# Patient Record
Sex: FEMALE | Race: WHITE | NOT HISPANIC OR LATINO | URBAN - METROPOLITAN AREA
[De-identification: names, ages, dates, MRNs, and addresses within clinical notes are randomized per-mention and may not be internally consistent; named-entity substitution may affect disease eponyms.]

---

## 2017-04-12 PROBLEM — Z00.00 ENCOUNTER FOR PREVENTIVE HEALTH EXAMINATION: Status: ACTIVE | Noted: 2017-04-12

## 2017-04-16 ENCOUNTER — EMERGENCY (EMERGENCY)
Facility: HOSPITAL | Age: 72
LOS: 0 days | Discharge: HOME | End: 2017-04-16
Attending: EMERGENCY MEDICINE | Admitting: EMERGENCY MEDICINE
Payer: MEDICARE

## 2017-04-16 VITALS
HEART RATE: 74 BPM | TEMPERATURE: 98 F | DIASTOLIC BLOOD PRESSURE: 70 MMHG | RESPIRATION RATE: 18 BRPM | OXYGEN SATURATION: 100 % | SYSTOLIC BLOOD PRESSURE: 128 MMHG

## 2017-04-16 VITALS — HEIGHT: 67 IN | WEIGHT: 143.08 LBS
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
## 2017-04-16 DIAGNOSIS — S69.91XA UNSPECIFIED INJURY OF RIGHT WRIST, HAND AND FINGER(S), INITIAL ENCOUNTER: ICD-10-CM
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
## 2017-04-16 DIAGNOSIS — M25.531 PAIN IN RIGHT WRIST: ICD-10-CM

## 2017-04-16 PROCEDURE — 29125 APPL SHORT ARM SPLINT STATIC: CPT | Mod: RT

## 2017-04-16 PROCEDURE — 99283 EMERGENCY DEPT VISIT LOW MDM: CPT | Mod: 25

## 2017-04-16 PROCEDURE — 73110 X-RAY EXAM OF WRIST: CPT | Mod: 26,RT

## 2017-04-16 RX ORDER — HYDROMORPHONE HYDROCHLORIDE 2 MG/ML
1 INJECTION INTRAMUSCULAR; INTRAVENOUS; SUBCUTANEOUS
Qty: 16 | Refills: 0 | OUTPATIENT
Start: 2017-04-16 | End: 2017-04-20

## 2017-04-16 RX ORDER — HYDROMORPHONE HYDROCHLORIDE 2 MG/ML
2 INJECTION INTRAMUSCULAR; INTRAVENOUS; SUBCUTANEOUS ONCE
Qty: 0 | Refills: 0 | Status: DISCONTINUED | OUTPATIENT
Start: 2017-04-16 | End: 2017-04-16

## 2017-04-16 RX ORDER — DIPHENHYDRAMINE HCL 50 MG
50 CAPSULE ORAL ONCE
Qty: 0 | Refills: 0 | Status: COMPLETED | OUTPATIENT
Start: 2017-04-16 | End: 2017-04-16

## 2017-04-16 RX ORDER — KETOROLAC TROMETHAMINE 30 MG/ML
15 SYRINGE (ML) INJECTION ONCE
Qty: 0 | Refills: 0 | Status: DISCONTINUED | OUTPATIENT
Start: 2017-04-16 | End: 2017-04-16

## 2017-04-16 RX ADMIN — HYDROMORPHONE HYDROCHLORIDE 2 MILLIGRAM(S): 2 INJECTION INTRAMUSCULAR; INTRAVENOUS; SUBCUTANEOUS at 11:34

## 2017-04-16 RX ADMIN — Medication 50 MILLIGRAM(S): at 11:34

## 2017-04-16 RX ADMIN — Medication 60 MILLIGRAM(S): at 09:32

## 2017-04-16 RX ADMIN — Medication 15 MILLIGRAM(S): at 10:20

## 2017-04-16 RX ADMIN — Medication 15 MILLIGRAM(S): at 09:32

## 2017-04-16 RX ADMIN — HYDROMORPHONE HYDROCHLORIDE 2 MILLIGRAM(S): 2 INJECTION INTRAMUSCULAR; INTRAVENOUS; SUBCUTANEOUS at 11:42

## 2017-04-16 NOTE — ED ADULT NURSE NOTE - OBJECTIVE STATEMENT
Pt reports hx of RA with pain to right wrist worsening over past few days. Pt initially denied acute injury, but upon further evaluation reports fall onto right hand.

## 2017-04-16 NOTE — ED PROVIDER NOTE - CONSTITUTIONAL, MLM
normal... Well appearing, thin, alert, oriented to person, place, time/situation and in mild  distress.

## 2017-04-16 NOTE — ED ADULT NURSE REASSESSMENT NOTE - NS ED NURSE REASSESS COMMENT FT1
Pt medicated as ordered for continued pain. Pt placed in right wrist/forearm splint and demonstrates understanding of use. Pt reports some relief of pain with splint. Pt ok to d/c as per MD and as per pt request, pt left prior to reassessment for pain relief.  Pt ambulatory with spouse for d/c.

## 2017-04-16 NOTE — ED PROVIDER NOTE - MEDICAL DECISION MAKING DETAILS
Salome Saeed   4/10/2017   Anticoagulation Therapy Visit    Description:  74 year old female   Provider:  Weiler, Karen, MD   Department:   Family Practice           INR as of 4/10/2017     Today's INR 2.7      Anticoagulation Summary as of 4/10/2017     INR goal 2.0-3.0   Today's INR 2.7   Full instructions 4/21: Hold; 4/22: Hold; 4/23: Hold; 4/24: Hold; 4/25: Hold; Otherwise 2.5 mg on Mon, Wed, Fri; 5 mg all other days   Next INR check 4/26/2017    Indications   Long-term (current) use of anticoagulants [Z79.01] [Z79.01]  CVA (cerebral vascular accident) (Resolved) [I63.9]         April 2017 Details    Sun Mon Tue Wed Thu Fri Sat           1                 2               3               4               5               6               7               8                 9               10      2.5 mg   See details      11      5 mg         12      2.5 mg         13      5 mg         14      2.5 mg         15      5 mg           16      5 mg         17      2.5 mg         18      5 mg         19      2.5 mg         20      5 mg         21      Hold         22      Hold           23      Hold   See details      24      Hold   See details      25      Hold   See details      26            27               28               29                 30                      Date Details   04/10 This INR check      04/23 Hold dose   Start Lovenox - take twice - every 12 hours      04/24 Hold dose   Continue Lovenox - take twice - every 12 hours      04/25 Hold dose   Last dose of Lovenox prior to surgery - only take morning dose       Date of next INR:  4/26/2017         How to take your warfarin dose     To take:  2.5 mg Take 0.5 of a 5 mg tablet.    To take:  5 mg Take 1 of the 5 mg tablets.    Hold Do not take your warfarin dose. See the Details table to the right for additional instructions.                 xr, pain meds, prednisone dose pack

## 2017-04-16 NOTE — ED ADULT NURSE REASSESSMENT NOTE - NS ED NURSE REASSESS COMMENT FT1
Pt reports minimal relief of pain with medication. Pt fingers warm and mobile with +radial pulses x2. Pt denies numbness or weakness, but limitation of movement noted due to pain.  MD to bedside.

## 2017-04-16 NOTE — ED PROVIDER NOTE - OBJECTIVE STATEMENT
71 year old female with rheumatoid arthritis on methotrexate, gabapentin, and prednisone 20mg a day, complains of right wrist pain with swelling for 1 day , pt is right handed and a , also with neck pain. "My rheumatoid is flaring." Dr. robledo usually gives her a cortisone shot but last 2 times it didn't work. Pt has had Bilateral knee replacements from the RA with . Pt. denies other medical hx including HTN, CAD, CVA, DM. 71 year old female with rheumatoid arthritis on methotrexate, gabapentin, and prednisone 20mg a day, complains of right wrist pain with swelling for 1 day , pt is right handed and a , also with neck pain. "My rheumatoid is flaring." Dr. robledo usually gives her a cortisone shot but last 2 times it didn't work. Pt notes a mild fall approx 1 week ago but swelling started yesterday. Pt has had Bilateral knee replacements from the RA with . Pt. denies other medical hx including HTN, CAD, CVA, DM.

## 2017-04-16 NOTE — ED PROVIDER NOTE - PHYSICAL EXAMINATION
right wrist with edema and dec ROM secondary to pain, no erythema, no warmth, Cervical spine with no midline TTP, no erythema, no warmth, no deformity, nl ROM with mild pain right wrist with edema and dec ROM secondary to pain, no erythema, no warmth, no snuff box TTP, Cervical spine with no midline TTP, no erythema, no warmth, no deformity, nl ROM with mild pain

## 2017-04-26 ENCOUNTER — EMERGENCY (EMERGENCY)
Facility: HOSPITAL | Age: 72
LOS: 0 days | Discharge: HOME | End: 2017-04-26
Attending: EMERGENCY MEDICINE | Admitting: EMERGENCY MEDICINE
Payer: MEDICARE

## 2017-04-26 VITALS
SYSTOLIC BLOOD PRESSURE: 134 MMHG | HEART RATE: 81 BPM | RESPIRATION RATE: 17 BRPM | OXYGEN SATURATION: 100 % | DIASTOLIC BLOOD PRESSURE: 74 MMHG

## 2017-04-26 VITALS — HEIGHT: 68 IN | WEIGHT: 143.08 LBS

## 2017-04-26 DIAGNOSIS — M25.551 PAIN IN RIGHT HIP: ICD-10-CM

## 2017-04-26 DIAGNOSIS — M85.88 OTHER SPECIFIED DISORDERS OF BONE DENSITY AND STRUCTURE, OTHER SITE: ICD-10-CM

## 2017-04-26 LAB
ALBUMIN SERPL ELPH-MCNC: 3 G/DL — LOW (ref 3.3–5)
ALP SERPL-CCNC: 100 U/L — SIGNIFICANT CHANGE UP (ref 40–120)
ALT FLD-CCNC: 15 U/L — SIGNIFICANT CHANGE UP (ref 12–78)
ANION GAP SERPL CALC-SCNC: 11 MMOL/L — SIGNIFICANT CHANGE UP (ref 5–17)
AST SERPL-CCNC: 17 U/L — SIGNIFICANT CHANGE UP (ref 15–37)
BASOPHILS # BLD AUTO: 0.1 K/UL — SIGNIFICANT CHANGE UP (ref 0–0.2)
BASOPHILS NFR BLD AUTO: 0.6 % — SIGNIFICANT CHANGE UP (ref 0–2)
BILIRUB SERPL-MCNC: 0.7 MG/DL — SIGNIFICANT CHANGE UP (ref 0.2–1.2)
BUN SERPL-MCNC: 20 MG/DL — SIGNIFICANT CHANGE UP (ref 7–23)
CALCIUM SERPL-MCNC: 9.1 MG/DL — SIGNIFICANT CHANGE UP (ref 8.5–10.1)
CHLORIDE SERPL-SCNC: 105 MMOL/L — SIGNIFICANT CHANGE UP (ref 96–108)
CO2 SERPL-SCNC: 24 MMOL/L — SIGNIFICANT CHANGE UP (ref 22–31)
CREAT SERPL-MCNC: 0.92 MG/DL — SIGNIFICANT CHANGE UP (ref 0.5–1.3)
EOSINOPHIL # BLD AUTO: 0 K/UL — SIGNIFICANT CHANGE UP (ref 0–0.5)
EOSINOPHIL NFR BLD AUTO: 0 % — SIGNIFICANT CHANGE UP (ref 0–6)
ERYTHROCYTE [SEDIMENTATION RATE] IN BLOOD: 37 MM/HR — HIGH (ref 0–20)
GLUCOSE SERPL-MCNC: 145 MG/DL — HIGH (ref 70–99)
HCT VFR BLD CALC: 34.7 % — SIGNIFICANT CHANGE UP (ref 34.5–45)
HGB BLD-MCNC: 11.8 G/DL — SIGNIFICANT CHANGE UP (ref 11.5–15.5)
LYMPHOCYTES # BLD AUTO: 0.7 K/UL — LOW (ref 1–3.3)
LYMPHOCYTES # BLD AUTO: 5.5 % — LOW (ref 13–44)
MCHC RBC-ENTMCNC: 28.1 PG — SIGNIFICANT CHANGE UP (ref 27–34)
MCHC RBC-ENTMCNC: 33.9 GM/DL — SIGNIFICANT CHANGE UP (ref 32–36)
MCV RBC AUTO: 83 FL — SIGNIFICANT CHANGE UP (ref 80–100)
MONOCYTES # BLD AUTO: 0.5 K/UL — SIGNIFICANT CHANGE UP (ref 0–0.9)
MONOCYTES NFR BLD AUTO: 4.3 % — SIGNIFICANT CHANGE UP (ref 2–14)
NEUTROPHILS # BLD AUTO: 11.2 K/UL — HIGH (ref 1.8–7.4)
NEUTROPHILS NFR BLD AUTO: 89.7 % — HIGH (ref 43–77)
PLATELET # BLD AUTO: 276 K/UL — SIGNIFICANT CHANGE UP (ref 150–400)
POTASSIUM SERPL-MCNC: 3.9 MMOL/L — SIGNIFICANT CHANGE UP (ref 3.5–5.3)
POTASSIUM SERPL-SCNC: 3.9 MMOL/L — SIGNIFICANT CHANGE UP (ref 3.5–5.3)
PROT SERPL-MCNC: 7.1 GM/DL — SIGNIFICANT CHANGE UP (ref 6–8.3)
RBC # BLD: 4.18 M/UL — SIGNIFICANT CHANGE UP (ref 3.8–5.2)
RBC # FLD: 13.8 % — SIGNIFICANT CHANGE UP (ref 10.3–14.5)
SODIUM SERPL-SCNC: 140 MMOL/L — SIGNIFICANT CHANGE UP (ref 135–145)
WBC # BLD: 12.5 K/UL — HIGH (ref 3.8–10.5)
WBC # FLD AUTO: 12.5 K/UL — HIGH (ref 3.8–10.5)

## 2017-04-26 PROCEDURE — 99284 EMERGENCY DEPT VISIT MOD MDM: CPT

## 2017-04-26 PROCEDURE — 76377 3D RENDER W/INTRP POSTPROCES: CPT | Mod: 26

## 2017-04-26 PROCEDURE — 72192 CT PELVIS W/O DYE: CPT | Mod: 26

## 2017-04-26 PROCEDURE — 73502 X-RAY EXAM HIP UNI 2-3 VIEWS: CPT | Mod: 26

## 2017-04-26 RX ORDER — KETOROLAC TROMETHAMINE 30 MG/ML
15 SYRINGE (ML) INJECTION ONCE
Qty: 0 | Refills: 0 | Status: DISCONTINUED | OUTPATIENT
Start: 2017-04-26 | End: 2017-04-26

## 2017-04-26 RX ORDER — TRAMADOL HYDROCHLORIDE 50 MG/1
1 TABLET ORAL
Qty: 10 | Refills: 0 | OUTPATIENT
Start: 2017-04-26

## 2017-04-26 RX ORDER — MORPHINE SULFATE 50 MG/1
4 CAPSULE, EXTENDED RELEASE ORAL ONCE
Qty: 0 | Refills: 0 | Status: DISCONTINUED | OUTPATIENT
Start: 2017-04-26 | End: 2017-04-26

## 2017-04-26 RX ORDER — HYDROMORPHONE HYDROCHLORIDE 2 MG/ML
1 INJECTION INTRAMUSCULAR; INTRAVENOUS; SUBCUTANEOUS ONCE
Qty: 0 | Refills: 0 | Status: DISCONTINUED | OUTPATIENT
Start: 2017-04-26 | End: 2017-04-26

## 2017-04-26 RX ORDER — KETOROLAC TROMETHAMINE 30 MG/ML
30 SYRINGE (ML) INJECTION ONCE
Qty: 0 | Refills: 0 | Status: DISCONTINUED | OUTPATIENT
Start: 2017-04-26 | End: 2017-04-26

## 2017-04-26 RX ORDER — TRAMADOL HYDROCHLORIDE 50 MG/1
50 TABLET ORAL ONCE
Qty: 0 | Refills: 0 | Status: COMPLETED | OUTPATIENT
Start: 2017-04-26 | End: 2017-04-26

## 2017-04-26 RX ADMIN — Medication 30 MILLIGRAM(S): at 13:40

## 2017-04-26 RX ADMIN — Medication 15 MILLIGRAM(S): at 13:02

## 2017-04-26 RX ADMIN — HYDROMORPHONE HYDROCHLORIDE 1 MILLIGRAM(S): 2 INJECTION INTRAMUSCULAR; INTRAVENOUS; SUBCUTANEOUS at 14:22

## 2017-04-26 RX ADMIN — Medication 125 MILLIGRAM(S): at 13:01

## 2017-04-26 RX ADMIN — Medication 30 MILLIGRAM(S): at 14:07

## 2017-04-26 RX ADMIN — MORPHINE SULFATE 4 MILLIGRAM(S): 50 CAPSULE, EXTENDED RELEASE ORAL at 13:02

## 2017-04-26 RX ADMIN — HYDROMORPHONE HYDROCHLORIDE 1 MILLIGRAM(S): 2 INJECTION INTRAMUSCULAR; INTRAVENOUS; SUBCUTANEOUS at 14:49

## 2017-04-26 NOTE — ED STATDOCS - NS ED MD SCRIBE ATTENDING SCRIBE SECTIONS
HISTORY OF PRESENT ILLNESS/PAST MEDICAL/SURGICAL/SOCIAL HISTORY/PROGRESS NOTE/DISPOSITION/PHYSICAL EXAM/REVIEW OF SYSTEMS/RESULTS

## 2017-04-26 NOTE — ED STATDOCS - MEDICAL DECISION MAKING DETAILS
70 y/o female presents to the ED with right hip apin, hx of RA. Plan blood work, xray and pain control 70 y/o female presents to the ED with right hip apin, hx of RA. Plan blood work, xray, steroids, and pain control ; poss admission if pt unable to ambulate

## 2017-04-26 NOTE — ED STATDOCS - MUSCULOSKELETAL, MLM
range of motion is not limited and there is no muscle tenderness. No midline spinal TTP. Pain in right hip with passive ROM, internal and external rotation.

## 2017-04-26 NOTE — ED STATDOCS - OBJECTIVE STATEMENT
70 y/o female with PMhx of RA presents to the ED c/o right hip pain that started 1 day ago. She states that it feels like her usual RA flare up and states that she has not taken the medications for her RA today. [facility] flare up in her right wrist and has been on steroids since she was discharged. Currently pt has no other complaints and denies trauma, chest pain, SOB, fever, abd pain and n/v/d. PMD Dr. Sexton. 72 y/o female with PMhx of RA presents to the ED c/o right hip pain that started 1 day ago. She states that it feels like her usual RA flare up and states that she has not taken the medications for her RA today. [facility] flare up in her right wrist and has been on steroids since she was discharged. Dropped her methotrexate and has not been able to take it. Currently pt has no other complaints and denies trauma, chest pain, SOB, fever, abd pain and n/v/d. PMD Dr. Sexton.

## 2017-04-26 NOTE — ED STATDOCS - DETAILS:
I, Autumn Hodges, performed the initial face to face bedside interview with this patient regarding history of present illness, review of symptoms and relevant past medical, social and family history.  I completed an independent physical examination.  I was the initial provider who evaluated this patient. I have signed out the follow up of any pending tests (i.e. labs, radiological studies) to the ACP.  I have communicated the patient’s plan of care and disposition with the ACP.  The history, relevant review of systems, past medical and surgical history, medical decision making, and physical examination was documented by the scribe in my presence and I attest to the accuracy of the documentation.

## 2017-04-27 LAB — CRP SERPL-MCNC: 23.48 MG/DL — HIGH (ref 0–0.4)

## 2017-06-13 NOTE — ED ADULT NURSE NOTE - NS PRO PASSIVE SMOKE EXP
HD#3    Patient seen and examined at bedside. Pt still c/o pain overnight. Seen and counselled by Dr. Barahona of GYN Oncology yesterday. NPO for OR today. Pt was hypertensive overnight and says this was secondary to agitation from her hospital-room neighbor keeping her awake.     Patient is ambulating, passing flatus, voiding spontaneously. Denies CP, SOB, N/V, fevers, and chills    Vital Signs Last 24 Hours    T(C): 36.7, Max: 36.9 (06-12 @ 21:06)  HR: 80 (74 - 94)  BP: 160/88 (121/74 - 172/96)  RR: 18 (17 - 18)  SpO2: 95% (94% - 95%)  Wt(kg): --    I&O's Summary  I & Os for 24h ending 12 Jun 2017 07:00  =============================================  IN: 2605 ml / OUT: 0 ml / NET: 2605 ml    I & Os for current day (as of 13 Jun 2017 06:54)  =============================================  IN: 1040 ml / OUT: 0 ml / NET: 1040 ml      Physical Exam:  General: NAD  CV: NR, RR, S1, S2, no M/R/G  Lungs: CTA-B  Abdomen: Soft, RLQ tenderness, non-distended, +BS  Ext: No pain or swelling    Labs:                        15.7   9.3   )-----------( 176      ( 11 Jun 2017 18:41 )             45.9   baso 0.2    eos 0.5    imm gran x      lymph 24.8   mono 6.2    poly 68.3     06-11    140  |  101  |  11  ----------------------------<  122<H>  4.2   |  26  |  0.73    Ca    9.6      11 Jun 2017 18:41    TPro  7.5  /  Alb  3.8  /  TBili  0.3  /  DBili  x   /  AST  21  /  ALT  19  /  AlkPhos  64  06-11    PT/INR - ( 11 Jun 2017 18:41 )   PT: 11.9 sec;   INR: 1.10 ratio         PTT - ( 11 Jun 2017 18:41 )  PTT:29.0 sec      Blood Type: O Positive      MEDICATIONS  (STANDING):  nicotine - 21 mG/24Hr(s) Patch 1patch Transdermal daily  levothyroxine 100MICROGram(s) Oral daily  heparin  Injectable 5000Unit(s) SubCutaneous every 12 hours  lactated ringers. 1000milliLiter(s) IV Continuous <Continuous>    MEDICATIONS  (PRN):  ketorolac   Injectable 30milliGRAM(s) IV Push every 6 hours PRN Mild Pain (1 - 3)  morphine  - Injectable 4milliGRAM(s) IV Push every 4 hours PRN Severe Pain (7 - 10) No

## 2017-06-23 ENCOUNTER — APPOINTMENT (OUTPATIENT)
Dept: RHEUMATOLOGY | Facility: CLINIC | Age: 72
End: 2017-06-23

## 2017-07-27 ENCOUNTER — OUTPATIENT (OUTPATIENT)
Dept: OUTPATIENT SERVICES | Facility: HOSPITAL | Age: 72
LOS: 1 days | Discharge: HOME | End: 2017-07-27
Payer: MEDICARE

## 2017-07-27 VITALS
TEMPERATURE: 98 F | WEIGHT: 143.3 LBS | OXYGEN SATURATION: 96 % | HEART RATE: 96 BPM | DIASTOLIC BLOOD PRESSURE: 89 MMHG | HEIGHT: 68 IN | SYSTOLIC BLOOD PRESSURE: 140 MMHG | RESPIRATION RATE: 16 BRPM
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
## 2017-07-27 DIAGNOSIS — Z98.890 OTHER SPECIFIED POSTPROCEDURAL STATES: Chronic | ICD-10-CM

## 2017-07-27 DIAGNOSIS — Z96.651 PRESENCE OF RIGHT ARTIFICIAL KNEE JOINT: Chronic | ICD-10-CM

## 2017-07-27 DIAGNOSIS — Z79.899 OTHER LONG TERM (CURRENT) DRUG THERAPY: ICD-10-CM

## 2017-07-27 DIAGNOSIS — I10 ESSENTIAL (PRIMARY) HYPERTENSION: ICD-10-CM

## 2017-07-27 DIAGNOSIS — D72.829 ELEVATED WHITE BLOOD CELL COUNT, UNSPECIFIED: ICD-10-CM

## 2017-07-27 DIAGNOSIS — M06.9 RHEUMATOID ARTHRITIS, UNSPECIFIED: ICD-10-CM

## 2017-07-27 DIAGNOSIS — T84.033S MECHANICAL LOOSENING OF INTERNAL LEFT KNEE PROSTHETIC JOINT, SEQUELA: ICD-10-CM

## 2017-07-27 DIAGNOSIS — T84.033A MECHANICAL LOOSENING OF INTERNAL LEFT KNEE PROSTHETIC JOINT, INITIAL ENCOUNTER: ICD-10-CM

## 2017-07-27 DIAGNOSIS — Z01.818 ENCOUNTER FOR OTHER PREPROCEDURAL EXAMINATION: ICD-10-CM

## 2017-07-27 DIAGNOSIS — D62 ACUTE POSTHEMORRHAGIC ANEMIA: ICD-10-CM

## 2017-07-27 DIAGNOSIS — T84.093A OTHER MECHANICAL COMPLICATION OF INTERNAL LEFT KNEE PROSTHESIS, INITIAL ENCOUNTER: Chronic | ICD-10-CM

## 2017-07-27 LAB
ANION GAP SERPL CALC-SCNC: 5 MMOL/L — SIGNIFICANT CHANGE UP (ref 5–17)
APPEARANCE UR: CLEAR — SIGNIFICANT CHANGE UP
BACTERIA # UR AUTO: (no result)
BASOPHILS # BLD AUTO: 0.2 K/UL — SIGNIFICANT CHANGE UP (ref 0–0.2)
BASOPHILS NFR BLD AUTO: 1.1 % — SIGNIFICANT CHANGE UP (ref 0–2)
BILIRUB UR-MCNC: NEGATIVE — SIGNIFICANT CHANGE UP
BLD GP AB SCN SERPL QL: SIGNIFICANT CHANGE UP
BUN SERPL-MCNC: 22 MG/DL — SIGNIFICANT CHANGE UP (ref 7–23)
CALCIUM SERPL-MCNC: 9.3 MG/DL — SIGNIFICANT CHANGE UP (ref 8.5–10.1)
CHLORIDE SERPL-SCNC: 103 MMOL/L — SIGNIFICANT CHANGE UP (ref 96–108)
CO2 SERPL-SCNC: 30 MMOL/L — SIGNIFICANT CHANGE UP (ref 22–31)
COLOR SPEC: YELLOW — SIGNIFICANT CHANGE UP
CREAT SERPL-MCNC: 0.93 MG/DL — SIGNIFICANT CHANGE UP (ref 0.5–1.3)
DIFF PNL FLD: NEGATIVE — SIGNIFICANT CHANGE UP
EOSINOPHIL # BLD AUTO: 0.2 K/UL — SIGNIFICANT CHANGE UP (ref 0–0.5)
EOSINOPHIL NFR BLD AUTO: 1.4 % — SIGNIFICANT CHANGE UP (ref 0–6)
EPI CELLS # UR: SIGNIFICANT CHANGE UP
GLUCOSE SERPL-MCNC: 138 MG/DL — HIGH (ref 70–99)
GLUCOSE UR QL: NEGATIVE MG/DL — SIGNIFICANT CHANGE UP
HCT VFR BLD CALC: 39.7 % — SIGNIFICANT CHANGE UP (ref 34.5–45)
HGB BLD-MCNC: 13.2 G/DL — SIGNIFICANT CHANGE UP (ref 11.5–15.5)
INR BLD: 0.98 RATIO — SIGNIFICANT CHANGE UP (ref 0.88–1.16)
KETONES UR-MCNC: NEGATIVE — SIGNIFICANT CHANGE UP
LEUKOCYTE ESTERASE UR-ACNC: (no result)
LYMPHOCYTES # BLD AUTO: 1.1 K/UL — SIGNIFICANT CHANGE UP (ref 1–3.3)
LYMPHOCYTES # BLD AUTO: 6.8 % — LOW (ref 13–44)
MCHC RBC-ENTMCNC: 27.8 PG — SIGNIFICANT CHANGE UP (ref 27–34)
MCHC RBC-ENTMCNC: 33.3 GM/DL — SIGNIFICANT CHANGE UP (ref 32–36)
MCV RBC AUTO: 83.6 FL — SIGNIFICANT CHANGE UP (ref 80–100)
MONOCYTES # BLD AUTO: 1.1 K/UL — HIGH (ref 0–0.9)
MONOCYTES NFR BLD AUTO: 6.9 % — SIGNIFICANT CHANGE UP (ref 2–14)
MRSA PCR RESULT.: DETECTED
NEUTROPHILS # BLD AUTO: 13.8 K/UL — HIGH (ref 1.8–7.4)
NEUTROPHILS NFR BLD AUTO: 83.8 % — HIGH (ref 43–77)
NITRITE UR-MCNC: NEGATIVE — SIGNIFICANT CHANGE UP
PH UR: 5 — SIGNIFICANT CHANGE UP (ref 5–8)
PLATELET # BLD AUTO: 329 K/UL — SIGNIFICANT CHANGE UP (ref 150–400)
POTASSIUM SERPL-MCNC: 4.3 MMOL/L — SIGNIFICANT CHANGE UP (ref 3.5–5.3)
POTASSIUM SERPL-SCNC: 4.3 MMOL/L — SIGNIFICANT CHANGE UP (ref 3.5–5.3)
PROT UR-MCNC: NEGATIVE MG/DL — SIGNIFICANT CHANGE UP
PROTHROM AB SERPL-ACNC: 10.6 SEC — SIGNIFICANT CHANGE UP (ref 9.8–12.7)
RBC # BLD: 4.75 M/UL — SIGNIFICANT CHANGE UP (ref 3.8–5.2)
RBC # FLD: 13.6 % — SIGNIFICANT CHANGE UP (ref 10.3–14.5)
RBC CASTS # UR COMP ASSIST: NEGATIVE /HPF — SIGNIFICANT CHANGE UP (ref 0–4)
S AUREUS DNA NOSE QL NAA+PROBE: DETECTED
SODIUM SERPL-SCNC: 138 MMOL/L — SIGNIFICANT CHANGE UP (ref 135–145)
SP GR SPEC: 1.01 — SIGNIFICANT CHANGE UP (ref 1.01–1.02)
TYPE + AB SCN PNL BLD: SIGNIFICANT CHANGE UP
UROBILINOGEN FLD QL: NEGATIVE MG/DL — SIGNIFICANT CHANGE UP
WBC # BLD: 16.4 K/UL — HIGH (ref 3.8–10.5)
WBC # FLD AUTO: 16.4 K/UL — HIGH (ref 3.8–10.5)
WBC UR QL: SIGNIFICANT CHANGE UP

## 2017-07-27 PROCEDURE — 93010 ELECTROCARDIOGRAM REPORT: CPT

## 2017-07-27 NOTE — H&P PST ADULT - PMH
Dizziness    HTN (hypertension)    RA (rheumatoid arthritis)    Unsteady gait  due to unstable Left knee

## 2017-07-27 NOTE — H&P PST ADULT - ASSESSMENT
This is a 72y/o female with a failed total left knee replacement who is now scheduled for a Left Total knee replacement    Patient instructed on     1. NPO post midnight of surgery  2. On the use of EZ sponges  3. Mupirocin use (told not to use unless she receives a phone call to start)  4. Aware that she needs medical clearance  5. May take Amlodipine with a sip of water This is a 70y/o female with a failed total Left knee replacement who is now scheduled for a new Left Total knee replacement    Patient instructed on     1. NPO post midnight of surgery  2. On the use of EZ sponges  3. Mupirocin use (told not to use unless she receives a phone call to start medication)  4. Aware that she needs medical clearance (has appointment with Dr. Sexton on 7/28/2017)  5. May take Amlodipine with a sip of water  6. Caprini score is 7

## 2017-07-27 NOTE — H&P PST ADULT - HISTORY OF PRESENT ILLNESS
This is a 70 y/o female who has a PMH of HTN who reports she had a Left knee replacement 6/28/2016. She reports since then she has fallen twice due to Left knee giving out" Both times she has fallen on her knees. She is snow scheduled for a Left knee revision. This is a 70 y/o female who has a PMH of HTN who reports she had a Left knee replacement 6/28/2016. She reports since then she has fallen twice due to Left knee giving out" Both times she has fallen on her knees. She is now scheduled for a Left knee revision.

## 2017-07-27 NOTE — H&P PST ADULT - PSH
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

## 2017-07-27 NOTE — H&P PST ADULT - FAMILY HISTORY
Father  Still living? No  Family history of lung cancer, Age at diagnosis: Age Unknown     Mother  Still living? No  Family history of heart disease, Age at diagnosis: Age Unknown

## 2017-07-28 NOTE — CHART NOTE - NSCHARTNOTEFT_GEN_A_CORE
spoke with patient on the phone to notify her of +MRSA nasal swab. Pt instructed to start mupirocin therapy BID on  7/31/17 & continue for 5 days. Instructed Patient to bring in ointment & mupirocin card on day of surgery, she verbalized understanding.

## 2017-08-03 RX ORDER — ACETAMINOPHEN 500 MG
650 TABLET ORAL ONCE
Qty: 0 | Refills: 0 | Status: COMPLETED | OUTPATIENT
Start: 2017-08-04 | End: 2017-08-04

## 2017-08-03 RX ORDER — OXYCODONE HYDROCHLORIDE 5 MG/1
20 TABLET ORAL ONCE
Qty: 0 | Refills: 0 | Status: DISCONTINUED | OUTPATIENT
Start: 2017-08-04 | End: 2017-08-04

## 2017-08-03 RX ORDER — CELECOXIB 200 MG/1
200 CAPSULE ORAL ONCE
Qty: 0 | Refills: 0 | Status: COMPLETED | OUTPATIENT
Start: 2017-08-04 | End: 2017-08-04

## 2017-08-03 RX ORDER — SODIUM CHLORIDE 9 MG/ML
3 INJECTION INTRAMUSCULAR; INTRAVENOUS; SUBCUTANEOUS EVERY 8 HOURS
Qty: 0 | Refills: 0 | Status: DISCONTINUED | OUTPATIENT
Start: 2017-08-04 | End: 2017-08-08

## 2017-08-04 ENCOUNTER — INPATIENT (INPATIENT)
Facility: HOSPITAL | Age: 72
LOS: 3 days | Discharge: HOME | End: 2017-08-08
Attending: ORTHOPAEDIC SURGERY | Admitting: ORTHOPAEDIC SURGERY
Payer: MEDICARE

## 2017-08-04 ENCOUNTER — TRANSCRIPTION ENCOUNTER (OUTPATIENT)
Age: 72
End: 2017-08-04

## 2017-08-04 ENCOUNTER — RESULT REVIEW (OUTPATIENT)
Age: 72
End: 2017-08-04

## 2017-08-04 VITALS
HEART RATE: 78 BPM | HEIGHT: 68 IN | DIASTOLIC BLOOD PRESSURE: 95 MMHG | TEMPERATURE: 98 F | SYSTOLIC BLOOD PRESSURE: 144 MMHG | OXYGEN SATURATION: 99 % | RESPIRATION RATE: 16 BRPM | WEIGHT: 143.3 LBS

## 2017-08-04 DIAGNOSIS — Z98.890 OTHER SPECIFIED POSTPROCEDURAL STATES: Chronic | ICD-10-CM

## 2017-08-04 DIAGNOSIS — Z96.651 PRESENCE OF RIGHT ARTIFICIAL KNEE JOINT: Chronic | ICD-10-CM

## 2017-08-04 DIAGNOSIS — T84.093A OTHER MECHANICAL COMPLICATION OF INTERNAL LEFT KNEE PROSTHESIS, INITIAL ENCOUNTER: Chronic | ICD-10-CM

## 2017-08-04 LAB
ANION GAP SERPL CALC-SCNC: 8 MMOL/L — SIGNIFICANT CHANGE UP (ref 5–17)
APTT BLD: 26.1 SEC — LOW (ref 27.5–37.4)
APTT BLD: 27.1 SEC — LOW (ref 27.5–37.4)
BUN SERPL-MCNC: 21 MG/DL — SIGNIFICANT CHANGE UP (ref 7–23)
CALCIUM SERPL-MCNC: 9 MG/DL — SIGNIFICANT CHANGE UP (ref 8.5–10.1)
CHLORIDE SERPL-SCNC: 109 MMOL/L — HIGH (ref 96–108)
CO2 SERPL-SCNC: 26 MMOL/L — SIGNIFICANT CHANGE UP (ref 22–31)
CREAT SERPL-MCNC: 0.95 MG/DL — SIGNIFICANT CHANGE UP (ref 0.5–1.3)
GLUCOSE SERPL-MCNC: 160 MG/DL — HIGH (ref 70–99)
HCT VFR BLD CALC: 34.1 % — LOW (ref 34.5–45)
HGB BLD-MCNC: 11.3 G/DL — LOW (ref 11.5–15.5)
INR BLD: 1.01 RATIO — SIGNIFICANT CHANGE UP (ref 0.88–1.16)
INR BLD: 1.05 RATIO — SIGNIFICANT CHANGE UP (ref 0.88–1.16)
MCHC RBC-ENTMCNC: 27.5 PG — SIGNIFICANT CHANGE UP (ref 27–34)
MCHC RBC-ENTMCNC: 33 GM/DL — SIGNIFICANT CHANGE UP (ref 32–36)
MCV RBC AUTO: 83.2 FL — SIGNIFICANT CHANGE UP (ref 80–100)
PLATELET # BLD AUTO: 307 K/UL — SIGNIFICANT CHANGE UP (ref 150–400)
POTASSIUM SERPL-MCNC: 4 MMOL/L — SIGNIFICANT CHANGE UP (ref 3.5–5.3)
POTASSIUM SERPL-SCNC: 4 MMOL/L — SIGNIFICANT CHANGE UP (ref 3.5–5.3)
PROTHROM AB SERPL-ACNC: 10.9 SEC — SIGNIFICANT CHANGE UP (ref 9.8–12.7)
PROTHROM AB SERPL-ACNC: 11.3 SEC — SIGNIFICANT CHANGE UP (ref 9.8–12.7)
RBC # BLD: 4.1 M/UL — SIGNIFICANT CHANGE UP (ref 3.8–5.2)
RBC # FLD: 13.8 % — SIGNIFICANT CHANGE UP (ref 10.3–14.5)
SODIUM SERPL-SCNC: 143 MMOL/L — SIGNIFICANT CHANGE UP (ref 135–145)
WBC # BLD: 10.4 K/UL — SIGNIFICANT CHANGE UP (ref 3.8–10.5)
WBC # FLD AUTO: 10.4 K/UL — SIGNIFICANT CHANGE UP (ref 3.8–10.5)

## 2017-08-04 PROCEDURE — 88304 TISSUE EXAM BY PATHOLOGIST: CPT | Mod: 26

## 2017-08-04 PROCEDURE — 73560 X-RAY EXAM OF KNEE 1 OR 2: CPT | Mod: 26,LT

## 2017-08-04 PROCEDURE — 99223 1ST HOSP IP/OBS HIGH 75: CPT

## 2017-08-04 PROCEDURE — 88300 SURGICAL PATH GROSS: CPT | Mod: 26

## 2017-08-04 RX ORDER — POLYETHYLENE GLYCOL 3350 17 G/17G
17 POWDER, FOR SOLUTION ORAL DAILY
Qty: 0 | Refills: 0 | Status: DISCONTINUED | OUTPATIENT
Start: 2017-08-04 | End: 2017-08-08

## 2017-08-04 RX ORDER — SODIUM CHLORIDE 9 MG/ML
1000 INJECTION, SOLUTION INTRAVENOUS
Qty: 0 | Refills: 0 | Status: DISCONTINUED | OUTPATIENT
Start: 2017-08-04 | End: 2017-08-04

## 2017-08-04 RX ORDER — DIPHENHYDRAMINE HCL 50 MG
50 CAPSULE ORAL EVERY 4 HOURS
Qty: 0 | Refills: 0 | Status: DISCONTINUED | OUTPATIENT
Start: 2017-08-05 | End: 2017-08-08

## 2017-08-04 RX ORDER — ASCORBIC ACID 60 MG
500 TABLET,CHEWABLE ORAL
Qty: 0 | Refills: 0 | Status: DISCONTINUED | OUTPATIENT
Start: 2017-08-04 | End: 2017-08-08

## 2017-08-04 RX ORDER — ONDANSETRON 8 MG/1
4 TABLET, FILM COATED ORAL EVERY 6 HOURS
Qty: 0 | Refills: 0 | Status: DISCONTINUED | OUTPATIENT
Start: 2017-08-04 | End: 2017-08-08

## 2017-08-04 RX ORDER — AMLODIPINE BESYLATE 2.5 MG/1
5 TABLET ORAL DAILY
Qty: 0 | Refills: 0 | Status: DISCONTINUED | OUTPATIENT
Start: 2017-08-05 | End: 2017-08-06

## 2017-08-04 RX ORDER — MAGNESIUM HYDROXIDE 400 MG/1
30 TABLET, CHEWABLE ORAL DAILY
Qty: 0 | Refills: 0 | Status: DISCONTINUED | OUTPATIENT
Start: 2017-08-04 | End: 2017-08-08

## 2017-08-04 RX ORDER — BENZOCAINE AND MENTHOL 5; 1 G/100ML; G/100ML
1 LIQUID ORAL
Qty: 0 | Refills: 0 | Status: DISCONTINUED | OUTPATIENT
Start: 2017-08-05 | End: 2017-08-08

## 2017-08-04 RX ORDER — FOLIC ACID 0.8 MG
1 TABLET ORAL DAILY
Qty: 0 | Refills: 0 | Status: DISCONTINUED | OUTPATIENT
Start: 2017-08-04 | End: 2017-08-08

## 2017-08-04 RX ORDER — OXYCODONE HYDROCHLORIDE 5 MG/1
10 TABLET ORAL EVERY 4 HOURS
Qty: 0 | Refills: 0 | Status: DISCONTINUED | OUTPATIENT
Start: 2017-08-04 | End: 2017-08-07

## 2017-08-04 RX ORDER — ACETAMINOPHEN 500 MG
650 TABLET ORAL EVERY 6 HOURS
Qty: 0 | Refills: 0 | Status: DISCONTINUED | OUTPATIENT
Start: 2017-08-04 | End: 2017-08-08

## 2017-08-04 RX ORDER — FERROUS SULFATE 325(65) MG
325 TABLET ORAL
Qty: 0 | Refills: 0 | Status: DISCONTINUED | OUTPATIENT
Start: 2017-08-04 | End: 2017-08-08

## 2017-08-04 RX ORDER — SODIUM CHLORIDE 9 MG/ML
1000 INJECTION, SOLUTION INTRAVENOUS
Qty: 0 | Refills: 0 | Status: DISCONTINUED | OUTPATIENT
Start: 2017-08-04 | End: 2017-08-06

## 2017-08-04 RX ORDER — FAMOTIDINE 10 MG/ML
20 INJECTION INTRAVENOUS EVERY 12 HOURS
Qty: 0 | Refills: 0 | Status: DISCONTINUED | OUTPATIENT
Start: 2017-08-04 | End: 2017-08-08

## 2017-08-04 RX ORDER — ONDANSETRON 8 MG/1
4 TABLET, FILM COATED ORAL ONCE
Qty: 0 | Refills: 0 | Status: DISCONTINUED | OUTPATIENT
Start: 2017-08-04 | End: 2017-08-04

## 2017-08-04 RX ORDER — HYDROMORPHONE HYDROCHLORIDE 2 MG/ML
1 INJECTION INTRAMUSCULAR; INTRAVENOUS; SUBCUTANEOUS
Qty: 0 | Refills: 0 | Status: DISCONTINUED | OUTPATIENT
Start: 2017-08-04 | End: 2017-08-07

## 2017-08-04 RX ORDER — CEFAZOLIN SODIUM 1 G
2000 VIAL (EA) INJECTION EVERY 6 HOURS
Qty: 0 | Refills: 0 | Status: COMPLETED | OUTPATIENT
Start: 2017-08-04 | End: 2017-08-05

## 2017-08-04 RX ORDER — SENNA PLUS 8.6 MG/1
2 TABLET ORAL AT BEDTIME
Qty: 0 | Refills: 0 | Status: DISCONTINUED | OUTPATIENT
Start: 2017-08-04 | End: 2017-08-08

## 2017-08-04 RX ORDER — DOCUSATE SODIUM 100 MG
100 CAPSULE ORAL THREE TIMES A DAY
Qty: 0 | Refills: 0 | Status: DISCONTINUED | OUTPATIENT
Start: 2017-08-04 | End: 2017-08-08

## 2017-08-04 RX ORDER — ASPIRIN/CALCIUM CARB/MAGNESIUM 324 MG
325 TABLET ORAL
Qty: 0 | Refills: 0 | Status: DISCONTINUED | OUTPATIENT
Start: 2017-08-04 | End: 2017-08-04

## 2017-08-04 RX ORDER — ASPIRIN/CALCIUM CARB/MAGNESIUM 324 MG
325 TABLET ORAL
Qty: 0 | Refills: 0 | Status: DISCONTINUED | OUTPATIENT
Start: 2017-08-05 | End: 2017-08-08

## 2017-08-04 RX ORDER — ZALEPLON 10 MG
5 CAPSULE ORAL AT BEDTIME
Qty: 0 | Refills: 0 | Status: DISCONTINUED | OUTPATIENT
Start: 2017-08-04 | End: 2017-08-08

## 2017-08-04 RX ORDER — FENTANYL CITRATE 50 UG/ML
50 INJECTION INTRAVENOUS
Qty: 0 | Refills: 0 | Status: DISCONTINUED | OUTPATIENT
Start: 2017-08-04 | End: 2017-08-04

## 2017-08-04 RX ORDER — OXYCODONE HYDROCHLORIDE 5 MG/1
5 TABLET ORAL EVERY 4 HOURS
Qty: 0 | Refills: 0 | Status: DISCONTINUED | OUTPATIENT
Start: 2017-08-04 | End: 2017-08-07

## 2017-08-04 RX ORDER — OXYCODONE HYDROCHLORIDE 5 MG/1
5 TABLET ORAL EVERY 4 HOURS
Qty: 0 | Refills: 0 | Status: DISCONTINUED | OUTPATIENT
Start: 2017-08-04 | End: 2017-08-04

## 2017-08-04 RX ADMIN — OXYCODONE HYDROCHLORIDE 20 MILLIGRAM(S): 5 TABLET ORAL at 11:29

## 2017-08-04 RX ADMIN — Medication 1 TABLET(S): at 21:48

## 2017-08-04 RX ADMIN — OXYCODONE HYDROCHLORIDE 10 MILLIGRAM(S): 5 TABLET ORAL at 19:33

## 2017-08-04 RX ADMIN — Medication 325 MILLIGRAM(S): at 21:48

## 2017-08-04 RX ADMIN — Medication 100 MILLIGRAM(S): at 19:07

## 2017-08-04 RX ADMIN — CELECOXIB 200 MILLIGRAM(S): 200 CAPSULE ORAL at 11:29

## 2017-08-04 RX ADMIN — OXYCODONE HYDROCHLORIDE 5 MILLIGRAM(S): 5 TABLET ORAL at 16:47

## 2017-08-04 RX ADMIN — Medication 650 MILLIGRAM(S): at 11:29

## 2017-08-04 RX ADMIN — Medication 100 MILLIGRAM(S): at 21:48

## 2017-08-04 RX ADMIN — OXYCODONE HYDROCHLORIDE 10 MILLIGRAM(S): 5 TABLET ORAL at 19:03

## 2017-08-04 RX ADMIN — FAMOTIDINE 20 MILLIGRAM(S): 10 INJECTION INTRAVENOUS at 19:03

## 2017-08-04 NOTE — PHYSICAL THERAPY INITIAL EVALUATION ADULT - GENERAL OBSERVATIONS, REHAB EVAL
Pt rec'd supine in bed with  at bedside, reports she is tired and refusing to mobilize OOB with PT despite repeated instruction of benefits of mobility.

## 2017-08-04 NOTE — PROGRESS NOTE ADULT - SUBJECTIVE AND OBJECTIVE BOX
Orthopedics Post-op Check    This is a 70 y/o female s/p Left Total Knee Arthroplasty Revision POD #0.  Pain is controlled. Pt feeling well. No nausea or vomiting..    Vital Signs Last 24 Hrs  T(C): 36.5 (08-04-17 @ 16:30), Max: 37.6 (08-04-17 @ 15:15)  T(F): 97.7 (08-04-17 @ 16:30), Max: 99.6 (08-04-17 @ 15:15)  HR: 87 (08-04-17 @ 16:30) (78 - 95)  BP: 131/- (08-04-17 @ 16:30) (118/70 - 144/95)  BP(mean): --  RR: 16 (08-04-17 @ 16:30) (12 - 144)  SpO2: 95% (08-04-17 @ 16:30) (95% - 99%)                        11.3   10.4  )-----------( 307      ( 04 Aug 2017 15:47 )             34.1     04 Aug 2017 15:47    143    |  109    |  21     ----------------------------<  160    4.0     |  26     |  0.95     Ca    9.0        04 Aug 2017 15:47      PT/INR - ( 04 Aug 2017 15:47 )   PT: 11.3 sec;   INR: 1.05 ratio         PTT - ( 04 Aug 2017 15:47 )  PTT:26.1 sec    Exam:  NAD AAOx3  Dressing clean and dry.  Calves are soft and nontender.  +EHL FHL TA GS.  SILT toes 1-5.  DP and PT pulses 2+.    A/P:  Stable POD 0 Left Total Knee Arthroplasty Revision  -Analgesia  -Ppx ABX  -DVT PE ppx  -OOB PT

## 2017-08-04 NOTE — CONSULT NOTE ADULT - ASSESSMENT
This is a 71 year old female s/p left total knee revision with low risk thrombosis and low risk for bleeding.    Discussed the risks vs benefits of full dose aspirin therapy with patient. Agrees with treatment and understands the necessity of therapy.    Plan:  ::Enteric coated ASA 325mg PO BID x 30 days  ::Pepcid 20 mg PO Q12hr  ::Daily CBC/BMP  ::Venodynes  ::Enc ambulation    Thank you for this consult, will sign off. Do not hesitate to reconsult if necessary.
Pt is a 70 y/o female with h/o HTN, rheumatoid arthritis on chronic prednisone who s/p failed Lt knee replacement in 2016.  She has been having increasing Lt knee pain therefore she was admitted for Lt failed total knee arthroplasty.  Post-op medicine consult called for comanagement.      * Lt knee pain-s/p Lt total knee arthroplasty, continue pain control, PT, DVT proph, monitor post-op labs.  * Acute Blood Loss Anemia-surgical loss, monitor, po iron.  * HTN-continue amlodipine  * Rheumatoid Arthritis-resume po prednisone which she is on chronically, s/p IV steroids in OR, monitor for adrenal insufficiency  * Proph- aspirin  * Disp-OOB, PT

## 2017-08-04 NOTE — CONSULT NOTE ADULT - SUBJECTIVE AND OBJECTIVE BOX
CC: loose hardware left knee  HPI:  Patient is a 71y old  female s/p left total knee revision s/p loose hardware after recent TKR.      Consulted by Dr. Rodriguez for VTE prophylaxis, risk stratification, and anticoagulation management.    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 Denies any questions.    FAMILY HISTORY:  Family history of heart disease (Mother)  Family history of lung cancer (Father)    Denies any personal or familial history of clotting or bleeding disorders.    Allergies    No Known Allergies    Intolerances        REVIEW OF SYSTEMS    (  )Fever	     (  )Constipation	(  )SOB				(  )Headache	(  )Dysuria  (  )Chills	     (  )Melena	(  )Dyspnea present on exertion	                    (  )Dizziness                    (  )Polyuria  (  )Nausea	     (  )Hematochezia	(  )Cough			                    (  )Syncope   	(  )Hematuria  (  )Vomiting    (  )Chest Pain	(  )Wheezing			(  )Weakness  (  )Diarrhea     (  )Palpitations	(  )Anorexia			(  )Myalgia    All other review of systems negative: Yes      PHYSICAL EXAM:    Constitutional: Appears Well    Neurological: A& O x 3    Skin: Warm    Respiratory and Thorax: normal effort; Breath sounds: normal; No rales/wheezing/rhonchi  	  Cardiovascular: S1, S2, regular, NMBR	    Gastrointestinal: BS + x 4Q, nontender	    Genitourinary:  Bladder nondistended, nontender    Musculoskeletal:   General Right:   no muscle/joint tenderness,   normal tone, no joint swelling,   ROM: full	    General Left:   + muscle/joint tenderness,   normal tone, no joint swelling,   ROM: limited    Knee:  Left: Incision: ; Dressing CDI; Drain: hemovac ; Type of drng.: serosang.; Amt. of drng: small      Lower extrems:   Right: no calf tenderness              negative solis's sign               + pedal pulses    Left:   no calf tenderness              negative solis's sign               + pedal pulses                          11.3   10.4  )-----------( 307      ( 04 Aug 2017 15:47 )             34.1       08-04    143  |  109<H>  |  21  ----------------------------<  160<H>  4.0   |  26  |  0.95    Ca    9.0      04 Aug 2017 15:47        PT/INR - ( 04 Aug 2017 15:47 )   PT: 11.3 sec;   INR: 1.05 ratio         PTT - ( 04 Aug 2017 15:47 )  PTT:26.1 sec				    MEDICATIONS  (STANDING):  sodium chloride 0.9% lock flush 3 milliLiter(s) IV Push every 8 hours  lactated ringers. 1000 milliLiter(s) IV Continuous <Continuous>  ceFAZolin   IVPB 2000 milliGRAM(s) IV Intermittent every 6 hours  calcium carbonate 1250 mG + Vitamin D (OsCal 500 + D) 1 Tablet(s) Oral three times a day  famotidine    Tablet 20 milliGRAM(s) Oral every 12 hours  polyethylene glycol 3350 17 Gram(s) Oral daily  docusate sodium 100 milliGRAM(s) Oral three times a day  ferrous    sulfate 325 milliGRAM(s) Oral three times a day with meals  folic acid 1 milliGRAM(s) Oral daily  multivitamin 1 Tablet(s) Oral daily  ascorbic acid 500 milliGRAM(s) Oral two times a day  lactated ringers. 1000 milliLiter(s) IV Continuous <Continuous>  lactated ringers. 1000 milliLiter(s) IV Continuous <Continuous>  aspirin enteric coated 325 milliGRAM(s) Oral two times a day      Vital Signs Last 24 Hrs  T(C): 36.5 (04 Aug 2017 16:30), Max: 37.6 (04 Aug 2017 15:15)  T(F): 97.7 (04 Aug 2017 16:30), Max: 99.6 (04 Aug 2017 15:15)  HR: 87 (04 Aug 2017 16:30) (78 - 95)  BP: 131/- (04 Aug 2017 16:30) (118/70 - 144/95)  BP(mean): --  RR: 16 (04 Aug 2017 16:30) (12 - 144)  SpO2: 95% (04 Aug 2017 16:30) (95% - 99%)    DVT Prophylaxis:  LMWH                   (  )  Heparin SQ           (  )  Coumadin             (  )  Xarelto                  (  )  Eliquis                   (  )  Venodynes           ( x )  Ambulation          (x  )  UFH                       (  )  Contraindicated  (  )
Patient is a 71y old  Female who presents with a chief complaint of revision left total knee replacement (04 Aug 2017 10:53)      HPI: Pt is a 70 y/o female with h/o HTN, rheumatoid arthritis on chronic prednisone who s/p failed Lt knee replacement in 2016.  She has been having increasing Lt knee pain therefore she was admitted for Lt failed total knee arthroplasty.  Post-op medicine consult called for comanagement.  Pt seen in PACU c/o Lt knee pain.  No CP or SOB.        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 1000 milliLiter(s) (75 mL/Hr) IV Continuous <Continuous>  ceFAZolin   IVPB 2000 milliGRAM(s) IV Intermittent every 6 hours  calcium carbonate 1250 mG + Vitamin D (OsCal 500 + D) 1 Tablet(s) Oral three times a day  famotidine    Tablet 20 milliGRAM(s) Oral every 12 hours  polyethylene glycol 3350 17 Gram(s) Oral daily  docusate sodium 100 milliGRAM(s) Oral three times a day  ferrous    sulfate 325 milliGRAM(s) Oral three times a day with meals  folic acid 1 milliGRAM(s) Oral daily  multivitamin 1 Tablet(s) Oral daily  ascorbic acid 500 milliGRAM(s) Oral two times a day  lactated ringers. 1000 milliLiter(s) (150 mL/Hr) IV Continuous <Continuous>  lactated ringers. 1000 milliLiter(s) (100 mL/Hr) IV Continuous <Continuous>  aspirin enteric coated 325 milliGRAM(s) Oral two times a day    MEDICATIONS  (PRN):  acetaminophen   Tablet 650 milliGRAM(s) Oral every 6 hours PRN For Temp greater than 38 C (100.4 F) or headache  oxyCODONE    IR 5 milliGRAM(s) Oral every 4 hours PRN Mild Pain  oxyCODONE    IR 10 milliGRAM(s) Oral every 4 hours PRN Moderate Pain  HYDROmorphone  Injectable 1 milliGRAM(s) SubCutaneous every 3 hours PRN Severe Pain  aluminum hydroxide/magnesium hydroxide/simethicone Suspension 30 milliLiter(s) Oral four times a day PRN Indigestion  ondansetron Injectable 4 milliGRAM(s) IV Push every 6 hours PRN Nausea and/or Vomiting  zaleplon 5 milliGRAM(s) Oral at bedtime PRN Insomnia  magnesium hydroxide Suspension 30 milliLiter(s) Oral daily PRN Constipation  senna 2 Tablet(s) Oral at bedtime PRN Constipation  fentaNYL    Injectable 50 MICROGram(s) IV Push every 5 minutes PRN Severe Pain  oxyCODONE    IR 5 milliGRAM(s) Oral every 4 hours PRN For moderate pain  ondansetron Injectable 4 milliGRAM(s) IV Push once PRN Nausea and/or Vomiting      FAMILY HISTORY:  Family history of heart disease (Mother)  Family history of lung cancer (Father)      Social History: , lives with , drinks 1-2 drink few times a week.  Former smoker, quit 1974, smoked 2 ppd for 14 years.      Vital Signs Last 24 Hrs  T(C): 36.5 (04 Aug 2017 16:30), Max: 37.6 (04 Aug 2017 15:15)  T(F): 97.7 (04 Aug 2017 16:30), Max: 99.6 (04 Aug 2017 15:15)  HR: 87 (04 Aug 2017 16:30) (78 - 95)  BP: 131/- (04 Aug 2017 16:30) (118/70 - 144/95)  BP(mean): --  RR: 16 (04 Aug 2017 16:30) (12 - 144)  SpO2: 95% (04 Aug 2017 16:30) (95% - 99%)    Daily Height in cm: 172.72 (04 Aug 2017 10:45)    Daily     I&O's Summary    04 Aug 2017 07:01  -  04 Aug 2017 16:52  --------------------------------------------------------  IN: 1000 mL / OUT: 0 mL / NET: 1000 mL          Data                          11.3   10.4  )-----------( 307      ( 04 Aug 2017 15:47 )             34.1       08-04    143  |  109<H>  |  21  ----------------------------<  160<H>  4.0   |  26  |  0.95    Ca    9.0      04 Aug 2017 15:47                      PT/INR - ( 04 Aug 2017 15:47 )   PT: 11.3 sec;   INR: 1.05 ratio         PTT - ( 04 Aug 2017 15:47 )  PTT:26.1 sec

## 2017-08-04 NOTE — PHYSICAL THERAPY INITIAL EVALUATION ADULT - PERTINENT HX OF CURRENT PROBLEM, REHAB EVAL
70 y/o female with h/o HTN, rheumatoid arthritis on chronic prednisone who s/p failed Lt knee replacement in 2016.  She has been having increasing Lt knee pain therefore she was admitted for Lt failed total knee arthroplasty.

## 2017-08-05 LAB
ANION GAP SERPL CALC-SCNC: 9 MMOL/L — SIGNIFICANT CHANGE UP (ref 5–17)
BUN SERPL-MCNC: 18 MG/DL — SIGNIFICANT CHANGE UP (ref 7–23)
CALCIUM SERPL-MCNC: 8.9 MG/DL — SIGNIFICANT CHANGE UP (ref 8.5–10.1)
CHLORIDE SERPL-SCNC: 105 MMOL/L — SIGNIFICANT CHANGE UP (ref 96–108)
CO2 SERPL-SCNC: 27 MMOL/L — SIGNIFICANT CHANGE UP (ref 22–31)
CREAT SERPL-MCNC: 0.87 MG/DL — SIGNIFICANT CHANGE UP (ref 0.5–1.3)
GLUCOSE SERPL-MCNC: 97 MG/DL — SIGNIFICANT CHANGE UP (ref 70–99)
HCT VFR BLD CALC: 31.6 % — LOW (ref 34.5–45)
HGB BLD-MCNC: 10.6 G/DL — LOW (ref 11.5–15.5)
MCHC RBC-ENTMCNC: 27.5 PG — SIGNIFICANT CHANGE UP (ref 27–34)
MCHC RBC-ENTMCNC: 33.4 GM/DL — SIGNIFICANT CHANGE UP (ref 32–36)
MCV RBC AUTO: 82.3 FL — SIGNIFICANT CHANGE UP (ref 80–100)
PLATELET # BLD AUTO: 251 K/UL — SIGNIFICANT CHANGE UP (ref 150–400)
POTASSIUM SERPL-MCNC: 3.9 MMOL/L — SIGNIFICANT CHANGE UP (ref 3.5–5.3)
POTASSIUM SERPL-SCNC: 3.9 MMOL/L — SIGNIFICANT CHANGE UP (ref 3.5–5.3)
RBC # BLD: 3.84 M/UL — SIGNIFICANT CHANGE UP (ref 3.8–5.2)
RBC # FLD: 13.8 % — SIGNIFICANT CHANGE UP (ref 10.3–14.5)
SODIUM SERPL-SCNC: 141 MMOL/L — SIGNIFICANT CHANGE UP (ref 135–145)
WBC # BLD: 16.2 K/UL — HIGH (ref 3.8–10.5)
WBC # FLD AUTO: 16.2 K/UL — HIGH (ref 3.8–10.5)

## 2017-08-05 RX ORDER — OXYCODONE HYDROCHLORIDE 5 MG/1
5 TABLET ORAL ONCE
Qty: 0 | Refills: 0 | Status: DISCONTINUED | OUTPATIENT
Start: 2017-08-05 | End: 2017-08-05

## 2017-08-05 RX ADMIN — AMLODIPINE BESYLATE 5 MILLIGRAM(S): 2.5 TABLET ORAL at 06:19

## 2017-08-05 RX ADMIN — HYDROMORPHONE HYDROCHLORIDE 1 MILLIGRAM(S): 2 INJECTION INTRAMUSCULAR; INTRAVENOUS; SUBCUTANEOUS at 10:20

## 2017-08-05 RX ADMIN — Medication 100 MILLIGRAM(S): at 13:38

## 2017-08-05 RX ADMIN — OXYCODONE HYDROCHLORIDE 10 MILLIGRAM(S): 5 TABLET ORAL at 06:19

## 2017-08-05 RX ADMIN — Medication 500 MILLIGRAM(S): at 06:19

## 2017-08-05 RX ADMIN — Medication 1 TABLET(S): at 06:19

## 2017-08-05 RX ADMIN — SODIUM CHLORIDE 3 MILLILITER(S): 9 INJECTION INTRAMUSCULAR; INTRAVENOUS; SUBCUTANEOUS at 21:15

## 2017-08-05 RX ADMIN — Medication 1 TABLET(S): at 11:42

## 2017-08-05 RX ADMIN — Medication 325 MILLIGRAM(S): at 17:02

## 2017-08-05 RX ADMIN — Medication 10 MILLIGRAM(S): at 06:19

## 2017-08-05 RX ADMIN — Medication 325 MILLIGRAM(S): at 17:01

## 2017-08-05 RX ADMIN — HYDROMORPHONE HYDROCHLORIDE 1 MILLIGRAM(S): 2 INJECTION INTRAMUSCULAR; INTRAVENOUS; SUBCUTANEOUS at 13:48

## 2017-08-05 RX ADMIN — HYDROMORPHONE HYDROCHLORIDE 1 MILLIGRAM(S): 2 INJECTION INTRAMUSCULAR; INTRAVENOUS; SUBCUTANEOUS at 09:21

## 2017-08-05 RX ADMIN — POLYETHYLENE GLYCOL 3350 17 GRAM(S): 17 POWDER, FOR SOLUTION ORAL at 11:40

## 2017-08-05 RX ADMIN — FAMOTIDINE 20 MILLIGRAM(S): 10 INJECTION INTRAVENOUS at 06:19

## 2017-08-05 RX ADMIN — OXYCODONE HYDROCHLORIDE 10 MILLIGRAM(S): 5 TABLET ORAL at 12:50

## 2017-08-05 RX ADMIN — FAMOTIDINE 20 MILLIGRAM(S): 10 INJECTION INTRAVENOUS at 17:04

## 2017-08-05 RX ADMIN — Medication 325 MILLIGRAM(S): at 08:06

## 2017-08-05 RX ADMIN — Medication 1 TABLET(S): at 13:37

## 2017-08-05 RX ADMIN — Medication 500 MILLIGRAM(S): at 17:01

## 2017-08-05 RX ADMIN — Medication 100 MILLIGRAM(S): at 06:19

## 2017-08-05 RX ADMIN — Medication 100 MILLIGRAM(S): at 21:18

## 2017-08-05 RX ADMIN — Medication 1 MILLIGRAM(S): at 11:46

## 2017-08-05 RX ADMIN — SODIUM CHLORIDE 3 MILLILITER(S): 9 INJECTION INTRAMUSCULAR; INTRAVENOUS; SUBCUTANEOUS at 13:40

## 2017-08-05 RX ADMIN — SODIUM CHLORIDE 3 MILLILITER(S): 9 INJECTION INTRAMUSCULAR; INTRAVENOUS; SUBCUTANEOUS at 06:09

## 2017-08-05 RX ADMIN — OXYCODONE HYDROCHLORIDE 10 MILLIGRAM(S): 5 TABLET ORAL at 11:53

## 2017-08-05 RX ADMIN — Medication 325 MILLIGRAM(S): at 11:42

## 2017-08-05 RX ADMIN — Medication 325 MILLIGRAM(S): at 06:19

## 2017-08-05 RX ADMIN — OXYCODONE HYDROCHLORIDE 10 MILLIGRAM(S): 5 TABLET ORAL at 22:00

## 2017-08-05 RX ADMIN — HYDROMORPHONE HYDROCHLORIDE 1 MILLIGRAM(S): 2 INJECTION INTRAMUSCULAR; INTRAVENOUS; SUBCUTANEOUS at 14:45

## 2017-08-05 RX ADMIN — OXYCODONE HYDROCHLORIDE 10 MILLIGRAM(S): 5 TABLET ORAL at 21:17

## 2017-08-05 RX ADMIN — Medication 100 MILLIGRAM(S): at 02:29

## 2017-08-05 NOTE — PROGRESS NOTE ADULT - SUBJECTIVE AND OBJECTIVE BOX
Pt c/o Lt knee pain, uneventful night.  No CP or SOB.    Vital Signs Last 24 Hrs  T(C): 36.8 (05 Aug 2017 07:21), Max: 37.6 (04 Aug 2017 15:15)  T(F): 98.2 (05 Aug 2017 07:21), Max: 99.6 (04 Aug 2017 15:15)  HR: 75 (05 Aug 2017 06:08) (69 - 95)  BP: 155/84 (05 Aug 2017 06:08) (118/70 - 161/78)  BP(mean): --  RR: 16 (05 Aug 2017 03:28) (12 - 144)  SpO2: 100% (05 Aug 2017 03:28) (95% - 100%)    Daily Height in cm: 172.72 (04 Aug 2017 17:10)    Daily     I&O's Detail    04 Aug 2017 07:01  -  05 Aug 2017 07:00  --------------------------------------------------------  IN:    lactated ringers.: 850 mL    lactated ringers.: 200 mL    Other: 1000 mL  Total IN: 2050 mL    OUT:    Drain: 50 mL    Intermittent Catheterization - Urethral: 475 mL    Voided: 700 mL  Total OUT: 1225 mL    Total NET: 825 mL          CAPILLARY BLOOD GLUCOSE                                          10.6   16.2  )-----------( 251      ( 05 Aug 2017 05:35 )             31.6       08-05    141  |  105  |  18  ----------------------------<  97  3.9   |  27  |  0.87    Ca    8.9      05 Aug 2017 05:35        PT/INR - ( 04 Aug 2017 15:47 )   PT: 11.3 sec;   INR: 1.05 ratio         PTT - ( 04 Aug 2017 15:47 )  PTT:26.1 sec                MEDICATIONS  (STANDING):  sodium chloride 0.9% lock flush 3 milliLiter(s) IV Push every 8 hours  amLODIPine   Tablet 5 milliGRAM(s) Oral daily  lactated ringers. 1000 milliLiter(s) (75 mL/Hr) IV Continuous <Continuous>  calcium carbonate 1250 mG + Vitamin D (OsCal 500 + D) 1 Tablet(s) Oral three times a day  famotidine    Tablet 20 milliGRAM(s) Oral every 12 hours  polyethylene glycol 3350 17 Gram(s) Oral daily  docusate sodium 100 milliGRAM(s) Oral three times a day  ferrous    sulfate 325 milliGRAM(s) Oral three times a day with meals  folic acid 1 milliGRAM(s) Oral daily  multivitamin 1 Tablet(s) Oral daily  ascorbic acid 500 milliGRAM(s) Oral two times a day  predniSONE   Tablet 10 milliGRAM(s) Oral daily  aspirin enteric coated 325 milliGRAM(s) Oral two times a day    MEDICATIONS  (PRN):  acetaminophen   Tablet 650 milliGRAM(s) Oral every 6 hours PRN For Temp greater than 38 C (100.4 F) or headache  oxyCODONE    IR 5 milliGRAM(s) Oral every 4 hours PRN Mild Pain  oxyCODONE    IR 10 milliGRAM(s) Oral every 4 hours PRN Moderate Pain  HYDROmorphone  Injectable 1 milliGRAM(s) SubCutaneous every 3 hours PRN Severe Pain  aluminum hydroxide/magnesium hydroxide/simethicone Suspension 30 milliLiter(s) Oral four times a day PRN Indigestion  ondansetron Injectable 4 milliGRAM(s) IV Push every 6 hours PRN Nausea and/or Vomiting  zaleplon 5 milliGRAM(s) Oral at bedtime PRN Insomnia  magnesium hydroxide Suspension 30 milliLiter(s) Oral daily PRN Constipation  senna 2 Tablet(s) Oral at bedtime PRN Constipation  diphenhydrAMINE   Capsule 50 milliGRAM(s) Oral every 4 hours PRN Rash and/or Itching  benzocaine 15 mG/menthol 3.6 mG Lozenge 1 Lozenge Oral every 2 hours PRN Sore Throat

## 2017-08-05 NOTE — DISCHARGE NOTE ADULT - CARE PROVIDER_API CALL
Melchor Rodriguez (MD), Orthopaedic Surgery  [address] [place] [address]  Phone: [phone]  Fax: [phone]

## 2017-08-05 NOTE — DISCHARGE NOTE ADULT - MEDICATION SUMMARY - MEDICATIONS TO TAKE
I will START or STAY ON the medications listed below when I get home from the hospital:    predniSONE 10 mg oral tablet  -- 1 tab(s) by mouth once a day  -- Indication: For home med    Ecotrin 325 mg oral delayed release tablet  -- 1 tab(s) by mouth 2 times a day for blood clot prevention  -- Swallow whole.  Do not crush.  Take with food or milk.    -- Indication: For dvt ppx    oxyCODONE-acetaminophen 5 mg-325 mg oral tablet  -- 1 tab(s) by mouth every 4 hours, As Needed -for severe pain MDD:6 tabs per day  -- Caution federal law prohibits the transfer of this drug to any person other  than the person for whom it was prescribed.  May cause drowsiness.  Alcohol may intensify this effect.  Use care when operating dangerous machinery.  This prescription cannot be refilled.  This product contains acetaminophen.  Do not use  with any other product containing acetaminophen to prevent possible liver damage.  Using more of this medication than prescribed may cause serious breathing problems.    -- Indication: For pain    amLODIPine  -- 5 milligram(s) by mouth once a day  -- Indication: For home med    famotidine 20 mg oral tablet  -- 1 tab(s) by mouth 2 times a day for gastrointestinal prophylaxis  -- It is very important that you take or use this exactly as directed.  Do not skip doses or discontinue unless directed by your doctor.  Obtain medical advice before taking any non-prescription drugs as some may affect the action of this medication.    -- Indication: For gi ppx    ginger oral capsule  -- 1  by mouth once a day  -- Indication: For home med    Flax Seed Oil oral capsule  -- 1000 milligram(s) by mouth once a day  -- Indication: For home med    Fish Oil 1200 mg oral capsule  -- 1 cap(s) by mouth once a day  -- Indication: For home med    Vitamin C 1000 mg oral tablet  -- 1 tab(s) by mouth once a day  -- Indication: For home med    Vitamin D3 2000 intl units oral capsule  -- 1 cap(s) by mouth once a day  -- Indication: For home med    vitamin E 400 intl units oral capsule  -- 1 cap(s) by mouth once a day  -- Indication: For home med I will START or STAY ON the medications listed below when I get home from the hospital:    predniSONE 10 mg oral tablet  -- 1 tab(s) by mouth once a day  -- Indication: For home med    Ecotrin 325 mg oral delayed release tablet  -- 1 tab(s) by mouth 2 times a day for blood clot prevention  -- Swallow whole.  Do not crush.  Take with food or milk.    -- Indication: For dvt ppx    oxyCODONE-acetaminophen 5 mg-325 mg oral tablet  -- 1 tab(s) by mouth every 4 hours, As Needed -for severe pain MDD:6 tabs per day  -- Caution federal law prohibits the transfer of this drug to any person other  than the person for whom it was prescribed.  May cause drowsiness.  Alcohol may intensify this effect.  Use care when operating dangerous machinery.  This prescription cannot be refilled.  This product contains acetaminophen.  Do not use  with any other product containing acetaminophen to prevent possible liver damage.  Using more of this medication than prescribed may cause serious breathing problems.    -- Indication: For pain    amLODIPine  -- 5 milligram(s) by mouth once a day  -- Indication: For home med    famotidine 20 mg oral tablet  -- 1 tab(s) by mouth 2 times a day for gastrointestinal prophylaxis  -- It is very important that you take or use this exactly as directed.  Do not skip doses or discontinue unless directed by your doctor.  Obtain medical advice before taking any non-prescription drugs as some may affect the action of this medication.    -- Indication: For gi ppx    ginger oral capsule  -- 1  by mouth once a day  -- Indication: For home med    Flax Seed Oil oral capsule  -- 1000 milligram(s) by mouth once a day  -- Indication: For home med    Colace 100 mg oral capsule  -- 1 cap(s) by mouth 2 times a day -for constipation  -- Medication should be taken with plenty of water.    -- Indication: For stool softener    Fish Oil 1200 mg oral capsule  -- 1 cap(s) by mouth once a day  -- Indication: For home med    Vitamin C 1000 mg oral tablet  -- 1 tab(s) by mouth once a day  -- Indication: For home med    Vitamin D3 2000 intl units oral capsule  -- 1 cap(s) by mouth once a day  -- Indication: For home med    vitamin E 400 intl units oral capsule  -- 1 cap(s) by mouth once a day  -- Indication: For home med

## 2017-08-05 NOTE — DISCHARGE NOTE ADULT - HOSPITAL COURSE
The patient is a 71F status post elective left total knee revusuib arthroplasty after failing outpatient nonoperative conservative management.  Patient presented to [facility] after being medically cleared for an elective surgical procedure. The patient was taken to the operating room on date mentioned above. Prophylactic antibiotics were started before the procedure and continued for 24 hours.  There were no complications during the procedure and patient tolerated the procedure well.  The patient was transferred to recovery room in stable condition and subsequently to surgical floor.  Patient was placed on aspirin  for anticoagulation.  All home medications were continued.  The patient received physical therapy daily and daily labs were followed.  The dressing was kept clean, dry, and intact.  The rest of the hospital stay was unremarkable. The patient is a 71F status post elective left total knee revision arthroplasty after failing outpatient nonoperative conservative management.  Patient presented to [facility] after being medically cleared for an elective surgical procedure. The patient was taken to the operating room on date mentioned above. Prophylactic antibiotics were started before the procedure and continued for 24 hours.  There were no complications during the procedure and patient tolerated the procedure well.  The patient was transferred to recovery room in stable condition and subsequently to surgical floor.  Patient was placed on aspirin  for anticoagulation.  All home medications were continued.  The patient received physical therapy daily and daily labs were followed.  The dressing was kept clean, dry, and intact.  The rest of the hospital stay was unremarkable. 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 Pt is afebrile with stable vital signs. Pain is controlled. Alert and Oriented. Exam reveals intact EHL FHL TA GS, +DP. Dressing is clean and dry with a New Aquacel bandage on.    Vital Signs Last 24 Hrs  T(C): 36.9 (08-07-17 @ 05:08), Max: 36.9 (08-07-17 @ 05:08)  T(F): 98.4 (08-07-17 @ 05:08), Max: 98.4 (08-07-17 @ 05:08)  HR: 96 (08-07-17 @ 05:08) (90 - 96)  BP: 111/77 (08-07-17 @ 05:08) (111/77 - 116/74)  BP(mean): --  RR: 18 (08-07-17 @ 05:08) (18 - 18)  SpO2: 94% (08-07-17 @ 05:08) (94% - 100%)                        10.9   12.1  )-----------( 250      ( 07 Aug 2017 05:33 )             31.9         Hospital Course:  Patient presented to [facility] after being medically cleared for an elective surgical procedure, having failed outpatient non-operative conservative management. Prophylactic antibiotics were started before the procedure and continued for 24 hours. They were admitted after surgery to the orthopedic floor.   There were no complications during the hospital stay. All home medications were continued.     Routine consults were obtained from the Anticoagulation Team for DVT/PE prophylaxis, from Physical Therapy for twice daily PT starting on POD 0, and from the Hospitalist for Medical Co-management. Patient was placed on ECASA 325 BID for anticoagulation.  Pertinent home medications were continued.  Daily labs were followed.      On POD 0 the pt was OOB with PT and there were no overnight events. POD1 the hemovac drain  was removed. POD 2, PT was continued, and on POD 2 or POD 3 a new Aquacel dressing was applied. The pt is ready today for DC to home with home PT.  The orthopedic Attending is aware and agrees. 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 Pt is afebrile with stable vital signs. Pain is controlled. Alert and Oriented. Exam reveals intact EHL FHL TA GS, +DP. Dressing is clean and dry with a New Aquacel bandage on.    Vital Signs Last 24 Hrs  T(C): 36.6 (08 Aug 2017 04:21), Max: 36.7 (07 Aug 2017 23:29)  T(F): 97.9 (08 Aug 2017 04:21), Max: 98 (07 Aug 2017 23:29)  HR: 86 (08 Aug 2017 04:21) (86 - 95)  BP: 112/71 (08 Aug 2017 04:21) (112/71 - 128/79)  BP(mean): --  RR: 16 (08 Aug 2017 04:21) (16 - 17)  SpO2: 97% (08 Aug 2017 04:21) (94% - 97%)                        10.8   9.4   )-----------( 260      ( 08 Aug 2017 05:48 )             31.6       Hospital Course:  Patient presented to [facility] after being medically cleared for an elective surgical procedure, having failed outpatient non-operative conservative management. Prophylactic antibiotics were started before the procedure and continued for 24 hours. They were admitted after surgery to the orthopedic floor.   There were no complications during the hospital stay. All home medications were continued.     Routine consults were obtained from the Anticoagulation Team for DVT/PE prophylaxis, from Physical Therapy for twice daily PT starting on POD 0, and from the Hospitalist for Medical Co-management. Patient was placed on ECASA 325 BID for anticoagulation.  Pertinent home medications were continued.  Daily labs were followed.      On POD 0 the pt was OOB with PT and there were no overnight events. POD1 the hemovac drain  was removed. POD 2, PT was continued, and on POD 2 or POD 3 a new Aquacel dressing was applied. She stayed an extra day for PT and mild sedation/tiredness from pain meds, which resolved by reducing dosage. The pt is ready today POD4 for DC to home with home PT.  The orthopedic Attending is aware and agrees.

## 2017-08-05 NOTE — PROGRESS NOTE ADULT - ASSESSMENT
Pt is a 70 y/o female with h/o HTN, rheumatoid arthritis on chronic prednisone who s/p failed Lt knee replacement in 2016.  She has been having increasing Lt knee pain therefore she was admitted for Lt failed total knee arthroplasty.  Post-op medicine consult called for comanagement.      * Lt knee pain-s/p Lt total knee arthroplasty, continue pain control, PT, DVT proph, monitor post-op labs.  * Acute Blood Loss Anemia-surgical loss, monitor, po iron.  * HTN-bp labile, monitor if remains elevated than consider increasing amlodipine  * Rheumatoid Arthritis-continue po prednisone which she is on chronically, s/p IV steroids in OR, monitor for adrenal insufficiency  * Proph- aspirin  * Disp-OOB, PT   * Comm- d/w pt, RN Pt is a 70 y/o female with h/o HTN, rheumatoid arthritis on chronic prednisone who s/p failed Lt knee replacement in 2016.  She has been having increasing Lt knee pain therefore she was admitted for Lt failed total knee arthroplasty.  Post-op medicine consult called for comanagement.      * Lt knee pain-s/p Lt total knee arthroplasty, continue pain control, PT, DVT proph, monitor post-op labs.  * Acute Blood Loss Anemia-surgical loss, monitor, po iron.  * HTN-bp labile, monitor if remains elevated than consider increasing amlodipine  * Rheumatoid Arthritis-continue po prednisone which she is on chronically, s/p IV steroids in OR, monitor for adrenal insufficiency  * Leukocytosis-reactive from surgery, anemia and steroids.  Monitor for now since she is non-toxic and asymptomatic.  * Proph- aspirin  * Disp-OOB, PT   * Comm- d/w pt, RN

## 2017-08-05 NOTE — DISCHARGE NOTE ADULT - CARE PLAN
Principal Discharge DX:	Failed total left knee replacement  Goal:	Return to baseline ADLs  Instructions for follow-up, activity and diet:	Discharge Instructions for Total Knee Arthroplasty    1.  Diet: Resume previous diet    2. Activity: WBAT, Rolling walker, Daily PT. Gentle ROM 0-full as tolerated.  Wear immobilizer only while asleep x 3 weeks. Walk plenty.    3. Call with: fever over 101, wound redness, drainage or open area, calf pain/calf swelling    4. Wound Care: Remove old and place new Aquacel  bandage to Knee every 7 days. Remove Sutures/Staples Post Op Day #14 so long as wound is healed, no drainage or open area. OK to Shower with Aquacel.  Avoid direct water beating on bandage.  Continue ICE packs to knee.    5. DVT PE Prophylaxis:  -Continue Pepcid while on Anticoagulant    6. Labs: Check H&H weekly while on Anticoagulation    7. Follow Up: Orthopedics, 10-14 days in office. Call to schedule. If going home, eRX sent to your pharmacy for . Principal Discharge DX:	Failed total left knee replacement  Goal:	Return to baseline ADLs  Instructions for follow-up, activity and diet:	Discharge Instructions for Total Knee Arthroplasty    1.  Diet: Resume previous diet    2. Activity: WBAT, Rolling walker, Daily PT. Gentle ROM 0-full as tolerated.  Wear immobilizer only while asleep x 3 weeks. Walk plenty.    3. Call with: fever over 101, wound redness, drainage or open area, calf pain/calf swelling    4. Wound Care: Remove old and place new Aquacel  bandage to Knee every 7 days. Remove Sutures/Staples Post Op Day #14 (8/18) so long as wound is healed, no drainage or open area. OK to Shower with Aquacel.  Avoid direct water beating on bandage.  Continue ICE packs to knee.    5. DVT PE Prophylaxis: EC Aspirin twice daily 325mg  -Continue Pepcid while on Anticoagulant    6. Labs: Check H&H weekly while on Anticoagulation    7. Follow Up: Orthopedics, 10-14 days in office. Call to schedule. If going home, eRX sent to your pharmacy for .

## 2017-08-05 NOTE — DISCHARGE NOTE ADULT - MEDICATION SUMMARY - MEDICATIONS TO STOP TAKING
I will STOP taking the medications listed below when I get home from the hospital:    Aleve 220 mg oral capsule  -- 2 cap(s) by mouth once a day

## 2017-08-05 NOTE — DISCHARGE NOTE ADULT - PLAN OF CARE
Return to baseline ADLs Discharge Instructions for Total Knee Arthroplasty    1.  Diet: Resume previous diet    2. Activity: WBAT, Rolling walker, Daily PT. Gentle ROM 0-full as tolerated.  Wear immobilizer only while asleep x 3 weeks. Walk plenty.    3. Call with: fever over 101, wound redness, drainage or open area, calf pain/calf swelling    4. Wound Care: Remove old and place new Aquacel  bandage to Knee every 7 days. Remove Sutures/Staples Post Op Day #14 so long as wound is healed, no drainage or open area. OK to Shower with Aquacel.  Avoid direct water beating on bandage.  Continue ICE packs to knee.    5. DVT PE Prophylaxis:  -Continue Pepcid while on Anticoagulant    6. Labs: Check H&H weekly while on Anticoagulation    7. Follow Up: Orthopedics, 10-14 days in office. Call to schedule. If going home, eRX sent to your pharmacy for . Discharge Instructions for Total Knee Arthroplasty    1.  Diet: Resume previous diet    2. Activity: WBAT, Rolling walker, Daily PT. Gentle ROM 0-full as tolerated.  Wear immobilizer only while asleep x 3 weeks. Walk plenty.    3. Call with: fever over 101, wound redness, drainage or open area, calf pain/calf swelling    4. Wound Care: Remove old and place new Aquacel  bandage to Knee every 7 days. Remove Sutures/Staples Post Op Day #14 (8/18) so long as wound is healed, no drainage or open area. OK to Shower with Aquacel.  Avoid direct water beating on bandage.  Continue ICE packs to knee.    5. DVT PE Prophylaxis: EC Aspirin twice daily 325mg  -Continue Pepcid while on Anticoagulant    6. Labs: Check H&H weekly while on Anticoagulation    7. Follow Up: Orthopedics, 10-14 days in office. Call to schedule. If going home, eRX sent to your pharmacy for .

## 2017-08-05 NOTE — DISCHARGE NOTE ADULT - PATIENT PORTAL LINK FT
“You can access the [employer] Patient Portal, offered by [facility], by registering with the following website: [email or web address]”

## 2017-08-06 LAB
ANION GAP SERPL CALC-SCNC: 8 MMOL/L — SIGNIFICANT CHANGE UP (ref 5–17)
BUN SERPL-MCNC: 14 MG/DL — SIGNIFICANT CHANGE UP (ref 7–23)
CALCIUM SERPL-MCNC: 9.6 MG/DL — SIGNIFICANT CHANGE UP (ref 8.5–10.1)
CHLORIDE SERPL-SCNC: 96 MMOL/L — SIGNIFICANT CHANGE UP (ref 96–108)
CO2 SERPL-SCNC: 29 MMOL/L — SIGNIFICANT CHANGE UP (ref 22–31)
CREAT SERPL-MCNC: 0.78 MG/DL — SIGNIFICANT CHANGE UP (ref 0.5–1.3)
GLUCOSE SERPL-MCNC: 100 MG/DL — HIGH (ref 70–99)
HCT VFR BLD CALC: 34.4 % — LOW (ref 34.5–45)
HGB BLD-MCNC: 11.6 G/DL — SIGNIFICANT CHANGE UP (ref 11.5–15.5)
MCHC RBC-ENTMCNC: 27.8 PG — SIGNIFICANT CHANGE UP (ref 27–34)
MCHC RBC-ENTMCNC: 33.8 GM/DL — SIGNIFICANT CHANGE UP (ref 32–36)
MCV RBC AUTO: 82.4 FL — SIGNIFICANT CHANGE UP (ref 80–100)
PLATELET # BLD AUTO: 245 K/UL — SIGNIFICANT CHANGE UP (ref 150–400)
POTASSIUM SERPL-MCNC: 3.3 MMOL/L — LOW (ref 3.5–5.3)
POTASSIUM SERPL-SCNC: 3.3 MMOL/L — LOW (ref 3.5–5.3)
RBC # BLD: 4.18 M/UL — SIGNIFICANT CHANGE UP (ref 3.8–5.2)
RBC # FLD: 13.8 % — SIGNIFICANT CHANGE UP (ref 10.3–14.5)
SODIUM SERPL-SCNC: 133 MMOL/L — LOW (ref 135–145)
WBC # BLD: 13.8 K/UL — HIGH (ref 3.8–10.5)
WBC # FLD AUTO: 13.8 K/UL — HIGH (ref 3.8–10.5)

## 2017-08-06 RX ORDER — AMLODIPINE BESYLATE 2.5 MG/1
5 TABLET ORAL ONCE
Qty: 0 | Refills: 0 | Status: COMPLETED | OUTPATIENT
Start: 2017-08-06 | End: 2017-08-06

## 2017-08-06 RX ORDER — POTASSIUM CHLORIDE 20 MEQ
40 PACKET (EA) ORAL EVERY 4 HOURS
Qty: 0 | Refills: 0 | Status: COMPLETED | OUTPATIENT
Start: 2017-08-06 | End: 2017-08-06

## 2017-08-06 RX ORDER — AMLODIPINE BESYLATE 2.5 MG/1
10 TABLET ORAL DAILY
Qty: 0 | Refills: 0 | Status: DISCONTINUED | OUTPATIENT
Start: 2017-08-07 | End: 2017-08-08

## 2017-08-06 RX ADMIN — Medication 10 MILLIGRAM(S): at 11:40

## 2017-08-06 RX ADMIN — Medication 10 MILLIGRAM(S): at 05:48

## 2017-08-06 RX ADMIN — FAMOTIDINE 20 MILLIGRAM(S): 10 INJECTION INTRAVENOUS at 05:48

## 2017-08-06 RX ADMIN — Medication 325 MILLIGRAM(S): at 05:47

## 2017-08-06 RX ADMIN — Medication 40 MILLIEQUIVALENT(S): at 11:31

## 2017-08-06 RX ADMIN — Medication 325 MILLIGRAM(S): at 11:32

## 2017-08-06 RX ADMIN — Medication 100 MILLIGRAM(S): at 11:33

## 2017-08-06 RX ADMIN — FAMOTIDINE 20 MILLIGRAM(S): 10 INJECTION INTRAVENOUS at 18:44

## 2017-08-06 RX ADMIN — OXYCODONE HYDROCHLORIDE 5 MILLIGRAM(S): 5 TABLET ORAL at 01:00

## 2017-08-06 RX ADMIN — Medication 40 MILLIEQUIVALENT(S): at 07:05

## 2017-08-06 RX ADMIN — Medication 1 MILLIGRAM(S): at 11:32

## 2017-08-06 RX ADMIN — AMLODIPINE BESYLATE 5 MILLIGRAM(S): 2.5 TABLET ORAL at 05:47

## 2017-08-06 RX ADMIN — OXYCODONE HYDROCHLORIDE 5 MILLIGRAM(S): 5 TABLET ORAL at 11:35

## 2017-08-06 RX ADMIN — Medication 100 MILLIGRAM(S): at 21:38

## 2017-08-06 RX ADMIN — SODIUM CHLORIDE 3 MILLILITER(S): 9 INJECTION INTRAMUSCULAR; INTRAVENOUS; SUBCUTANEOUS at 11:40

## 2017-08-06 RX ADMIN — OXYCODONE HYDROCHLORIDE 10 MILLIGRAM(S): 5 TABLET ORAL at 05:46

## 2017-08-06 RX ADMIN — Medication 1 TABLET(S): at 11:33

## 2017-08-06 RX ADMIN — Medication 500 MILLIGRAM(S): at 18:44

## 2017-08-06 RX ADMIN — OXYCODONE HYDROCHLORIDE 5 MILLIGRAM(S): 5 TABLET ORAL at 00:00

## 2017-08-06 RX ADMIN — AMLODIPINE BESYLATE 5 MILLIGRAM(S): 2.5 TABLET ORAL at 11:31

## 2017-08-06 RX ADMIN — SODIUM CHLORIDE 3 MILLILITER(S): 9 INJECTION INTRAMUSCULAR; INTRAVENOUS; SUBCUTANEOUS at 05:39

## 2017-08-06 RX ADMIN — Medication 1.25 MILLIGRAM(S): at 00:01

## 2017-08-06 RX ADMIN — Medication 1 TABLET(S): at 11:31

## 2017-08-06 RX ADMIN — OXYCODONE HYDROCHLORIDE 10 MILLIGRAM(S): 5 TABLET ORAL at 06:45

## 2017-08-06 RX ADMIN — Medication 100 MILLIGRAM(S): at 05:48

## 2017-08-06 RX ADMIN — Medication 325 MILLIGRAM(S): at 18:44

## 2017-08-06 RX ADMIN — Medication 1 TABLET(S): at 21:38

## 2017-08-06 RX ADMIN — OXYCODONE HYDROCHLORIDE 5 MILLIGRAM(S): 5 TABLET ORAL at 12:05

## 2017-08-06 NOTE — PROGRESS NOTE ADULT - SUBJECTIVE AND OBJECTIVE BOX
Pt was confused overnight from dilaudid pca, resolved now. Also her  BP has been elevated.  C/o Lt knee pain, no CP or SOB.    Vital Signs Last 24 Hrs  T(C): 37 (06 Aug 2017 05:43), Max: 37 (06 Aug 2017 05:43)  T(F): 98.6 (06 Aug 2017 05:43), Max: 98.6 (06 Aug 2017 05:43)  HR: 97 (06 Aug 2017 05:43) (83 - 100)  BP: 141/88 (06 Aug 2017 05:43) (141/88 - 177/86)  BP(mean): --  RR: 18 (06 Aug 2017 05:43) (16 - 18)  SpO2: 97% (06 Aug 2017 05:43) (95% - 97%)    Daily     Daily     I&O's Detail    05 Aug 2017 07:01  -  06 Aug 2017 07:00  --------------------------------------------------------  IN:    lactated ringers.: 800 mL    Oral Fluid: 1200 mL  Total IN: 2000 mL    OUT:    Voided: 400 mL  Total OUT: 400 mL    Total NET: 1600 mL          CAPILLARY BLOOD GLUCOSE                                          11.6   13.8  )-----------( 245      ( 06 Aug 2017 06:07 )             34.4       08-06    133<L>  |  96  |  14  ----------------------------<  100<H>  3.3<L>   |  29  |  0.78    Ca    9.6      06 Aug 2017 06:07        PT/INR - ( 04 Aug 2017 15:47 )   PT: 11.3 sec;   INR: 1.05 ratio         PTT - ( 04 Aug 2017 15:47 )  PTT:26.1 sec                MEDICATIONS  (STANDING):  sodium chloride 0.9% lock flush 3 milliLiter(s) IV Push every 8 hours  calcium carbonate 1250 mG + Vitamin D (OsCal 500 + D) 1 Tablet(s) Oral three times a day  famotidine    Tablet 20 milliGRAM(s) Oral every 12 hours  polyethylene glycol 3350 17 Gram(s) Oral daily  docusate sodium 100 milliGRAM(s) Oral three times a day  ferrous    sulfate 325 milliGRAM(s) Oral three times a day with meals  folic acid 1 milliGRAM(s) Oral daily  multivitamin 1 Tablet(s) Oral daily  ascorbic acid 500 milliGRAM(s) Oral two times a day  predniSONE   Tablet 10 milliGRAM(s) Oral daily  aspirin enteric coated 325 milliGRAM(s) Oral two times a day  potassium chloride    Tablet ER 40 milliEquivalent(s) Oral every 4 hours  amLODIPine   Tablet 5 milliGRAM(s) Oral once    MEDICATIONS  (PRN):  acetaminophen   Tablet 650 milliGRAM(s) Oral every 6 hours PRN For Temp greater than 38 C (100.4 F) or headache  oxyCODONE    IR 5 milliGRAM(s) Oral every 4 hours PRN Mild Pain  oxyCODONE    IR 10 milliGRAM(s) Oral every 4 hours PRN Moderate Pain  HYDROmorphone  Injectable 1 milliGRAM(s) SubCutaneous every 3 hours PRN Severe Pain  aluminum hydroxide/magnesium hydroxide/simethicone Suspension 30 milliLiter(s) Oral four times a day PRN Indigestion  ondansetron Injectable 4 milliGRAM(s) IV Push every 6 hours PRN Nausea and/or Vomiting  zaleplon 5 milliGRAM(s) Oral at bedtime PRN Insomnia  magnesium hydroxide Suspension 30 milliLiter(s) Oral daily PRN Constipation  bisacodyl Suppository 10 milliGRAM(s) Rectal daily PRN If no bowel movement by postoperative day #2  senna 2 Tablet(s) Oral at bedtime PRN Constipation  diphenhydrAMINE   Capsule 50 milliGRAM(s) Oral every 4 hours PRN Rash and/or Itching  benzocaine 15 mG/menthol 3.6 mG Lozenge 1 Lozenge Oral every 2 hours PRN Sore Throat  enalaprilat Injectable 1.25 milliGRAM(s) IV Push every 6 hours PRN systolic BP > 150

## 2017-08-06 NOTE — PROGRESS NOTE ADULT - ASSESSMENT
Pt is a 72 y/o female with h/o HTN, rheumatoid arthritis on chronic prednisone who s/p failed Lt knee replacement in 2016.  She has been having increasing Lt knee pain therefore she was admitted for Lt failed total knee arthroplasty.  Post-op medicine consult called for comanagement.      * Lt knee pain-s/p Lt total knee arthroplasty, continue pain control (now off Dilaudid due to confusion), PT, DVT proph, monitor post-op labs.  * Acute Blood Loss Anemia-surgical loss, monitor, po iron.  * HTN-bp is elevated, increase amlodipine, monitor.  * Rheumatoid Arthritis-continue po prednisone which she is on chronically, s/p IV steroids in OR, monitor for adrenal insufficiency  * Leukocytosis-reactive from surgery, anemia and steroids.  Monitor for now since she is non-toxic, asymptomatic and its improving.  * Proph- aspirin  * Disp-OOB, PT   * Comm- d/w pt, RN

## 2017-08-06 NOTE — PROGRESS NOTE ADULT - SUBJECTIVE AND OBJECTIVE BOX
Patient seen and examined. Pain controlled. No acute events overnight.    Vital Signs Last 24 Hrs  T(C): 37 (08-06-17 @ 05:43), Max: 37 (08-06-17 @ 05:43)  T(F): 98.6 (08-06-17 @ 05:43), Max: 98.6 (08-06-17 @ 05:43)  HR: 97 (08-06-17 @ 05:43) (83 - 100)  BP: 141/88 (08-06-17 @ 05:43) (141/88 - 177/86)  BP(mean): --  RR: 18 (08-06-17 @ 05:43) (16 - 18)  SpO2: 97% (08-06-17 @ 05:43) (95% - 97%)    Physical Exam  Gen: NAD  LLE:   Dressing c/d/i  +EHL/FHL/Gsc/TA  SILT L3-S1  DP +  Compartments soft  No calf ttp    A/P: 71y Female sp L Revision TKA POD 2  Pain control  DVT ppx  PT/OT, WBAT  KI While in bed asleep  FU labs  Dispo planning  D/w attending

## 2017-08-07 LAB
ANION GAP SERPL CALC-SCNC: 8 MMOL/L — SIGNIFICANT CHANGE UP (ref 5–17)
BUN SERPL-MCNC: 24 MG/DL — HIGH (ref 7–23)
CALCIUM SERPL-MCNC: 10 MG/DL — SIGNIFICANT CHANGE UP (ref 8.5–10.1)
CHLORIDE SERPL-SCNC: 101 MMOL/L — SIGNIFICANT CHANGE UP (ref 96–108)
CO2 SERPL-SCNC: 29 MMOL/L — SIGNIFICANT CHANGE UP (ref 22–31)
CREAT SERPL-MCNC: 0.91 MG/DL — SIGNIFICANT CHANGE UP (ref 0.5–1.3)
GLUCOSE SERPL-MCNC: 128 MG/DL — HIGH (ref 70–99)
HCT VFR BLD CALC: 31.9 % — LOW (ref 34.5–45)
HGB BLD-MCNC: 10.9 G/DL — LOW (ref 11.5–15.5)
MCHC RBC-ENTMCNC: 28.1 PG — SIGNIFICANT CHANGE UP (ref 27–34)
MCHC RBC-ENTMCNC: 34.3 GM/DL — SIGNIFICANT CHANGE UP (ref 32–36)
MCV RBC AUTO: 82 FL — SIGNIFICANT CHANGE UP (ref 80–100)
PLATELET # BLD AUTO: 250 K/UL — SIGNIFICANT CHANGE UP (ref 150–400)
POTASSIUM SERPL-MCNC: 4.2 MMOL/L — SIGNIFICANT CHANGE UP (ref 3.5–5.3)
POTASSIUM SERPL-SCNC: 4.2 MMOL/L — SIGNIFICANT CHANGE UP (ref 3.5–5.3)
RBC # BLD: 3.89 M/UL — SIGNIFICANT CHANGE UP (ref 3.8–5.2)
RBC # FLD: 13.8 % — SIGNIFICANT CHANGE UP (ref 10.3–14.5)
SODIUM SERPL-SCNC: 138 MMOL/L — SIGNIFICANT CHANGE UP (ref 135–145)
WBC # BLD: 12.1 K/UL — HIGH (ref 3.8–10.5)
WBC # FLD AUTO: 12.1 K/UL — HIGH (ref 3.8–10.5)

## 2017-08-07 RX ORDER — ASPIRIN/CALCIUM CARB/MAGNESIUM 324 MG
1 TABLET ORAL
Qty: 60 | Refills: 0 | OUTPATIENT
Start: 2017-08-07 | End: 2017-09-06

## 2017-08-07 RX ORDER — TRAMADOL HYDROCHLORIDE 50 MG/1
25 TABLET ORAL EVERY 4 HOURS
Qty: 0 | Refills: 0 | Status: DISCONTINUED | OUTPATIENT
Start: 2017-08-07 | End: 2017-08-08

## 2017-08-07 RX ORDER — ACETAMINOPHEN 500 MG
650 TABLET ORAL EVERY 6 HOURS
Qty: 0 | Refills: 0 | Status: DISCONTINUED | OUTPATIENT
Start: 2017-08-07 | End: 2017-08-08

## 2017-08-07 RX ORDER — OXYCODONE HYDROCHLORIDE 5 MG/1
5 TABLET ORAL EVERY 4 HOURS
Qty: 0 | Refills: 0 | Status: DISCONTINUED | OUTPATIENT
Start: 2017-08-07 | End: 2017-08-07

## 2017-08-07 RX ORDER — DOCUSATE SODIUM 100 MG
1 CAPSULE ORAL
Qty: 14 | Refills: 0 | OUTPATIENT
Start: 2017-08-07 | End: 2017-08-14

## 2017-08-07 RX ORDER — KETOROLAC TROMETHAMINE 30 MG/ML
15 SYRINGE (ML) INJECTION ONCE
Qty: 0 | Refills: 0 | Status: DISCONTINUED | OUTPATIENT
Start: 2017-08-07 | End: 2017-08-07

## 2017-08-07 RX ORDER — FAMOTIDINE 10 MG/ML
1 INJECTION INTRAVENOUS
Qty: 60 | Refills: 0 | OUTPATIENT
Start: 2017-08-07 | End: 2017-09-06

## 2017-08-07 RX ADMIN — Medication 1 TABLET(S): at 21:32

## 2017-08-07 RX ADMIN — AMLODIPINE BESYLATE 10 MILLIGRAM(S): 2.5 TABLET ORAL at 06:26

## 2017-08-07 RX ADMIN — Medication 10 MILLIGRAM(S): at 06:26

## 2017-08-07 RX ADMIN — OXYCODONE HYDROCHLORIDE 10 MILLIGRAM(S): 5 TABLET ORAL at 11:07

## 2017-08-07 RX ADMIN — Medication 100 MILLIGRAM(S): at 21:32

## 2017-08-07 RX ADMIN — Medication 15 MILLIGRAM(S): at 15:40

## 2017-08-07 RX ADMIN — Medication 1 TABLET(S): at 06:26

## 2017-08-07 RX ADMIN — Medication 1 TABLET(S): at 11:07

## 2017-08-07 RX ADMIN — Medication 15 MILLIGRAM(S): at 15:55

## 2017-08-07 RX ADMIN — Medication 325 MILLIGRAM(S): at 17:09

## 2017-08-07 RX ADMIN — OXYCODONE HYDROCHLORIDE 10 MILLIGRAM(S): 5 TABLET ORAL at 01:34

## 2017-08-07 RX ADMIN — Medication 1 TABLET(S): at 13:55

## 2017-08-07 RX ADMIN — Medication 1 MILLIGRAM(S): at 11:07

## 2017-08-07 RX ADMIN — Medication 500 MILLIGRAM(S): at 17:09

## 2017-08-07 RX ADMIN — SODIUM CHLORIDE 3 MILLILITER(S): 9 INJECTION INTRAMUSCULAR; INTRAVENOUS; SUBCUTANEOUS at 21:27

## 2017-08-07 RX ADMIN — Medication 325 MILLIGRAM(S): at 06:25

## 2017-08-07 RX ADMIN — Medication 100 MILLIGRAM(S): at 13:55

## 2017-08-07 RX ADMIN — OXYCODONE HYDROCHLORIDE 10 MILLIGRAM(S): 5 TABLET ORAL at 06:35

## 2017-08-07 RX ADMIN — Medication 325 MILLIGRAM(S): at 09:05

## 2017-08-07 RX ADMIN — OXYCODONE HYDROCHLORIDE 10 MILLIGRAM(S): 5 TABLET ORAL at 12:05

## 2017-08-07 RX ADMIN — OXYCODONE HYDROCHLORIDE 10 MILLIGRAM(S): 5 TABLET ORAL at 07:05

## 2017-08-07 RX ADMIN — FAMOTIDINE 20 MILLIGRAM(S): 10 INJECTION INTRAVENOUS at 17:09

## 2017-08-07 RX ADMIN — POLYETHYLENE GLYCOL 3350 17 GRAM(S): 17 POWDER, FOR SOLUTION ORAL at 11:07

## 2017-08-07 RX ADMIN — Medication 500 MILLIGRAM(S): at 06:26

## 2017-08-07 RX ADMIN — Medication 100 MILLIGRAM(S): at 06:26

## 2017-08-07 RX ADMIN — Medication 325 MILLIGRAM(S): at 12:45

## 2017-08-07 RX ADMIN — SODIUM CHLORIDE 3 MILLILITER(S): 9 INJECTION INTRAMUSCULAR; INTRAVENOUS; SUBCUTANEOUS at 06:29

## 2017-08-07 RX ADMIN — SODIUM CHLORIDE 3 MILLILITER(S): 9 INJECTION INTRAMUSCULAR; INTRAVENOUS; SUBCUTANEOUS at 13:53

## 2017-08-07 RX ADMIN — FAMOTIDINE 20 MILLIGRAM(S): 10 INJECTION INTRAVENOUS at 06:25

## 2017-08-07 RX ADMIN — SODIUM CHLORIDE 3 MILLILITER(S): 9 INJECTION INTRAMUSCULAR; INTRAVENOUS; SUBCUTANEOUS at 06:37

## 2017-08-07 NOTE — PROGRESS NOTE ADULT - ASSESSMENT
A/P: 71 F s/p L revision TKA POD 3  Pain control  DVT ppx  PT, WBAT  KI while in bed sleeping  FU labs  Dispo planning, plan to go home today

## 2017-08-07 NOTE — PROGRESS NOTE ADULT - SUBJECTIVE AND OBJECTIVE BOX
Pt c/o Lt knee pain with uneventful night.  No new complaints, BP better.    Vital Signs Last 24 Hrs  T(C): 36.9 (07 Aug 2017 05:08), Max: 36.9 (07 Aug 2017 05:08)  T(F): 98.4 (07 Aug 2017 05:08), Max: 98.4 (07 Aug 2017 05:08)  HR: 96 (07 Aug 2017 05:08) (90 - 96)  BP: 111/77 (07 Aug 2017 05:08) (111/77 - 116/74)  BP(mean): --  RR: 18 (07 Aug 2017 05:08) (18 - 18)  SpO2: 94% (07 Aug 2017 05:08) (94% - 100%)    Daily     Daily     I&O's Detail      CAPILLARY BLOOD GLUCOSE                                          10.9   12.1  )-----------( 250      ( 07 Aug 2017 05:33 )             31.9       08-07    138  |  101  |  24<H>  ----------------------------<  128<H>  4.2   |  29  |  0.91    Ca    10.0      07 Aug 2017 05:33                        MEDICATIONS  (STANDING):  sodium chloride 0.9% lock flush 3 milliLiter(s) IV Push every 8 hours  calcium carbonate 1250 mG + Vitamin D (OsCal 500 + D) 1 Tablet(s) Oral three times a day  famotidine    Tablet 20 milliGRAM(s) Oral every 12 hours  polyethylene glycol 3350 17 Gram(s) Oral daily  docusate sodium 100 milliGRAM(s) Oral three times a day  ferrous    sulfate 325 milliGRAM(s) Oral three times a day with meals  folic acid 1 milliGRAM(s) Oral daily  multivitamin 1 Tablet(s) Oral daily  ascorbic acid 500 milliGRAM(s) Oral two times a day  predniSONE   Tablet 10 milliGRAM(s) Oral daily  aspirin enteric coated 325 milliGRAM(s) Oral two times a day  amLODIPine   Tablet 10 milliGRAM(s) Oral daily    MEDICATIONS  (PRN):  acetaminophen   Tablet 650 milliGRAM(s) Oral every 6 hours PRN For Temp greater than 38 C (100.4 F) or headache  oxyCODONE    IR 5 milliGRAM(s) Oral every 4 hours PRN Mild Pain  oxyCODONE    IR 10 milliGRAM(s) Oral every 4 hours PRN Moderate Pain  HYDROmorphone  Injectable 1 milliGRAM(s) SubCutaneous every 3 hours PRN Severe Pain  aluminum hydroxide/magnesium hydroxide/simethicone Suspension 30 milliLiter(s) Oral four times a day PRN Indigestion  ondansetron Injectable 4 milliGRAM(s) IV Push every 6 hours PRN Nausea and/or Vomiting  zaleplon 5 milliGRAM(s) Oral at bedtime PRN Insomnia  magnesium hydroxide Suspension 30 milliLiter(s) Oral daily PRN Constipation  bisacodyl Suppository 10 milliGRAM(s) Rectal daily PRN If no bowel movement by postoperative day #2  senna 2 Tablet(s) Oral at bedtime PRN Constipation  diphenhydrAMINE   Capsule 50 milliGRAM(s) Oral every 4 hours PRN Rash and/or Itching  benzocaine 15 mG/menthol 3.6 mG Lozenge 1 Lozenge Oral every 2 hours PRN Sore Throat  enalaprilat Injectable 1.25 milliGRAM(s) IV Push every 6 hours PRN systolic BP > 150

## 2017-08-07 NOTE — PROGRESS NOTE ADULT - ASSESSMENT
Pt is a 72 y/o female with h/o HTN, rheumatoid arthritis on chronic prednisone who s/p failed Lt knee replacement in 2016.  She has been having increasing Lt knee pain therefore she was admitted for Lt failed total knee arthroplasty.  Post-op medicine consult called for comanagement.      * Lt knee pain-s/p Lt total knee arthroplasty, continue pain control, PT, DVT proph, monitor post-op labs.  * Acute Blood Loss Anemia-surgical loss, monitor, po iron.  * HTN-bp stable on amlodipine, monitor.  * Rheumatoid Arthritis-continue po prednisone which she is on chronically, s/p IV steroids in OR, no signs for adrenal insufficiency  * Leukocytosis-reactive from surgery, anemia and steroids.  Monitor for now since she is non-toxic, asymptomatic and its improving.  * Proph- aspirin  * Disp-OOB, PT, d/c planning, pt is medically stable for d/c.  * Comm- d/w pt, RN

## 2017-08-07 NOTE — PROGRESS NOTE ADULT - SUBJECTIVE AND OBJECTIVE BOX
Pt S/E at bedside, no acute events overnight, pain controlled. Patient resting comfortably    Vital Signs Last 24 Hrs  T(C): 36.9 (07 Aug 2017 05:08), Max: 36.9 (07 Aug 2017 05:08)  T(F): 98.4 (07 Aug 2017 05:08), Max: 98.4 (07 Aug 2017 05:08)  HR: 96 (07 Aug 2017 05:08) (90 - 96)  BP: 111/77 (07 Aug 2017 05:08) (111/77 - 116/74)  RR: 18 (07 Aug 2017 05:08) (18 - 18)  SpO2: 94% (07 Aug 2017 05:08) (94% - 100%)    AVSS  Gen: NAD,    Left Lower Extremity:  Dressing clean dry intact  +EHL/FHL/TA/GS  SILT L3-S1  +DP/PT Pulses  Compartments soft  No calf TTP B/L

## 2017-08-08 VITALS
TEMPERATURE: 99 F | OXYGEN SATURATION: 97 % | RESPIRATION RATE: 15 BRPM | HEART RATE: 91 BPM | SYSTOLIC BLOOD PRESSURE: 122 MMHG | DIASTOLIC BLOOD PRESSURE: 71 MMHG

## 2017-08-08 LAB
ANION GAP SERPL CALC-SCNC: 5 MMOL/L — SIGNIFICANT CHANGE UP (ref 5–17)
BUN SERPL-MCNC: 37 MG/DL — HIGH (ref 7–23)
CALCIUM SERPL-MCNC: 9.5 MG/DL — SIGNIFICANT CHANGE UP (ref 8.5–10.1)
CHLORIDE SERPL-SCNC: 105 MMOL/L — SIGNIFICANT CHANGE UP (ref 96–108)
CO2 SERPL-SCNC: 29 MMOL/L — SIGNIFICANT CHANGE UP (ref 22–31)
CREAT SERPL-MCNC: 1.03 MG/DL — SIGNIFICANT CHANGE UP (ref 0.5–1.3)
GLUCOSE SERPL-MCNC: 124 MG/DL — HIGH (ref 70–99)
HCT VFR BLD CALC: 31.6 % — LOW (ref 34.5–45)
HGB BLD-MCNC: 10.8 G/DL — LOW (ref 11.5–15.5)
MCHC RBC-ENTMCNC: 28.4 PG — SIGNIFICANT CHANGE UP (ref 27–34)
MCHC RBC-ENTMCNC: 34.1 GM/DL — SIGNIFICANT CHANGE UP (ref 32–36)
MCV RBC AUTO: 83.2 FL — SIGNIFICANT CHANGE UP (ref 80–100)
PLATELET # BLD AUTO: 260 K/UL — SIGNIFICANT CHANGE UP (ref 150–400)
POTASSIUM SERPL-MCNC: 4.1 MMOL/L — SIGNIFICANT CHANGE UP (ref 3.5–5.3)
POTASSIUM SERPL-SCNC: 4.1 MMOL/L — SIGNIFICANT CHANGE UP (ref 3.5–5.3)
RBC # BLD: 3.79 M/UL — LOW (ref 3.8–5.2)
RBC # FLD: 13.5 % — SIGNIFICANT CHANGE UP (ref 10.3–14.5)
SODIUM SERPL-SCNC: 139 MMOL/L — SIGNIFICANT CHANGE UP (ref 135–145)
SURGICAL PATHOLOGY FINAL REPORT - CH: SIGNIFICANT CHANGE UP
WBC # BLD: 9.4 K/UL — SIGNIFICANT CHANGE UP (ref 3.8–10.5)
WBC # FLD AUTO: 9.4 K/UL — SIGNIFICANT CHANGE UP (ref 3.8–10.5)

## 2017-08-08 RX ORDER — KETOROLAC TROMETHAMINE 30 MG/ML
15 SYRINGE (ML) INJECTION ONCE
Qty: 0 | Refills: 0 | Status: DISCONTINUED | OUTPATIENT
Start: 2017-08-08 | End: 2017-08-08

## 2017-08-08 RX ADMIN — Medication 10 MILLIGRAM(S): at 06:28

## 2017-08-08 RX ADMIN — Medication 1 TABLET(S): at 06:28

## 2017-08-08 RX ADMIN — Medication 325 MILLIGRAM(S): at 06:28

## 2017-08-08 RX ADMIN — Medication 100 MILLIGRAM(S): at 06:28

## 2017-08-08 RX ADMIN — TRAMADOL HYDROCHLORIDE 25 MILLIGRAM(S): 50 TABLET ORAL at 08:57

## 2017-08-08 RX ADMIN — Medication 500 MILLIGRAM(S): at 06:28

## 2017-08-08 RX ADMIN — Medication 15 MILLIGRAM(S): at 12:08

## 2017-08-08 RX ADMIN — SODIUM CHLORIDE 3 MILLILITER(S): 9 INJECTION INTRAMUSCULAR; INTRAVENOUS; SUBCUTANEOUS at 06:28

## 2017-08-08 RX ADMIN — FAMOTIDINE 20 MILLIGRAM(S): 10 INJECTION INTRAVENOUS at 06:28

## 2017-08-08 RX ADMIN — AMLODIPINE BESYLATE 10 MILLIGRAM(S): 2.5 TABLET ORAL at 06:28

## 2017-08-08 NOTE — PROGRESS NOTE ADULT - SUBJECTIVE AND OBJECTIVE BOX
Pt c/o Lt knee pain otherwise uneventful night.  No CP or SOB.    Vital Signs Last 24 Hrs  T(C): 36.6 (08 Aug 2017 04:21), Max: 36.7 (07 Aug 2017 23:29)  T(F): 97.9 (08 Aug 2017 04:21), Max: 98 (07 Aug 2017 23:29)  HR: 86 (08 Aug 2017 04:21) (86 - 95)  BP: 112/71 (08 Aug 2017 04:21) (112/71 - 128/79)  BP(mean): --  RR: 16 (08 Aug 2017 04:21) (16 - 17)  SpO2: 97% (08 Aug 2017 04:21) (94% - 97%)    Daily     Daily     I&O's Detail    07 Aug 2017 07:01  -  08 Aug 2017 07:00  --------------------------------------------------------  IN:    Oral Fluid: 360 mL  Total IN: 360 mL    OUT:    Voided: 1 mL  Total OUT: 1 mL    Total NET: 359 mL          CAPILLARY BLOOD GLUCOSE                                          10.8   9.4   )-----------( 260      ( 08 Aug 2017 05:48 )             31.6       08-08    139  |  105  |  37<H>  ----------------------------<  124<H>  4.1   |  29  |  1.03    Ca    9.5      08 Aug 2017 05:48                        MEDICATIONS  (STANDING):  sodium chloride 0.9% lock flush 3 milliLiter(s) IV Push every 8 hours  calcium carbonate 1250 mG + Vitamin D (OsCal 500 + D) 1 Tablet(s) Oral three times a day  famotidine    Tablet 20 milliGRAM(s) Oral every 12 hours  polyethylene glycol 3350 17 Gram(s) Oral daily  docusate sodium 100 milliGRAM(s) Oral three times a day  ferrous    sulfate 325 milliGRAM(s) Oral three times a day with meals  folic acid 1 milliGRAM(s) Oral daily  multivitamin 1 Tablet(s) Oral daily  ascorbic acid 500 milliGRAM(s) Oral two times a day  predniSONE   Tablet 10 milliGRAM(s) Oral daily  aspirin enteric coated 325 milliGRAM(s) Oral two times a day  amLODIPine   Tablet 10 milliGRAM(s) Oral daily    MEDICATIONS  (PRN):  acetaminophen   Tablet 650 milliGRAM(s) Oral every 6 hours PRN For Temp greater than 38 C (100.4 F) or headache  aluminum hydroxide/magnesium hydroxide/simethicone Suspension 30 milliLiter(s) Oral four times a day PRN Indigestion  ondansetron Injectable 4 milliGRAM(s) IV Push every 6 hours PRN Nausea and/or Vomiting  zaleplon 5 milliGRAM(s) Oral at bedtime PRN Insomnia  magnesium hydroxide Suspension 30 milliLiter(s) Oral daily PRN Constipation  bisacodyl Suppository 10 milliGRAM(s) Rectal daily PRN If no bowel movement by postoperative day #2  senna 2 Tablet(s) Oral at bedtime PRN Constipation  diphenhydrAMINE   Capsule 50 milliGRAM(s) Oral every 4 hours PRN Rash and/or Itching  benzocaine 15 mG/menthol 3.6 mG Lozenge 1 Lozenge Oral every 2 hours PRN Sore Throat  enalaprilat Injectable 1.25 milliGRAM(s) IV Push every 6 hours PRN systolic BP > 150  traMADol 25 milliGRAM(s) Oral every 4 hours PRN Moderate Pain (4 - 6)  acetaminophen   Tablet. 650 milliGRAM(s) Oral every 6 hours PRN Mild Pain (1 - 3)

## 2017-08-08 NOTE — PROGRESS NOTE ADULT - RS GEN PE MLT RESP DETAILS PC
clear to auscultation bilaterally/breath sounds equal
clear to auscultation bilaterally/breath sounds equal
breath sounds equal/clear to auscultation bilaterally
clear to auscultation bilaterally/breath sounds equal

## 2017-08-08 NOTE — PROGRESS NOTE ADULT - SUBJECTIVE AND OBJECTIVE BOX
Patient seen and examined. Pain controlled. No acute events overnight    Vital Signs Last 24 Hrs  T(C): 36.6 (08-08-17 @ 04:21), Max: 36.7 (08-07-17 @ 23:29)  T(F): 97.9 (08-08-17 @ 04:21), Max: 98 (08-07-17 @ 23:29)  HR: 86 (08-08-17 @ 04:21) (86 - 95)  BP: 112/71 (08-08-17 @ 04:21) (112/71 - 128/79)  BP(mean): --  RR: 16 (08-08-17 @ 04:21) (16 - 17)  SpO2: 97% (08-08-17 @ 04:21) (94% - 97%)    Physical Exam  Gen: NAD  LLE:   Dressing c/d/i  +EHL/FHL/Gsc/TA  SILT L3-S1  DP +  Compartments soft  No calf ttp    A/P: 71y Female sp L Revision TKA POD 4  Pain control  DVT ppx  PT/OT, WBAT  FU labs  Dispo planning- Home today  D/w attending

## 2017-08-08 NOTE — PROGRESS NOTE ADULT - SUBJECTIVE AND OBJECTIVE BOX
Orthopedics     POD 4 Total Knee Arthroplasty  Pain is controlled. Pt feeling better. No nausea or vomiting. Has been OOB with PT.    Vital Signs Last 24 Hrs  T(C): 36.6 (08-08-17 @ 04:21), Max: 36.7 (08-07-17 @ 23:29)  T(F): 97.9 (08-08-17 @ 04:21), Max: 98 (08-07-17 @ 23:29)  HR: 86 (08-08-17 @ 04:21) (86 - 95)  BP: 112/71 (08-08-17 @ 04:21) (112/71 - 128/79)  BP(mean): --  RR: 16 (08-08-17 @ 04:21) (16 - 17)  SpO2: 97% (08-08-17 @ 04:21) (94% - 97%)                        10.8   9.4   )-----------( 260      ( 08 Aug 2017 05:48 )             31.6     08 Aug 2017 05:48    139    |  105    |  37     ----------------------------<  124    4.1     |  29     |  1.03     Ca    9.5        08 Aug 2017 05:48          Exam:  NAD AAOx3  Wound without erythema or drainage  +EHL FHL TA GS  Mild expected swelling of knee  SILT toes 1-5  +DP  Calf Soft NT    A/P:  Stable POD 4 Left Total Knee Revision Arthroplasty  -Analgesia  -DVT PE ppx  -OOB PT  -DC planning for today

## 2017-08-08 NOTE — PROGRESS NOTE ADULT - GASTROINTESTINAL DETAILS
bowel sounds normal/nontender/no distention/soft
soft/nontender/bowel sounds normal/no distention
bowel sounds normal/no distention/nontender/soft
nontender/bowel sounds normal/no distention/soft

## 2017-08-08 NOTE — PROGRESS NOTE ADULT - PSYCHIATRIC DETAILS
normal behavior/normal affect
normal affect/normal behavior
normal behavior/normal affect
normal affect/normal behavior

## 2017-08-09 LAB
CULTURE RESULTS: SIGNIFICANT CHANGE UP
SPECIMEN SOURCE: SIGNIFICANT CHANGE UP

## 2017-08-10 DIAGNOSIS — I10 ESSENTIAL (PRIMARY) HYPERTENSION: ICD-10-CM

## 2017-08-10 DIAGNOSIS — T84.033A MECHANICAL LOOSENING OF INTERNAL LEFT KNEE PROSTHETIC JOINT, INITIAL ENCOUNTER: ICD-10-CM

## 2017-08-10 DIAGNOSIS — D72.829 ELEVATED WHITE BLOOD CELL COUNT, UNSPECIFIED: ICD-10-CM

## 2017-08-10 DIAGNOSIS — M06.9 RHEUMATOID ARTHRITIS, UNSPECIFIED: ICD-10-CM
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
## 2017-08-10 DIAGNOSIS — D62 ACUTE POSTHEMORRHAGIC ANEMIA: ICD-10-CM

## 2017-08-10 DIAGNOSIS — Z79.899 OTHER LONG TERM (CURRENT) DRUG THERAPY: ICD-10-CM

## 2017-08-21 ENCOUNTER — EMERGENCY (EMERGENCY)
Facility: HOSPITAL | Age: 72
LOS: 0 days | Discharge: HOME | End: 2017-08-21
Attending: EMERGENCY MEDICINE | Admitting: EMERGENCY MEDICINE
Payer: MEDICARE

## 2017-08-21 VITALS
DIASTOLIC BLOOD PRESSURE: 88 MMHG | SYSTOLIC BLOOD PRESSURE: 159 MMHG | WEIGHT: 156.09 LBS | HEART RATE: 89 BPM | TEMPERATURE: 98 F | OXYGEN SATURATION: 100 % | RESPIRATION RATE: 18 BRPM

## 2017-08-21 DIAGNOSIS — T84.093A OTHER MECHANICAL COMPLICATION OF INTERNAL LEFT KNEE PROSTHESIS, INITIAL ENCOUNTER: Chronic | ICD-10-CM

## 2017-08-21 DIAGNOSIS — Z48.00 ENCOUNTER FOR CHANGE OR REMOVAL OF NONSURGICAL WOUND DRESSING: ICD-10-CM

## 2017-08-21 DIAGNOSIS — M06.9 RHEUMATOID ARTHRITIS, UNSPECIFIED: ICD-10-CM

## 2017-08-21 DIAGNOSIS — Z98.890 OTHER SPECIFIED POSTPROCEDURAL STATES: Chronic | ICD-10-CM

## 2017-08-21 DIAGNOSIS — Z98.890 OTHER SPECIFIED POSTPROCEDURAL STATES: ICD-10-CM

## 2017-08-21 DIAGNOSIS — Z96.651 PRESENCE OF RIGHT ARTIFICIAL KNEE JOINT: ICD-10-CM

## 2017-08-21 DIAGNOSIS — I10 ESSENTIAL (PRIMARY) HYPERTENSION: ICD-10-CM

## 2017-08-21 DIAGNOSIS — Z98.41 CATARACT EXTRACTION STATUS, RIGHT EYE: ICD-10-CM

## 2017-08-21 DIAGNOSIS — Z96.651 PRESENCE OF RIGHT ARTIFICIAL KNEE JOINT: Chronic | ICD-10-CM

## 2017-08-21 DIAGNOSIS — R26.81 UNSTEADINESS ON FEET: ICD-10-CM

## 2017-08-21 DIAGNOSIS — Z80.1 FAMILY HISTORY OF MALIGNANT NEOPLASM OF TRACHEA, BRONCHUS AND LUNG: ICD-10-CM

## 2017-08-21 DIAGNOSIS — Z82.49 FAMILY HISTORY OF ISCHEMIC HEART DISEASE AND OTHER DISEASES OF THE CIRCULATORY SYSTEM: ICD-10-CM

## 2017-08-21 PROCEDURE — 99283 EMERGENCY DEPT VISIT LOW MDM: CPT

## 2017-08-21 NOTE — ED ADULT NURSE NOTE - OBJECTIVE STATEMENT
Pt has left knee replacement on august 4. Pt states her sutures are opening up and sent in by Dr. Rodriguez to clean area and re-suture.

## 2017-08-21 NOTE — ED ADULT NURSE NOTE - CHIEF COMPLAINT QUOTE
pt states she had sutures removed out of her left leg yesterday by visiting nurse service and pt states wound is still open needs to be cleaned and sutured again as per Dr. Rodriguez

## 2017-08-21 NOTE — CONSULT NOTE ADULT - SUBJECTIVE AND OBJECTIVE BOX
71y Female community ambulator POD 15 LEft knee revision arthroplasty. She was at PT today and therapist concerned wound was open. Pt called the office and was told to come to the ED. She is seen today by ortho at their request. Denies numbness/tingling. Denies any drainage. Denies fever/chills. Denies pain/injury elsewhere.     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 Nothing to suggest sepsis. Wound is without any open area. It was closely inspected along with Ortho resident. Edges were examined with knee in slight flexion as well. Edges are healing. There is some notable infolding of wound edges due to previous surgical staples which is normal, but no open area. AROM 0-90.  Ext mech intact. +EHL/FHL/TA/GS function, no calf TTP, DP/PT pulses intact, compartments soft.       A/P: 71y Female POD 15 Left knee revision arthroplasty.  WBAT, Can Resume PT  FU with Orthopedist as outpt  Orthopedically stable for DC  Dr. Rodriguez made aware of above

## 2017-08-21 NOTE — ED PROVIDER NOTE - CPE EDP PSYCH NORM
Abdomen soft, non-tender and non-distended without organomegaly or masses. Normal bowel sounds. normal...

## 2017-08-21 NOTE — ED PROVIDER NOTE - ATTENDING CONTRIBUTION TO CARE
I personally saw the patient with the PA, and completed the key components of the history and physical exam. I then discussed the management plan with the PA.   gen in nad skin + left knee incision c/d/i no signs overlying infection f.u with ortho as scheduled

## 2017-08-21 NOTE — ED PROVIDER NOTE - MEDICAL DECISION MAKING DETAILS
70 yo female presents with opened L knee surgical wound since friday. Called to come to have it washed out and resutured. Consult ortho. -Manuel Hampton PA-C

## 2017-08-21 NOTE — ED PROVIDER NOTE - OBJECTIVE STATEMENT
72 yo female with a PMH of b/l knee replacement, L knee replacement on August 4th with Dr. Rodriguez presents with wound check. Pt was seen by visiting nurse friday and had staples removed but with the wound still opened. Was seen at PT and they called the office who told her to come to the ER and had the wound cleaned out and resutured. Denies fever, redness, swelling, bleeding or discharge.

## 2017-11-01 ENCOUNTER — INPATIENT (INPATIENT)
Facility: HOSPITAL | Age: 72
LOS: 1 days | Discharge: HOME | End: 2017-11-03
Attending: HOSPITALIST | Admitting: HOSPITALIST
Payer: MEDICARE

## 2017-11-01 VITALS — WEIGHT: 162.92 LBS

## 2017-11-01 DIAGNOSIS — T84.093A OTHER MECHANICAL COMPLICATION OF INTERNAL LEFT KNEE PROSTHESIS, INITIAL ENCOUNTER: Chronic | ICD-10-CM

## 2017-11-01 DIAGNOSIS — Z98.890 OTHER SPECIFIED POSTPROCEDURAL STATES: Chronic | ICD-10-CM

## 2017-11-01 DIAGNOSIS — Z96.651 PRESENCE OF RIGHT ARTIFICIAL KNEE JOINT: Chronic | ICD-10-CM

## 2017-11-01 LAB
ALBUMIN SERPL ELPH-MCNC: 3.5 G/DL — SIGNIFICANT CHANGE UP (ref 3.3–5)
ALP SERPL-CCNC: 114 U/L — SIGNIFICANT CHANGE UP (ref 40–120)
ALT FLD-CCNC: 23 U/L — SIGNIFICANT CHANGE UP (ref 12–78)
ANION GAP SERPL CALC-SCNC: 4 MMOL/L — LOW (ref 5–17)
ANION GAP SERPL CALC-SCNC: 9 MMOL/L — SIGNIFICANT CHANGE UP (ref 5–17)
APTT BLD: 28.3 SEC — SIGNIFICANT CHANGE UP (ref 27.5–37.4)
AST SERPL-CCNC: 16 U/L — SIGNIFICANT CHANGE UP (ref 15–37)
BASOPHILS # BLD AUTO: 0 K/UL — SIGNIFICANT CHANGE UP (ref 0–0.2)
BASOPHILS NFR BLD AUTO: 0.6 % — SIGNIFICANT CHANGE UP (ref 0–2)
BILIRUB SERPL-MCNC: 0.3 MG/DL — SIGNIFICANT CHANGE UP (ref 0.2–1.2)
BUN SERPL-MCNC: 16 MG/DL — SIGNIFICANT CHANGE UP (ref 7–23)
BUN SERPL-MCNC: 17 MG/DL — SIGNIFICANT CHANGE UP (ref 7–23)
CALCIUM SERPL-MCNC: 8.5 MG/DL — SIGNIFICANT CHANGE UP (ref 8.5–10.1)
CALCIUM SERPL-MCNC: 9.1 MG/DL — SIGNIFICANT CHANGE UP (ref 8.5–10.1)
CHLORIDE SERPL-SCNC: 108 MMOL/L — SIGNIFICANT CHANGE UP (ref 96–108)
CHLORIDE SERPL-SCNC: 109 MMOL/L — HIGH (ref 96–108)
CO2 SERPL-SCNC: 27 MMOL/L — SIGNIFICANT CHANGE UP (ref 22–31)
CO2 SERPL-SCNC: 30 MMOL/L — SIGNIFICANT CHANGE UP (ref 22–31)
CREAT SERPL-MCNC: 0.82 MG/DL — SIGNIFICANT CHANGE UP (ref 0.5–1.3)
CREAT SERPL-MCNC: 0.9 MG/DL — SIGNIFICANT CHANGE UP (ref 0.5–1.3)
EOSINOPHIL # BLD AUTO: 0.1 K/UL — SIGNIFICANT CHANGE UP (ref 0–0.5)
EOSINOPHIL NFR BLD AUTO: 1 % — SIGNIFICANT CHANGE UP (ref 0–6)
GLUCOSE SERPL-MCNC: 112 MG/DL — HIGH (ref 70–99)
GLUCOSE SERPL-MCNC: 121 MG/DL — HIGH (ref 70–99)
HCT VFR BLD CALC: 36.3 % — SIGNIFICANT CHANGE UP (ref 34.5–45)
HGB BLD-MCNC: 12.1 G/DL — SIGNIFICANT CHANGE UP (ref 11.5–15.5)
INR BLD: 0.97 RATIO — SIGNIFICANT CHANGE UP (ref 0.88–1.16)
LYMPHOCYTES # BLD AUTO: 1.1 K/UL — SIGNIFICANT CHANGE UP (ref 1–3.3)
LYMPHOCYTES # BLD AUTO: 14.8 % — SIGNIFICANT CHANGE UP (ref 13–44)
MCHC RBC-ENTMCNC: 27.1 PG — SIGNIFICANT CHANGE UP (ref 27–34)
MCHC RBC-ENTMCNC: 33.3 GM/DL — SIGNIFICANT CHANGE UP (ref 32–36)
MCV RBC AUTO: 81.4 FL — SIGNIFICANT CHANGE UP (ref 80–100)
MONOCYTES # BLD AUTO: 0.6 K/UL — SIGNIFICANT CHANGE UP (ref 0–0.9)
MONOCYTES NFR BLD AUTO: 7.5 % — SIGNIFICANT CHANGE UP (ref 2–14)
NEUTROPHILS # BLD AUTO: 5.7 K/UL — SIGNIFICANT CHANGE UP (ref 1.8–7.4)
NEUTROPHILS NFR BLD AUTO: 76 % — SIGNIFICANT CHANGE UP (ref 43–77)
PLATELET # BLD AUTO: 302 K/UL — SIGNIFICANT CHANGE UP (ref 150–400)
POTASSIUM SERPL-MCNC: 3.5 MMOL/L — SIGNIFICANT CHANGE UP (ref 3.5–5.3)
POTASSIUM SERPL-MCNC: 3.8 MMOL/L — SIGNIFICANT CHANGE UP (ref 3.5–5.3)
POTASSIUM SERPL-SCNC: 3.5 MMOL/L — SIGNIFICANT CHANGE UP (ref 3.5–5.3)
POTASSIUM SERPL-SCNC: 3.8 MMOL/L — SIGNIFICANT CHANGE UP (ref 3.5–5.3)
PROT SERPL-MCNC: 7.1 GM/DL — SIGNIFICANT CHANGE UP (ref 6–8.3)
PROTHROM AB SERPL-ACNC: 10.5 SEC — SIGNIFICANT CHANGE UP (ref 9.8–12.7)
RBC # BLD: 4.46 M/UL — SIGNIFICANT CHANGE UP (ref 3.8–5.2)
RBC # FLD: 14 % — SIGNIFICANT CHANGE UP (ref 10.3–14.5)
SODIUM SERPL-SCNC: 143 MMOL/L — SIGNIFICANT CHANGE UP (ref 135–145)
SODIUM SERPL-SCNC: 144 MMOL/L — SIGNIFICANT CHANGE UP (ref 135–145)
TROPONIN I SERPL-MCNC: <0.015 NG/ML — SIGNIFICANT CHANGE UP (ref 0.01–0.04)
WBC # BLD: 7.5 K/UL — SIGNIFICANT CHANGE UP (ref 3.8–10.5)
WBC # FLD AUTO: 7.5 K/UL — SIGNIFICANT CHANGE UP (ref 3.8–10.5)

## 2017-11-01 PROCEDURE — 93010 ELECTROCARDIOGRAM REPORT: CPT

## 2017-11-01 PROCEDURE — 99285 EMERGENCY DEPT VISIT HI MDM: CPT

## 2017-11-01 PROCEDURE — 70450 CT HEAD/BRAIN W/O DYE: CPT | Mod: 26

## 2017-11-01 RX ORDER — ASCORBIC ACID 60 MG
1 TABLET,CHEWABLE ORAL
Qty: 0 | Refills: 0 | COMMUNITY

## 2017-11-01 RX ORDER — HYDROXYCHLOROQUINE SULFATE 200 MG
200 TABLET ORAL DAILY
Qty: 0 | Refills: 0 | Status: DISCONTINUED | OUTPATIENT
Start: 2017-11-01 | End: 2017-11-03

## 2017-11-01 RX ORDER — AMLODIPINE BESYLATE 2.5 MG/1
5 TABLET ORAL DAILY
Qty: 0 | Refills: 0 | Status: DISCONTINUED | OUTPATIENT
Start: 2017-11-01 | End: 2017-11-03

## 2017-11-01 RX ORDER — ACETAMINOPHEN 500 MG
650 TABLET ORAL EVERY 6 HOURS
Qty: 0 | Refills: 0 | Status: DISCONTINUED | OUTPATIENT
Start: 2017-11-01 | End: 2017-11-03

## 2017-11-01 RX ORDER — CHOLECALCIFEROL (VITAMIN D3) 125 MCG
2000 CAPSULE ORAL DAILY
Qty: 0 | Refills: 0 | Status: DISCONTINUED | OUTPATIENT
Start: 2017-11-01 | End: 2017-11-03

## 2017-11-01 RX ORDER — VITAMIN E 100 UNIT
1 CAPSULE ORAL
Qty: 0 | Refills: 0 | COMMUNITY

## 2017-11-01 RX ORDER — ATORVASTATIN CALCIUM 80 MG/1
40 TABLET, FILM COATED ORAL AT BEDTIME
Qty: 0 | Refills: 0 | Status: DISCONTINUED | OUTPATIENT
Start: 2017-11-01 | End: 2017-11-03

## 2017-11-01 RX ORDER — ASPIRIN/CALCIUM CARB/MAGNESIUM 324 MG
81 TABLET ORAL DAILY
Qty: 0 | Refills: 0 | Status: DISCONTINUED | OUTPATIENT
Start: 2017-11-01 | End: 2017-11-03

## 2017-11-01 RX ORDER — OMEGA-3 ACID ETHYL ESTERS 1 G
1 CAPSULE ORAL
Qty: 0 | Refills: 0 | COMMUNITY

## 2017-11-01 RX ORDER — ASPIRIN/CALCIUM CARB/MAGNESIUM 324 MG
325 TABLET ORAL ONCE
Qty: 0 | Refills: 0 | Status: COMPLETED | OUTPATIENT
Start: 2017-11-01 | End: 2017-11-01

## 2017-11-01 RX ORDER — ENOXAPARIN SODIUM 100 MG/ML
40 INJECTION SUBCUTANEOUS EVERY 24 HOURS
Qty: 0 | Refills: 0 | Status: DISCONTINUED | OUTPATIENT
Start: 2017-11-01 | End: 2017-11-03

## 2017-11-01 RX ORDER — GABAPENTIN 400 MG/1
100 CAPSULE ORAL DAILY
Qty: 0 | Refills: 0 | Status: DISCONTINUED | OUTPATIENT
Start: 2017-11-01 | End: 2017-11-03

## 2017-11-01 RX ADMIN — ENOXAPARIN SODIUM 40 MILLIGRAM(S): 100 INJECTION SUBCUTANEOUS at 23:19

## 2017-11-01 RX ADMIN — ATORVASTATIN CALCIUM 40 MILLIGRAM(S): 80 TABLET, FILM COATED ORAL at 23:19

## 2017-11-01 RX ADMIN — Medication 325 MILLIGRAM(S): at 18:02

## 2017-11-01 NOTE — ED ADULT TRIAGE NOTE - CHIEF COMPLAINT QUOTE
left side weakness started yesterday morning. pt woke up yesterday heaving problem walking left arm and left leg weakmess

## 2017-11-01 NOTE — ED ADULT NURSE REASSESSMENT NOTE - NS ED NURSE REASSESS COMMENT FT1
Dr. Ruiz made aware of hypertension. pt asymptomatic. No new orders at this time. will continue to monitor.

## 2017-11-01 NOTE — ED STATDOCS - PROGRESS NOTE DETAILS
Larry SP: 73 y/o female with hx of HTN, RA, dizziness presents to the ED c/o left sided weakness starting a day and a half ago when she woke up. Pt saw her doctor before presenting to the ED who told her to present to the ED for evaluation. Pt can't lift her left arm and feels weakness in her left leg while working. Pt had a left knee replacement on 08/04. Denies NVD, fever. Will send pt to main ED for further evaluation.

## 2017-11-01 NOTE — H&P ADULT - NSHPPHYSICALEXAM_GEN_ALL_CORE
Vital Signs Last 24 Hrs  T(C): 36.7 (01 Nov 2017 19:19), Max: 36.7 (01 Nov 2017 19:19)  T(F): 98 (01 Nov 2017 19:19), Max: 98 (01 Nov 2017 19:19)  HR: 82 (01 Nov 2017 19:19) (82 - 89)  BP: 169/106 (01 Nov 2017 19:19) (142/70 - 169/106)  RR: 18 (01 Nov 2017 19:19) (17 - 18)  SpO2: 97% (01 Nov 2017 19:19) (97% - 97%)

## 2017-11-01 NOTE — H&P ADULT - HISTORY OF PRESENT ILLNESS
73 yo female with PMH of RA on chronic prednisone and HTN presents with left sided weakness. Pt states she woke up around 7AM yesterday and noted to have left upper and lower extremity weakness. She though weakness was due to a pinched nerve in her shoulder. She continued to have this weakness and went to see her PMD today who recommended her to come to ED for further evaluation. She states her weakness is slightly improved but does have trouble ambulating. No facial droop, headaches, changes in vision, dizziness, chest pain, palpitations, SOB, abd pain, N/V, diarrhea.     NIHSS in ED =2. Ct head negative for acute infarct.

## 2017-11-01 NOTE — ED PROVIDER NOTE - OBJECTIVE STATEMENT
73 y/o female with PMHx of HTN, Rheumatoid Arthritis, dizziness and PSHx of B/L knee replacement presents to the ED c/o left sided weakness starting a day and a half ago when she woke up. Pt saw her doctor before presenting to the ED who told her to present to the ED for evaluation. Pt can't lift her left arm and feels weakness in her left leg while working. Pt had a left knee replacement on 08/04. Denies NVD, fever, or pain. No changes in vision, no difficulties eating or drinking. Pt is able to ambulate with some difficulty. Currently takes Placinol, Prednisone, Amlodipine. Former smoker (40 years ago). PMD Dr. Manuel Sexton.

## 2017-11-01 NOTE — ED PROVIDER NOTE - PROGRESS NOTE DETAILS
S/w Dr Maki of Neurology, will see pt in am. Will admit for further workup of L hemiplegia and CVA workup

## 2017-11-01 NOTE — ED ADULT NURSE REASSESSMENT NOTE - NS ED NURSE REASSESS COMMENT FT1
pt passed dysphagia screening and Dr. Douglas stated ok to give regular diet. Awaiting test results. Will continue to monitor.

## 2017-11-02 LAB
BASOPHILS # BLD AUTO: 0.1 K/UL — SIGNIFICANT CHANGE UP (ref 0–0.2)
BASOPHILS NFR BLD AUTO: 0.9 % — SIGNIFICANT CHANGE UP (ref 0–2)
CHOLEST SERPL-MCNC: 215 MG/DL — HIGH (ref 10–199)
EOSINOPHIL # BLD AUTO: 0.2 K/UL — SIGNIFICANT CHANGE UP (ref 0–0.5)
EOSINOPHIL NFR BLD AUTO: 3 % — SIGNIFICANT CHANGE UP (ref 0–6)
HBA1C BLD-MCNC: 5.8 % — HIGH (ref 4–5.6)
HCT VFR BLD CALC: 35.9 % — SIGNIFICANT CHANGE UP (ref 34.5–45)
HDLC SERPL-MCNC: 56 MG/DL — SIGNIFICANT CHANGE UP (ref 40–125)
HGB BLD-MCNC: 11.6 G/DL — SIGNIFICANT CHANGE UP (ref 11.5–15.5)
LIPID PNL WITH DIRECT LDL SERPL: 113 MG/DL — SIGNIFICANT CHANGE UP
LYMPHOCYTES # BLD AUTO: 2.2 K/UL — SIGNIFICANT CHANGE UP (ref 1–3.3)
LYMPHOCYTES # BLD AUTO: 32.9 % — SIGNIFICANT CHANGE UP (ref 13–44)
MCHC RBC-ENTMCNC: 26.8 PG — LOW (ref 27–34)
MCHC RBC-ENTMCNC: 32.4 GM/DL — SIGNIFICANT CHANGE UP (ref 32–36)
MCV RBC AUTO: 82.6 FL — SIGNIFICANT CHANGE UP (ref 80–100)
MONOCYTES # BLD AUTO: 0.5 K/UL — SIGNIFICANT CHANGE UP (ref 0–0.9)
MONOCYTES NFR BLD AUTO: 7.3 % — SIGNIFICANT CHANGE UP (ref 2–14)
NEUTROPHILS # BLD AUTO: 3.8 K/UL — SIGNIFICANT CHANGE UP (ref 1.8–7.4)
NEUTROPHILS NFR BLD AUTO: 56 % — SIGNIFICANT CHANGE UP (ref 43–77)
PLATELET # BLD AUTO: 256 K/UL — SIGNIFICANT CHANGE UP (ref 150–400)
RBC # BLD: 4.34 M/UL — SIGNIFICANT CHANGE UP (ref 3.8–5.2)
RBC # FLD: 14.2 % — SIGNIFICANT CHANGE UP (ref 10.3–14.5)
TOTAL CHOLESTEROL/HDL RATIO MEASUREMENT: 3.8 RATIO — SIGNIFICANT CHANGE UP (ref 3.3–7.1)
TRIGL SERPL-MCNC: 231 MG/DL — HIGH (ref 10–149)
TSH SERPL-MCNC: 3.62 UU/ML — SIGNIFICANT CHANGE UP (ref 0.36–3.74)
WBC # BLD: 6.8 K/UL — SIGNIFICANT CHANGE UP (ref 3.8–10.5)
WBC # FLD AUTO: 6.8 K/UL — SIGNIFICANT CHANGE UP (ref 3.8–10.5)

## 2017-11-02 PROCEDURE — 93306 TTE W/DOPPLER COMPLETE: CPT | Mod: 26

## 2017-11-02 PROCEDURE — 99223 1ST HOSP IP/OBS HIGH 75: CPT

## 2017-11-02 PROCEDURE — 70551 MRI BRAIN STEM W/O DYE: CPT | Mod: 26

## 2017-11-02 PROCEDURE — 70547 MR ANGIOGRAPHY NECK W/O DYE: CPT | Mod: 26

## 2017-11-02 RX ADMIN — Medication 5 MILLIGRAM(S): at 08:30

## 2017-11-02 RX ADMIN — Medication 250 MILLIGRAM(S): at 08:29

## 2017-11-02 RX ADMIN — GABAPENTIN 100 MILLIGRAM(S): 400 CAPSULE ORAL at 12:21

## 2017-11-02 RX ADMIN — ENOXAPARIN SODIUM 40 MILLIGRAM(S): 100 INJECTION SUBCUTANEOUS at 22:18

## 2017-11-02 RX ADMIN — Medication 81 MILLIGRAM(S): at 12:21

## 2017-11-02 RX ADMIN — Medication 250 MILLIGRAM(S): at 18:38

## 2017-11-02 RX ADMIN — ATORVASTATIN CALCIUM 40 MILLIGRAM(S): 80 TABLET, FILM COATED ORAL at 22:18

## 2017-11-02 RX ADMIN — AMLODIPINE BESYLATE 5 MILLIGRAM(S): 2.5 TABLET ORAL at 06:24

## 2017-11-02 RX ADMIN — Medication 200 MILLIGRAM(S): at 12:21

## 2017-11-02 RX ADMIN — Medication 2000 UNIT(S): at 12:21

## 2017-11-02 NOTE — PHYSICAL THERAPY INITIAL EVALUATION ADULT - PERTINENT HX OF CURRENT PROBLEM, REHAB EVAL
Pt. presents to HH rom PCP w/ left upper and lower extremity weakness and difficulty ambulating. NIHSS in ED: 2. CTH: (-)

## 2017-11-02 NOTE — SWALLOW BEDSIDE ASSESSMENT ADULT - SWALLOW EVAL: RECOMMENDED DIET
SUGGEST A REGULAR TEXTURE DIET WITH THIN LIQUID CONSISTENCIES AS PATIENT APPEARS CLINICALLY TOLERANT OF THESE FOOD TEXTURES FROM AN OROPHARYNGEAL SWALLOWING PERSPECTIVE.

## 2017-11-02 NOTE — CONSULT NOTE ADULT - SUBJECTIVE AND OBJECTIVE BOX
Patient is a 72y old  Female who presents with a chief complaint of left sided weakness started on Tuesday and persisted through yesterday ,saw PCP and rec to go to ED    HPI:  73 yo female with PMH of RA on chronic prednisone and HTN presents with left sided weakness. Pt states left side weakness started on Tuesday she woke up around 7AM yesterday and noted to have left upper and lower extremity weakness. She though weakness was due to a pinched nerve in her shoulder. She continued to have this weakness and went to see her PMD  who recommended her to come to ED for further evaluation. She states her weakness is slightly improved but does have trouble ambulating. No facial droop, headaches, changes in vision, dizziness, chest pain, palpitations, SOB, abd pain, N/V, diarrhea. H/o RA under care of  supposed to start Methotrexate. H/o Bilat Knee surgeries and H/o fall and fx Pelvis recently. Under care of     NIHSS in ED =2. Ct head negative for acute infarct. 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  H/O cataract extraction, right: 1/2015      FAMILY HISTORY:  Family history of heart disease (Mother)  Family history of lung cancer (Father)      Social Hx:  Nonsmoker, no drug or alcohol use    MEDICATIONS  (STANDING):  amLODIPine   Tablet 5 milliGRAM(s) Oral daily  aspirin  chewable 81 milliGRAM(s) Oral daily  atorvastatin 40 milliGRAM(s) Oral at bedtime  cholecalciferol 2000 Unit(s) Oral daily  enoxaparin Injectable 40 milliGRAM(s) SubCutaneous every 24 hours  gabapentin 100 milliGRAM(s) Oral daily  hydroxychloroquine 200 milliGRAM(s) Oral daily  naproxen 250 milliGRAM(s) Oral two times a day  predniSONE   Tablet 5 milliGRAM(s) Oral daily       Allergies    No Known Allergies    ROS: Pertinent positives in HPI, all other ROS were reviewed and are negative.      Vital Signs Last 24 Hrs  T(C): 36.5 (02 Nov 2017 10:33), Max: 37.2 (01 Nov 2017 23:30)  T(F): 97.7 (02 Nov 2017 10:33), Max: 98.9 (01 Nov 2017 23:30)  HR: 79 (02 Nov 2017 10:33) (75 - 89)  BP: 150/80 (02 Nov 2017 10:33) (140/90 - 169/106)  BP(mean): --  RR: 17 (02 Nov 2017 10:33) (16 - 18)  SpO2: 100% (02 Nov 2017 10:33) (96% - 100%)        Constitutional: awake and alert.  HEENT: PERRLA, EOMI,   Neck: Supple.  Respiratory: Breath sounds are clear bilaterally  Cardiovascular: S1 and S2, regular  rhythm  Gastrointestinal: soft, nontender  Extremities:  no edema  Vascular:  Carotid Bruit - no  Musculoskeletal: no joint swelling but bilat knee pains and left shoulder pain  Skin: No rashes    Neurological exam:  HF: A x O x 3. Appropriately interactive, normal affect. Speech fluent, No Aphasia or paraphasic errors. Naming /repetition intact   CN: MARCK, EOMI, VFF, facial sensation normal, no NLFD, tongue midline, Palate moves equally, SCM equal bilaterally  Motor:  left pronator drift with LEFT UE 3/5 left LE 4-5/5   Rt UE/LE 5/5  Sens: Intact to light touch / PP/ VS/ JS wxcept left distal LE decreased.   Reflexes: Symmetric and normal . BJ 2+, BR 2+, KJ 2+, AJ 1+ slightly brisk on left UE downgoing toes  RT Up going on left  Coord:  No dysmetria, KELSIE intact on Rt decreased on left  Gait/Balance: Cannot test    NIHSS: 4 not a TPA candidate as onset of symptoms out of TPA window      Labs:   11-01    143  |  109<H>  |  16  ----------------------------<  121<H>  3.5   |  30  |  0.82    Ca    8.5      01 Nov 2017 22:18    TPro  7.1  /  Alb  3.5  /  TBili  0.3  /  DBili  x   /  AST  16  /  ALT  23  /  AlkPhos  114  11-01      11-02 [id number] 5.8                          11.6   6.8   )-----------( 256      ( 02 Nov 2017 06:15 )             35.9       Radiology:  - CT Head:11/1/17      IMPRESSION:   advanced periventricular and deep white matter ischemia.

## 2017-11-02 NOTE — SWALLOW BEDSIDE ASSESSMENT ADULT - SWALLOW EVAL: REHAB POTENTIAL
Acute speech pathology intervention is not warranted. No Dysphagia process or speech-language pathology were evident. Thus, WILL NOT ACTIVELY FOLLOW. reconsult PRN as needed.

## 2017-11-02 NOTE — PROGRESS NOTE ADULT - ATTENDING COMMENTS
Pt. seen and examined with SALAZAR Robison. Agree with assessment and plan as above. Changes made where appropriate. Pt. seen and examined with SALAZAR Robison. Agree with assessment and plan as above. Changes made where appropriate.  - neuro eval appreacited and will f/u mri/mra  - pt eval ntoed  -s/s eval requested

## 2017-11-02 NOTE — SWALLOW BEDSIDE ASSESSMENT ADULT - DIET PRIOR TO ADMI
On regular texture diet with thin liquids PTH. The pt was reportedly on regular texture diet with thin liquids PTH.

## 2017-11-02 NOTE — SWALLOW BEDSIDE ASSESSMENT ADULT - COMMENTS
Pt admitted to  with gait difficulties and left sided weakness. Stroke w/u in process. This profile is superimposed upon a h/o a recent fall with a pelvic fracture. Other past medical history is as stated below.

## 2017-11-02 NOTE — PROGRESS NOTE ADULT - SUBJECTIVE AND OBJECTIVE BOX
73 y/o female with PMHx of RA on chronic prednisone and HTN presents with left sided weakness. Pt states she woke up around 7AM yesterday and noted to have left upper and lower extremity weakness. She though weakness was due to a pinched nerve in her shoulder. She continued to have this weakness and went to see her PMD today who recommended her to come to ED for further evaluation. She states her weakness is slightly improved but does have trouble ambulating. No facial droop, headaches, changes in vision, dizziness, chest pain, palpitations, SOB, abd pain, N/V, diarrhea. NIHSS in ED =2. Ct head negative for acute infarct.    11/2 - Pt. seen and examined in bed, feels well. States the left sided weakness is improved.     REVIEW OF SYSTEMS: all negative except for comments above.         Physical Exam:   Vital Signs Last 24 Hrs T(C): 36.5 (02 Nov 2017 10:33), Max: 37.2 (01 Nov 2017 23:30) T(F): 97.7 (02 Nov 2017 10:33), Max: 98.9 (01 Nov 2017 23:30) HR: 79 (02 Nov 2017 10:33) (75 - 89) BP: 150/80 (02 Nov 2017 10:33) (140/90 - 169/106) BP(mean): -- RR: 17 (02 Nov 2017 10:33) (16 - 18) SpO2: 100% (02 Nov 2017 10:33) (96% - 100%)  	      PHYSICAL EXAM:    Constitutional: NAD, well-nourished, well-developed  Neck: Soft and supple  Respiratory: Breath sounds are clear bilaterally  Cardiovascular: S1 and S2, regular rate and rhythm  Gastrointestinal: Bowel Sounds present, soft, nontender, nondistended  Extremities: No peripheral edema  Neurological: A/O x 3, no focal deficits  Skin: No rashes          Laboratory:                           11.6   6.8   )-----------( 256      ( 02 Nov 2017 06:15 )             35.9     11-01    143  |  109<H>  |  16  ----------------------------<  121<H>  3.5   |  30  |  0.82    Ca    8.5      01 Nov 2017 22:18    TPro  7.1  /  Alb  3.5  /  TBili  0.3  /  DBili  x   /  AST  16  /  ALT  23  /  AlkPhos  114  11-01      PT/INR - ( 01 Nov 2017 17:23 )   PT: 10.5 sec;   INR: 0.97 ratio         PTT - ( 01 Nov 2017 17:23 )  PTT:28.3 sec        MEDICATIONS  (STANDING):  amLODIPine   Tablet 5 milliGRAM(s) Oral daily  aspirin  chewable 81 milliGRAM(s) Oral daily  atorvastatin 40 milliGRAM(s) Oral at bedtime  cholecalciferol 2000 Unit(s) Oral daily  enoxaparin Injectable 40 milliGRAM(s) SubCutaneous every 24 hours  gabapentin 100 milliGRAM(s) Oral daily  hydroxychloroquine 200 milliGRAM(s) Oral daily  naproxen 250 milliGRAM(s) Oral two times a day  predniSONE   Tablet 5 milliGRAM(s) Oral daily    MEDICATIONS  (PRN):  acetaminophen   Tablet. 650 milliGRAM(s) Oral every 6 hours PRN Mild Pain (1 - 3)        Assessment and Plan:     73 y/o female with PMHx as above:    # Left sided weakness - r/o CVA  - admit to tele  - NIHSS in ED =2  - CT head negative for acute infarct  - MRI/MRA head and neck pending  - neuro consult appreciated  - f/u echo results  - c/w asa, statin  - neuro checks  - PT consult  - s and s eval appreciated    # HTN  - continue amlodipine    # RA  - continue prednisone, neurontin and plaquenil    # DVT prophylaxis  - lovenox 71 y/o female with PMHx of RA on chronic prednisone and HTN presents with left sided weakness. Pt states she woke up around 7AM yesterday and noted to have left upper and lower extremity weakness. She though weakness was due to a pinched nerve in her shoulder. She continued to have this weakness and went to see her PMD today who recommended her to come to ED for further evaluation. She states her weakness is slightly improved but does have trouble ambulating. No facial droop, headaches, changes in vision, dizziness, chest pain, palpitations, SOB, abd pain, N/V, diarrhea. NIHSS in ED =2. Ct head negative for acute infarct.    11/2 - Pt. seen and examined in bed, feels well. States the left sided weakness is improved.     REVIEW OF SYSTEMS: all negative except for comments above.         Physical Exam:   ICU Vital Signs Last 24 Hrs T(C): 36.5 (02 Nov 2017 10:33), Max: 37.2 (01 Nov 2017 23:30) T(F): 97.7 (02 Nov 2017 10:33), Max: 98.9 (01 Nov 2017 23:30) HR: 79 (02 Nov 2017 10:33) (75 - 89) BP: 150/80 (02 Nov 2017 10:33) (140/90 - 169/106) BP(mean): -- ABP: -- ABP(mean): -- RR: 17 (02 Nov 2017 10:33) (16 - 18) SpO2: 100% (02 Nov 2017 10:33) (96% - 100%)   	      PHYSICAL EXAM:    Constitutional: NAD, well-nourished, well-developed  Neck: Soft and supple  Respiratory: Breath sounds are clear bilaterally  Cardiovascular: S1 and S2, regular rate and rhythm  Gastrointestinal: Bowel Sounds present, soft, nontender, nondistended  Extremities: No peripheral edema  Neurological: A/O x 3, no focal deficits  Skin: No rashes          Laboratory:                           11.6   6.8   )-----------( 256      ( 02 Nov 2017 06:15 )             35.9     11-01    143  |  109<H>  |  16  ----------------------------<  121<H>  3.5   |  30  |  0.82    Ca    8.5      01 Nov 2017 22:18    TPro  7.1  /  Alb  3.5  /  TBili  0.3  /  DBili  x   /  AST  16  /  ALT  23  /  AlkPhos  114  11-01      PT/INR - ( 01 Nov 2017 17:23 )   PT: 10.5 sec;   INR: 0.97 ratio         PTT - ( 01 Nov 2017 17:23 )  PTT:28.3 sec        MEDICATIONS  (STANDING):  amLODIPine   Tablet 5 milliGRAM(s) Oral daily  aspirin  chewable 81 milliGRAM(s) Oral daily  atorvastatin 40 milliGRAM(s) Oral at bedtime  cholecalciferol 2000 Unit(s) Oral daily  enoxaparin Injectable 40 milliGRAM(s) SubCutaneous every 24 hours  gabapentin 100 milliGRAM(s) Oral daily  hydroxychloroquine 200 milliGRAM(s) Oral daily  naproxen 250 milliGRAM(s) Oral two times a day  predniSONE   Tablet 5 milliGRAM(s) Oral daily    MEDICATIONS  (PRN):  acetaminophen   Tablet. 650 milliGRAM(s) Oral every 6 hours PRN Mild Pain (1 - 3)        Assessment and Plan:     71 y/o female with PMHx as above:    # Left sided weakness - r/o CVA  - admit to tele  - NIHSS in ED =2  - CT head negative for acute infarct  - MRI/MRA head and neck pending  - neuro consult appreciated  - f/u echo results  - c/w asa, statin  - neuro checks  - PT consult  - s and s eval appreciated    # HTN  - continue amlodipine    # RA  - continue prednisone, neurontin and plaquenil  - outpt f/u with her rheum     # DVT prophylaxis  - lovenox

## 2017-11-02 NOTE — SWALLOW BEDSIDE ASSESSMENT ADULT - ORAL PHASE
Within functional limits/Bolus formation/transfer were mechanically functional and no oral pooling was evident.

## 2017-11-02 NOTE — SWALLOW BEDSIDE ASSESSMENT ADULT - ASR SWALLOW RECOMMEND DIAG
Defer MBS as pt appeared clinically tolerant of suggested PO textures from an oropharyngeal swallowing perspective.

## 2017-11-02 NOTE — SWALLOW BEDSIDE ASSESSMENT ADULT - PHARYNGEAL PHASE
Swallow trigger was timely with sufficient laryngeal lift on palpation during swallow trials without overt aspiration signs. Odynophagia was denied,/Within functional limits

## 2017-11-02 NOTE — SWALLOW BEDSIDE ASSESSMENT ADULT - SWALLOW EVAL: DIAGNOSIS
1) Patient demonstrates Oropharyngeal Swallowing which subjectively appears to be stable and within functional parameters for age. No behavioral aspiration signs were exhibited on exam.  2) Pt is communicatively competent and able to verbalize intents without a primary speech-language pathology in conversation. No dysarthria, verbal apraxia or aphasia were evident on exam. At reported communicative baseline.

## 2017-11-02 NOTE — CHART NOTE - NSCHARTNOTEFT_GEN_A_CORE
Neuro Radiologist Called in regards to Critical Finding on MRI - acute, subacute lacunar infarct R frontoparietal junction.     called Dr. Beck who is following the patient and explained finding. After discussion, Dr. Beck recommended no immediate intervention at the moment. Pt is currently on aspirin, enoxaparin and atorvastatin 40mg.      Plan:   -follow up with hypercoaguable pannel   -follow up with cardiology consult in AM  - Dr. Beck will follow up with pt in AM        - case discussed with Dr North PGY-3

## 2017-11-02 NOTE — SWALLOW BEDSIDE ASSESSMENT ADULT - SLP GENERAL OBSERVATIONS
Pt was alert and interactive. She is communicatively competent and able to verbalize intents without a primary speech-language pathology in conversation. No dysarthria, verbal apraxia or aphasia were evident on exam. At reported communicative baseline.. Pt denied Dysphagia when asked. Pt was alert and interactive. She is communicatively competent and able to verbalize intents without a primary speech-language pathology in conversation. No dysarthria, verbal apraxia or aphasia were evident on exam. At reported communicative baseline. Pt denied Dysphagia when asked.

## 2017-11-02 NOTE — PHYSICAL THERAPY INITIAL EVALUATION ADULT - ACTIVE RANGE OF MOTION EXAMINATION, REHAB EVAL
deficits as listed below/bilateral  lower extremity Active ROM was WFL (within functional limits)/limited L active shoulder flexion. Pt. reports pt. has had chronic L shoulder ROM deficits due to RA./bilateral upper extremity Active ROM was WFL (within functional limits)

## 2017-11-02 NOTE — PHYSICAL THERAPY INITIAL EVALUATION ADULT - ADDITIONAL COMMENTS
Pt. reports residing in house w/ 1 BARON w/ HR w/ . PTA, pt. ambulated w/out an AD, although admits performing furniture walking. Pt. has been attending outpatient PT for L knee for TKA in August as per pt. and sustained a recent pelvic fracture in September after falling.

## 2017-11-02 NOTE — CONSULT NOTE ADULT - ASSESSMENT
73 yo female with PMH of RA on chronic prednisone and HTN presents with left sided weakness for 2 days with increasing symptoms yesterday noted to have left upper and lower extremity weakness. and went to see her PMD who recommended her to come to ED for further evaluation.  R/o CVA Rt Ischemic/Embolic .  Agree with Telemetry monitor.  Correct undelying metabolic causes.  MRI/MRA brain and Carotids pending.  cardiology consul;t  TTE/JOSE DANIEL depending on results.  ASA/statin.  Hypercoag w/u  PT/OT.  Swallow consult.  will f/u.  Discussed with Pt.

## 2017-11-03 ENCOUNTER — TRANSCRIPTION ENCOUNTER (OUTPATIENT)
Age: 72
End: 2017-11-03

## 2017-11-03 VITALS
SYSTOLIC BLOOD PRESSURE: 136 MMHG | RESPIRATION RATE: 18 BRPM | OXYGEN SATURATION: 95 % | HEART RATE: 84 BPM | DIASTOLIC BLOOD PRESSURE: 62 MMHG

## 2017-11-03 LAB
AT III ACT/NOR PPP CHRO: 109 % — SIGNIFICANT CHANGE UP (ref 85–135)
DRVVT SCREEN TO CONFIRM RATIO: SIGNIFICANT CHANGE UP
HCYS SERPL-MCNC: 8.6 UMOL/L — SIGNIFICANT CHANGE UP (ref 5–20)
LA NT DPL PPP QL: 28.7 SEC — SIGNIFICANT CHANGE UP
PROT C ACT/NOR PPP: 132 % — SIGNIFICANT CHANGE UP (ref 74–150)
PROT S FREE AG PPP IA-ACNC: 166 % — HIGH (ref 61–131)

## 2017-11-03 PROCEDURE — 99233 SBSQ HOSP IP/OBS HIGH 50: CPT

## 2017-11-03 RX ORDER — ASPIRIN/CALCIUM CARB/MAGNESIUM 324 MG
1 TABLET ORAL
Qty: 30 | Refills: 0 | OUTPATIENT
Start: 2017-11-03 | End: 2017-12-03

## 2017-11-03 RX ORDER — ATORVASTATIN CALCIUM 80 MG/1
1 TABLET, FILM COATED ORAL
Qty: 30 | Refills: 0 | OUTPATIENT
Start: 2017-11-03 | End: 2017-12-03

## 2017-11-03 RX ADMIN — Medication 2000 UNIT(S): at 11:33

## 2017-11-03 RX ADMIN — Medication 250 MILLIGRAM(S): at 05:19

## 2017-11-03 RX ADMIN — AMLODIPINE BESYLATE 5 MILLIGRAM(S): 2.5 TABLET ORAL at 05:19

## 2017-11-03 RX ADMIN — GABAPENTIN 100 MILLIGRAM(S): 400 CAPSULE ORAL at 11:33

## 2017-11-03 RX ADMIN — Medication 81 MILLIGRAM(S): at 11:33

## 2017-11-03 RX ADMIN — Medication 5 MILLIGRAM(S): at 05:19

## 2017-11-03 RX ADMIN — Medication 200 MILLIGRAM(S): at 11:33

## 2017-11-03 NOTE — DISCHARGE NOTE ADULT - CARE PLAN
Principal Discharge DX:	Unsteady gait  Goal:	To continue taking ASA 81mg, Lipitor 40mg PO  Instructions for follow-up, activity and diet:	- To follow up with Dr. Beck in 1-2 weeks  - To follow up with your PCP Dr. Sexton in 1 week and have hypercoagulability workup  - Physical therapy 3x week for 5 weeks for gait training and balance for acute stroke

## 2017-11-03 NOTE — DISCHARGE NOTE ADULT - HOSPITAL COURSE
73 y/o female with PMHx of RA on chronic prednisone and HTN presents with left sided weakness. Pt states she woke up around 7AM yesterday and noted to have left upper and lower extremity weakness. She though weakness was due to a pinched nerve in her shoulder. She continued to have this weakness and went to see her PMD today who recommended her to come to ED for further evaluation. She states her weakness is slightly improved but does have trouble ambulating. No facial droop, headaches, changes in vision, dizziness, chest pain, palpitations, SOB, abd pain, N/V, diarrhea. NIHSS in ED =2. Ct head negative for acute infarct.    Pt. admitted with Left sided weakness - r/o CVA. NIHSS in ED =2. Monitored on tele, no events. CT head negative for acute infarct. MRI/MRA head and neck showed right frontoparietal junction acute infarct. S&S completed Neuro consult appreciated. Patient will be discharged on ASA 81mg PO, Lipitor 40mg PO and follow up with Dr. Beck in 1-2 weeks.    Pt. instructed to follow up with her PCP Dr. Sexton for hypercoagulability workup as outpatient within 1 week. Pt. verbalized understanding.     PT recommended outpatient PT. Pt. already goes to PT for her knee replacements. Gave patient a RX for PT 3x/week for 5 weeks for gait and balance training.                Physical Exam:   Vital Signs Last 24 Hrs T(C): 36.5 (03 Nov 2017 10:12), Max: 37.1 (03 Nov 2017 02:00) T(F): 97.7 (03 Nov 2017 10:12), Max: 98.7 (03 Nov 2017 02:00) HR: 82 (03 Nov 2017 10:12) (75 - 82) BP: 132/82 (03 Nov 2017 10:12) (130/79 - 156/82) BP(mean): -- RR: 17 (03 Nov 2017 10:12) (16 - 18) SpO2: 97% (03 Nov 2017 10:12) (97% - 100%)   	      PHYSICAL EXAM:    Constitutional: NAD, well-nourished, well-developed  Neck: Soft and supple  Respiratory: Breath sounds are clear bilaterally  Cardiovascular: S1 and S2, regular rate and rhythm  Gastrointestinal: Bowel Sounds present, soft, nontender, nondistended  Extremities: No peripheral edema  Neurological: A/O x 3, no focal deficits  Skin: No rashes

## 2017-11-03 NOTE — DISCHARGE NOTE ADULT - PATIENT PORTAL LINK FT
“You can access the [facility] Patient Portal, offered by [facility], by registering with the following website: [email or web address]”

## 2017-11-03 NOTE — PROGRESS NOTE ADULT - SUBJECTIVE AND OBJECTIVE BOX
· Reason for Referral/Consultation:	R/o CVA with Left side weakness	      · Subjective and Objective: 	  Patient is a 72y old  Female who presents with a chief complaint of left sided weakness started on Tuesday and persisted through yesterday ,saw PCP and rec to go to ED    HPI:  71 yo female with PMH of RA on chronic prednisone and HTN presents with left sided weakness. Pt states left side weakness started on Tuesday she woke up around 7AM yesterday and noted to have left upper and lower extremity weakness. She though weakness was due to a pinched nerve in her shoulder. She continued to have this weakness and went to see her PMD  who recommended her to come to ED for further evaluation. She states her weakness is slightly improved but does have trouble ambulating. No facial droop, headaches, changes in vision, dizziness, chest pain, palpitations, SOB, abd pain, N/V, diarrhea. H/o RA under care of  supposed to start Methotrexate. H/o Bilat Knee surgeries and H/o fall and fx Pelvis recently. Under care of   NIHSS in ED =2. Ct head negative for acute infarct. (01 Nov 2017 19:54)    11/3/17: Pt feeling better left side weakness slightly improved in left UE able to lift higher and le able to ambulate with assistance, No new c/o.MRI scan done no official reports called last night n RAd report as acute/ subacute infarct in Rt fronto parietal junction and centrum semiovale. No mRa report. No Headache no other sx. Wants to go home.    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  H/O cataract extraction, right: 1/2015      FAMILY HISTORY:  Family history of heart disease (Mother)  Family history of lung cancer (Father)      Social Hx:  Nonsmoker, no drug or alcohol use    MEDICATIONS  (STANDING):  amLODIPine   Tablet 5 milliGRAM(s) Oral daily  aspirin  chewable 81 milliGRAM(s) Oral daily  atorvastatin 40 milliGRAM(s) Oral at bedtime  cholecalciferol 2000 Unit(s) Oral daily  enoxaparin Injectable 40 milliGRAM(s) SubCutaneous every 24 hours  gabapentin 100 milliGRAM(s) Oral daily  hydroxychloroquine 200 milliGRAM(s) Oral daily  naproxen 250 milliGRAM(s) Oral two times a day  predniSONE   Tablet 5 milliGRAM(s) Oral daily      Vital Signs Last 24 Hrs  T(C): 36.8 (03 Nov 2017 05:00), Max: 37.1 (03 Nov 2017 02:00)  T(F): 98.3 (03 Nov 2017 05:00), Max: 98.7 (03 Nov 2017 02:00)  HR: 75 (03 Nov 2017 05:00) (75 - 79)  BP: 137/90 (03 Nov 2017 05:00) (130/79 - 156/82)  BP(mean): --  RR: 16 (03 Nov 2017 05:00) (16 - 18)  SpO2: 97% (03 Nov 2017 05:00) (97% - 100%)    Constitutional: awake and alert.  HEENT: PERRLA, EOMI,   Neck: Supple.  Respiratory: Breath sounds are clear bilaterally  Cardiovascular: S1 and S2, regular  rhythm  Gastrointestinal: soft, nontender  Extremities:  no edema  Vascular:  Carotid Bruit - no  Musculoskeletal: no joint swelling but bilat knee pains and left shoulder pain  Skin: No rashes    Neurological exam:  HF: A x O x 3. Appropriately interactive, normal affect. Speech fluent, No Aphasia or paraphasic errors. Naming /repetition intact   CN: MARCK, EOMI, VFF, facial sensation normal, no NLFD, tongue midline, Palate moves equally, SCM equal bilaterally  Motor:  left pronator drift with LEFT UE 3/5 left LE 4-5/5   Rt UE/LE 5/5  Sens: Intact to light touch / PP/ VS/ JS wxcept left distal LE decreased.   Reflexes: Symmetric and normal . BJ 2+, BR 2+, KJ 2+, AJ 1+ slightly brisk on left UE downgoing toes  RT Up going on left   Coord:  No dysmetria, KELSIE intact on Rt decreased on left  Gait/Balance: Cannot test    NIHSS: 4 not a TPA candidate as onset of symptoms out of TPA window                       11.6   6.8   )-----------( 256      ( 02 Nov 2017 06:15 )             35.9     11-01    143  |  109<H>  |  16  ----------------------------<  121<H>  3.5   |  30  |  0.82    Ca    8.5      01 Nov 2017 22:18    TPro  7.1  /  Alb  3.5  /  TBili  0.3  /  DBili  x   /  AST  16  /  ALT  23  /  AlkPhos  114  11-01 11-02 [id number] 5.8    11-02 Chol 215<H>  HDL 56 Trig 231<H>    Radiology report:  - CT Head 11/1/17  IMPRESSION:   advanced periventricular and deep white matter ischemia.      MRI 11/2/17  Acute/Subacute infarct in RT fronto parietal junction and Centrum semi ovale. f/u with official report

## 2017-11-03 NOTE — DISCHARGE NOTE ADULT - MEDICATION SUMMARY - MEDICATIONS TO TAKE
I will START or STAY ON the medications listed below when I get home from the hospital:    predniSONE 5 mg oral tablet  -- 1 tab(s) by mouth once a day  -- Indication: For for RA    aspirin 81 mg oral tablet, chewable  -- 1 tab(s) by mouth once a day  -- Indication: For for your heart    gabapentin 100 mg oral capsule  -- 1 cap(s) by mouth once a day  -- Indication: For for RA    atorvastatin 40 mg oral tablet  -- 1 tab(s) by mouth once a day (at bedtime)  -- Indication: For for your cholesterol/lipids    hydroxychloroquine 200 mg oral tablet  -- 1 tab(s) by mouth once a day  -- Indication: For for RA    amLODIPine  -- 5 milligram(s) by mouth once a day  -- Indication: For for your heart    Voltaren 1% topical gel  -- Indication: For topical gel    Vitamin D3 2000 intl units oral capsule  -- 1 cap(s) by mouth once a day  -- Indication: For vitamin D

## 2017-11-03 NOTE — DISCHARGE NOTE ADULT - NS AS DC STROKE ED MATERIALS
Stroke Warning Signs and Symptoms/Call 911 for Stroke/Risk Factors for Stroke/Need for Followup After Discharge/Prescribed Medications/Stroke Education Booklet

## 2017-11-03 NOTE — DISCHARGE NOTE ADULT - MEDICATION SUMMARY - MEDICATIONS TO STOP TAKING
I will STOP taking the medications listed below when I get home from the hospital:    naproxen sodium 220 mg oral capsule  -- 1 cap(s) by mouth every 12 hours

## 2017-11-03 NOTE — DISCHARGE NOTE ADULT - PLAN OF CARE
To continue taking ASA 81mg, Lipitor 40mg PO - To follow up with Dr. Beck in 1-2 weeks  - To follow up with your PCP Dr. Sexton in 1 week and have hypercoagulability workup  - Physical therapy 3x week for 5 weeks for gait training and balance for acute stroke

## 2017-11-03 NOTE — DISCHARGE NOTE ADULT - CARE PROVIDERS DIRECT ADDRESSES
[email or web address]

## 2017-11-03 NOTE — PROGRESS NOTE ADULT - ASSESSMENT
Assessment and Recommendation:   · Assessment		  73 yo female with PMH of RA on chronic prednisone and HTN presents with left sided weakness for 2 days with increasing symptoms yesterday noted to have left upper and lower extremity weakness. and went to see her PMD who recommended her to come to ED for further evaluation.  R/o CVA Rt Ischemic/Embolic .  Agree with Telemetry monitor.  Correct undelying metabolic causes.  MRI/MRA brain and Carotids pending. acute /subacute infarct in Rt fronto parietal junction MRa no reports f/u with official reports.  cardiology consul;t pending  TTE no acute pathology.  JOSE DANIEL depending on results.  ASA/statin.  Hypercoag w/u pending  PT/OT.  Swallow consult. appreciated.  will f/u.  Discussed with Pt.

## 2017-11-03 NOTE — DISCHARGE NOTE ADULT - CARE PROVIDER_API CALL
Leydi Beck), Neurology  General  [address] [place] [address]  [address]  [place] [address]  Phone: [phone]  Fax: [phone]    Manuel Sexton), Internal Medicine  [address] [place] [address]  Phone: [phone]  Fax: [phone]

## 2017-11-04 LAB
B2 GLYCOPROT1 AB SER QL: NEGATIVE — SIGNIFICANT CHANGE UP
CARDIOLIPIN AB SER-ACNC: NEGATIVE — SIGNIFICANT CHANGE UP

## 2017-11-07 ENCOUNTER — APPOINTMENT (OUTPATIENT)
Dept: NEUROLOGY | Facility: CLINIC | Age: 72
End: 2017-11-07
Payer: MEDICARE

## 2017-11-07 VITALS
HEIGHT: 68 IN | SYSTOLIC BLOOD PRESSURE: 142 MMHG | DIASTOLIC BLOOD PRESSURE: 68 MMHG | HEART RATE: 84 BPM | BODY MASS INDEX: 21.67 KG/M2 | WEIGHT: 143 LBS

## 2017-11-07 VITALS — RESPIRATION RATE: 16 BRPM

## 2017-11-07 DIAGNOSIS — Z87.891 PERSONAL HISTORY OF NICOTINE DEPENDENCE: ICD-10-CM

## 2017-11-07 DIAGNOSIS — R29.702 NIHSS SCORE 2: ICD-10-CM

## 2017-11-07 DIAGNOSIS — I63.9 CEREBRAL INFARCTION, UNSPECIFIED: ICD-10-CM

## 2017-11-07 DIAGNOSIS — Z82.49 FAMILY HISTORY OF ISCHEMIC HEART DISEASE AND OTHER DISEASES OF THE CIRCULATORY SYSTEM: ICD-10-CM

## 2017-11-07 DIAGNOSIS — G81.94 HEMIPLEGIA, UNSPECIFIED AFFECTING LEFT NONDOMINANT SIDE: ICD-10-CM

## 2017-11-07 DIAGNOSIS — Z96.651 PRESENCE OF RIGHT ARTIFICIAL KNEE JOINT: ICD-10-CM

## 2017-11-07 DIAGNOSIS — Z96.652 PRESENCE OF LEFT ARTIFICIAL KNEE JOINT: ICD-10-CM

## 2017-11-07 DIAGNOSIS — I10 ESSENTIAL (PRIMARY) HYPERTENSION: ICD-10-CM

## 2017-11-07 DIAGNOSIS — M06.9 RHEUMATOID ARTHRITIS, UNSPECIFIED: ICD-10-CM

## 2017-11-07 DIAGNOSIS — Z86.79 PERSONAL HISTORY OF OTHER DISEASES OF THE CIRCULATORY SYSTEM: ICD-10-CM

## 2017-11-07 DIAGNOSIS — Z80.1 FAMILY HISTORY OF MALIGNANT NEOPLASM OF TRACHEA, BRONCHUS AND LUNG: ICD-10-CM

## 2017-11-07 PROCEDURE — 99215 OFFICE O/P EST HI 40 MIN: CPT

## 2017-11-07 RX ORDER — HYDROMORPHONE HYDROCHLORIDE 2 MG/1
2 TABLET ORAL
Qty: 60 | Refills: 0 | Status: COMPLETED | COMMUNITY
Start: 2017-08-11

## 2017-11-07 RX ORDER — DOCUSATE SODIUM 100 MG/1
100 CAPSULE, LIQUID FILLED ORAL
Qty: 14 | Refills: 0 | Status: COMPLETED | COMMUNITY
Start: 2017-08-07

## 2017-11-07 RX ORDER — DICLOFENAC SODIUM 10 MG/G
1 GEL TOPICAL
Qty: 100 | Refills: 0 | Status: ACTIVE | COMMUNITY
Start: 2017-06-12

## 2017-11-07 RX ORDER — AZITHROMYCIN 500 MG/1
500 TABLET, FILM COATED ORAL
Qty: 3 | Refills: 0 | Status: COMPLETED | COMMUNITY
Start: 2017-07-28

## 2017-11-07 RX ORDER — FLUCONAZOLE 100 MG/1
100 TABLET ORAL
Qty: 4 | Refills: 0 | Status: COMPLETED | COMMUNITY
Start: 2017-09-01

## 2017-11-29 ENCOUNTER — EMERGENCY (EMERGENCY)
Facility: HOSPITAL | Age: 72
LOS: 0 days | Discharge: HOME | End: 2017-11-29
Attending: EMERGENCY MEDICINE | Admitting: EMERGENCY MEDICINE
Payer: MEDICARE

## 2017-11-29 VITALS
DIASTOLIC BLOOD PRESSURE: 78 MMHG | RESPIRATION RATE: 18 BRPM | HEART RATE: 81 BPM | TEMPERATURE: 97 F | SYSTOLIC BLOOD PRESSURE: 128 MMHG | OXYGEN SATURATION: 100 %

## 2017-11-29 VITALS
RESPIRATION RATE: 18 BRPM | HEART RATE: 100 BPM | SYSTOLIC BLOOD PRESSURE: 125 MMHG | OXYGEN SATURATION: 100 % | WEIGHT: 151.9 LBS | HEIGHT: 68 IN | TEMPERATURE: 97 F | DIASTOLIC BLOOD PRESSURE: 70 MMHG

## 2017-11-29 DIAGNOSIS — R94.31 ABNORMAL ELECTROCARDIOGRAM [ECG] [EKG]: ICD-10-CM

## 2017-11-29 DIAGNOSIS — E78.5 HYPERLIPIDEMIA, UNSPECIFIED: ICD-10-CM

## 2017-11-29 DIAGNOSIS — Z98.890 OTHER SPECIFIED POSTPROCEDURAL STATES: Chronic | ICD-10-CM

## 2017-11-29 DIAGNOSIS — R07.9 CHEST PAIN, UNSPECIFIED: ICD-10-CM

## 2017-11-29 DIAGNOSIS — Z86.73 PERSONAL HISTORY OF TRANSIENT ISCHEMIC ATTACK (TIA), AND CEREBRAL INFARCTION WITHOUT RESIDUAL DEFICITS: ICD-10-CM

## 2017-11-29 DIAGNOSIS — Z96.651 PRESENCE OF RIGHT ARTIFICIAL KNEE JOINT: Chronic | ICD-10-CM

## 2017-11-29 DIAGNOSIS — T84.093A OTHER MECHANICAL COMPLICATION OF INTERNAL LEFT KNEE PROSTHESIS, INITIAL ENCOUNTER: Chronic | ICD-10-CM

## 2017-11-29 DIAGNOSIS — M06.9 RHEUMATOID ARTHRITIS, UNSPECIFIED: ICD-10-CM

## 2017-11-29 DIAGNOSIS — Z79.82 LONG TERM (CURRENT) USE OF ASPIRIN: ICD-10-CM

## 2017-11-29 DIAGNOSIS — I10 ESSENTIAL (PRIMARY) HYPERTENSION: ICD-10-CM

## 2017-11-29 LAB
ADD ON TEST-SPECIMEN IN LAB: SIGNIFICANT CHANGE UP
ALBUMIN SERPL ELPH-MCNC: 3.3 G/DL — SIGNIFICANT CHANGE UP (ref 3.3–5)
ALP SERPL-CCNC: 107 U/L — SIGNIFICANT CHANGE UP (ref 40–120)
ALT FLD-CCNC: 27 U/L — SIGNIFICANT CHANGE UP (ref 12–78)
ANION GAP SERPL CALC-SCNC: 7 MMOL/L — SIGNIFICANT CHANGE UP (ref 5–17)
APTT BLD: 28.3 SEC — SIGNIFICANT CHANGE UP (ref 27.5–37.4)
AST SERPL-CCNC: 20 U/L — SIGNIFICANT CHANGE UP (ref 15–37)
BASOPHILS # BLD AUTO: 0.1 K/UL — SIGNIFICANT CHANGE UP (ref 0–0.2)
BASOPHILS NFR BLD AUTO: 1.2 % — SIGNIFICANT CHANGE UP (ref 0–2)
BILIRUB SERPL-MCNC: 0.5 MG/DL — SIGNIFICANT CHANGE UP (ref 0.2–1.2)
BUN SERPL-MCNC: 11 MG/DL — SIGNIFICANT CHANGE UP (ref 7–23)
CALCIUM SERPL-MCNC: 8.9 MG/DL — SIGNIFICANT CHANGE UP (ref 8.5–10.1)
CHLORIDE SERPL-SCNC: 104 MMOL/L — SIGNIFICANT CHANGE UP (ref 96–108)
CO2 SERPL-SCNC: 30 MMOL/L — SIGNIFICANT CHANGE UP (ref 22–31)
CREAT SERPL-MCNC: 0.95 MG/DL — SIGNIFICANT CHANGE UP (ref 0.5–1.3)
D DIMER BLD IA.RAPID-MCNC: 891 NG/ML DDU — HIGH
EOSINOPHIL # BLD AUTO: 0.2 K/UL — SIGNIFICANT CHANGE UP (ref 0–0.5)
EOSINOPHIL NFR BLD AUTO: 2.1 % — SIGNIFICANT CHANGE UP (ref 0–6)
GLUCOSE SERPL-MCNC: 117 MG/DL — HIGH (ref 70–99)
HCT VFR BLD CALC: 36.2 % — SIGNIFICANT CHANGE UP (ref 34.5–45)
HGB BLD-MCNC: 11.9 G/DL — SIGNIFICANT CHANGE UP (ref 11.5–15.5)
INR BLD: 1.03 RATIO — SIGNIFICANT CHANGE UP (ref 0.88–1.16)
LIDOCAIN IGE QN: 219 U/L — SIGNIFICANT CHANGE UP (ref 73–393)
LYMPHOCYTES # BLD AUTO: 1.3 K/UL — SIGNIFICANT CHANGE UP (ref 1–3.3)
LYMPHOCYTES # BLD AUTO: 17.9 % — SIGNIFICANT CHANGE UP (ref 13–44)
MCHC RBC-ENTMCNC: 26.7 PG — LOW (ref 27–34)
MCHC RBC-ENTMCNC: 32.8 GM/DL — SIGNIFICANT CHANGE UP (ref 32–36)
MCV RBC AUTO: 81.4 FL — SIGNIFICANT CHANGE UP (ref 80–100)
MONOCYTES # BLD AUTO: 0.7 K/UL — SIGNIFICANT CHANGE UP (ref 0–0.9)
MONOCYTES NFR BLD AUTO: 9.4 % — SIGNIFICANT CHANGE UP (ref 2–14)
NEUTROPHILS # BLD AUTO: 5.1 K/UL — SIGNIFICANT CHANGE UP (ref 1.8–7.4)
NEUTROPHILS NFR BLD AUTO: 69.4 % — SIGNIFICANT CHANGE UP (ref 43–77)
NT-PROBNP SERPL-SCNC: 81 PG/ML — SIGNIFICANT CHANGE UP (ref 0–125)
PLATELET # BLD AUTO: 278 K/UL — SIGNIFICANT CHANGE UP (ref 150–400)
POTASSIUM SERPL-MCNC: 3.7 MMOL/L — SIGNIFICANT CHANGE UP (ref 3.5–5.3)
POTASSIUM SERPL-SCNC: 3.7 MMOL/L — SIGNIFICANT CHANGE UP (ref 3.5–5.3)
PROT SERPL-MCNC: 7 GM/DL — SIGNIFICANT CHANGE UP (ref 6–8.3)
PROTHROM AB SERPL-ACNC: 11.1 SEC — SIGNIFICANT CHANGE UP (ref 9.8–12.7)
RBC # BLD: 4.45 M/UL — SIGNIFICANT CHANGE UP (ref 3.8–5.2)
RBC # FLD: 13.7 % — SIGNIFICANT CHANGE UP (ref 10.3–14.5)
SODIUM SERPL-SCNC: 141 MMOL/L — SIGNIFICANT CHANGE UP (ref 135–145)
TROPONIN I SERPL-MCNC: <0.015 NG/ML — SIGNIFICANT CHANGE UP (ref 0.01–0.04)
TROPONIN I SERPL-MCNC: <0.015 NG/ML — SIGNIFICANT CHANGE UP (ref 0.01–0.04)
WBC # BLD: 7.4 K/UL — SIGNIFICANT CHANGE UP (ref 3.8–10.5)
WBC # FLD AUTO: 7.4 K/UL — SIGNIFICANT CHANGE UP (ref 3.8–10.5)

## 2017-11-29 PROCEDURE — 93010 ELECTROCARDIOGRAM REPORT: CPT

## 2017-11-29 PROCEDURE — 71275 CT ANGIOGRAPHY CHEST: CPT | Mod: 26

## 2017-11-29 PROCEDURE — 99285 EMERGENCY DEPT VISIT HI MDM: CPT | Mod: 25

## 2017-11-29 PROCEDURE — 71020: CPT | Mod: 26

## 2017-11-29 RX ORDER — SODIUM CHLORIDE 9 MG/ML
500 INJECTION INTRAMUSCULAR; INTRAVENOUS; SUBCUTANEOUS ONCE
Qty: 0 | Refills: 0 | Status: COMPLETED | OUTPATIENT
Start: 2017-11-29 | End: 2017-11-29

## 2017-11-29 RX ORDER — ASPIRIN/CALCIUM CARB/MAGNESIUM 324 MG
325 TABLET ORAL DAILY
Qty: 0 | Refills: 0 | Status: DISCONTINUED | OUTPATIENT
Start: 2017-11-29 | End: 2017-11-29

## 2017-11-29 RX ORDER — AMLODIPINE BESYLATE 2.5 MG/1
5 TABLET ORAL
Qty: 0 | Refills: 0 | COMMUNITY

## 2017-11-29 RX ADMIN — Medication 325 MILLIGRAM(S): at 18:07

## 2017-11-29 RX ADMIN — SODIUM CHLORIDE 500 MILLILITER(S): 9 INJECTION INTRAMUSCULAR; INTRAVENOUS; SUBCUTANEOUS at 21:05

## 2017-11-29 NOTE — ED PROVIDER NOTE - MEDICAL DECISION MAKING DETAILS
71 y/o white female HTN, HLD, RA, ambulatory to ED regarding 3 days of intermittent CP worsening today. +Pleuritic component. Plan EKG, Cardiac monitor, ASA, labs

## 2017-11-29 NOTE — ED PROVIDER NOTE - CONSTITUTIONAL, MLM
normal... Well appearing, well nourished, awake, alert, oriented to person, place, time/situation and in no apparent distress. white female adult. Non toxic. Well appearing, well nourished, awake, alert, oriented to person, place, time/situation and in no apparent distress.

## 2017-11-29 NOTE — ED ADULT NURSE NOTE - OBJECTIVE STATEMENT
pt c/o of worsening chest pain today after coming back from PT. chest pain radiates to the neck, left shoulder, and left ribs. denies palpitations. also notes left wrist pain from prior fall last week. cardiac and VS monitoring initiated.

## 2017-11-29 NOTE — ED PROVIDER NOTE - SKIN, MLM
Skin normal color for race, warm, dry and intact. No evidence of rash. Skin normal color for race, warm, dry and intact. No evidence of rash. +Yellow bruising left thoracis wall without TTP. No tactile warmth.

## 2017-11-29 NOTE — ED PROVIDER NOTE - DIAGNOSIS COUNSELING, MDM
conducted a detailed discussion... I had a detailed discussion with the patient and  regarding the historical points, exam findings, and any diagnostic results supporting the discharge/admit diagnosis.

## 2017-11-29 NOTE — ED PROVIDER NOTE - OBJECTIVE STATEMENT
73 y/o female with hx of HTN, RA  presents to the ED c/o CP  +  Denies 73 y/o female with hx of HTN, RA, HLD, TIA presents to the ED c/o left CP radiating to her neck starting three days ago. CP is aching and was severe this afternoon but moderate now. Worsened with breathing. Pt states today pain was more severe than previous days. Pt was in PT when pain started, but became increasingly worse after therapy to where she couldn't get out of a chair on her own. Pt states she fell a week ago and thinks she broke a rib. + cough. +hand and wrist pain. Pt was taken off of prednisone (10mg daily) 2 days ago because it makes her heart rate irregular. Pt is scheduled for a loop recorder insertion to monitor her irregular rate.  Denies SOB, NVD, palpitations. PCP - Dr Lepe. Cardio- Dr. Arguello.

## 2017-11-29 NOTE — ED PROVIDER NOTE - MUSCULOSKELETAL, MLM
Spine appears normal, range of motion is not limited, no muscle or joint tenderness Spine appears normal, range of motion is not limited. +mild TTP left lower chest wall(site of prior rib injury) does not reproduce CP complaint. Hand grasp adequate. Legs no swelling or TTP. LAWS x 4. B/L SLR 45 degrees

## 2017-11-29 NOTE — ED PROVIDER NOTE - EYES, MLM
Clear bilaterally, pupils equal, round and reactive to light. Clear bilaterally, pupils equal, round and reactive to light. Eomi

## 2017-11-29 NOTE — ED PROVIDER NOTE - CARDIAC, MLM
Normal rate, regular rhythm.  Heart sounds S1, S2.  No murmurs, rubs or gallops. Normal rate, regular rhythm.  Heart sounds S1, S2.  No murmurs, rubs or gallops. 2+Radial pulses.

## 2017-11-29 NOTE — ED PROVIDER NOTE - PROGRESS NOTE DETAILS
Dr. Horvath:  Reevaluated patient at bedside.  Patient feeling much improved.  Discussed the results of all diagnostic testing in ED and copies of all reports given.   An opportunity to ask questions was given.  Tele admission offered, pt declined, prefers outpt f/u with her own doctors.  Discussed the importance of prompt, close medical follow-up.  Patient will return with any changes, concerns or persistent / worsening symptoms.  Understanding of all instructions verbalized.

## 2017-11-29 NOTE — ED PROVIDER NOTE - ENMT, MLM
Airway patent, Nasal mucosa clear. Mouth with normal mucosa. Throat has no vesicles, no oropharyngeal exudates and uvula is midline. Airway patent, Nasal mucosa clear. Mouth with slightly dry mucosa. Throat has no vesicles, no oropharyngeal exudates and uvula is midline.

## 2017-11-29 NOTE — ED ADULT NURSE REASSESSMENT NOTE - NS ED NURSE REASSESS COMMENT FT1
Pt received in stretcher. Handoff given by RHONDA Slater. Pt a&ox3. MAEx4. Breathing spontaneously on RA. VS as documented. PVL intact. No IV implications. Pt awaiting for CT Angio to R/O PE. All needs are met and safety maintained. Will continue to monitor.

## 2017-11-29 NOTE — ED STATDOCS - PROGRESS NOTE DETAILS
Martín Pineda (scribe) for Dr. Kim: 71 yo female PMH RA, irregular HR, presents with bilateral wrist pain and left chest pain that began a few days ago.  States it worsened after she stopped taking prednisone.  Denies nausea, diaphoresis.  States she had a stroke a few weeks ago.  No hx of stents, bypass, or MI.  Scheduled to get loop recorder for her irregular HR.  CARDS MANCHI.  Will triage patient to Main for further evaluation.

## 2017-12-11 ENCOUNTER — OUTPATIENT (OUTPATIENT)
Dept: OUTPATIENT SERVICES | Facility: HOSPITAL | Age: 72
LOS: 1 days | Discharge: HOME | End: 2017-12-11

## 2017-12-11 VITALS
HEART RATE: 81 BPM | TEMPERATURE: 98 F | RESPIRATION RATE: 18 BRPM | SYSTOLIC BLOOD PRESSURE: 132 MMHG | DIASTOLIC BLOOD PRESSURE: 78 MMHG | HEIGHT: 68 IN | WEIGHT: 151.9 LBS

## 2017-12-11 VITALS
OXYGEN SATURATION: 98 % | DIASTOLIC BLOOD PRESSURE: 74 MMHG | SYSTOLIC BLOOD PRESSURE: 140 MMHG | RESPIRATION RATE: 18 BRPM

## 2017-12-11 DIAGNOSIS — Z98.890 OTHER SPECIFIED POSTPROCEDURAL STATES: Chronic | ICD-10-CM

## 2017-12-11 DIAGNOSIS — T84.093A OTHER MECHANICAL COMPLICATION OF INTERNAL LEFT KNEE PROSTHESIS, INITIAL ENCOUNTER: Chronic | ICD-10-CM

## 2017-12-11 DIAGNOSIS — Z96.651 PRESENCE OF RIGHT ARTIFICIAL KNEE JOINT: Chronic | ICD-10-CM

## 2017-12-11 RX ORDER — CEFAZOLIN SODIUM 1 G
1000 VIAL (EA) INJECTION ONCE
Qty: 0 | Refills: 0 | Status: COMPLETED | OUTPATIENT
Start: 2017-12-11 | End: 2017-12-11

## 2017-12-11 RX ORDER — FENTANYL CITRATE 50 UG/ML
50 INJECTION INTRAVENOUS ONCE
Qty: 0 | Refills: 0 | Status: DISCONTINUED | OUTPATIENT
Start: 2017-12-11 | End: 2017-12-11

## 2017-12-11 RX ORDER — MIDAZOLAM HYDROCHLORIDE 1 MG/ML
1 INJECTION, SOLUTION INTRAMUSCULAR; INTRAVENOUS ONCE
Qty: 0 | Refills: 0 | Status: DISCONTINUED | OUTPATIENT
Start: 2017-12-11 | End: 2017-12-11

## 2017-12-11 RX ADMIN — Medication 100 MILLIGRAM(S): at 12:08

## 2017-12-11 RX ADMIN — FENTANYL CITRATE 50 MICROGRAM(S): 50 INJECTION INTRAVENOUS at 13:00

## 2017-12-11 RX ADMIN — MIDAZOLAM HYDROCHLORIDE 1 MILLIGRAM(S): 1 INJECTION, SOLUTION INTRAMUSCULAR; INTRAVENOUS at 13:01

## 2017-12-11 NOTE — PACU DISCHARGE NOTE - COMMENTS
discharge instruction given to patient and . Verbalized understanding. Denies pain or discomfort. All belongings are with the patient. Left the unit with the , ambulatory, at 1400.

## 2017-12-14 ENCOUNTER — APPOINTMENT (OUTPATIENT)
Dept: NEUROLOGY | Facility: CLINIC | Age: 72
End: 2017-12-14
Payer: MEDICARE

## 2017-12-14 VITALS
WEIGHT: 143 LBS | DIASTOLIC BLOOD PRESSURE: 66 MMHG | SYSTOLIC BLOOD PRESSURE: 122 MMHG | BODY MASS INDEX: 21.67 KG/M2 | HEART RATE: 78 BPM | HEIGHT: 68 IN

## 2017-12-14 PROCEDURE — 99214 OFFICE O/P EST MOD 30 MIN: CPT

## 2017-12-14 RX ORDER — PREDNISONE 5 MG/1
5 TABLET ORAL
Qty: 60 | Refills: 0 | Status: DISCONTINUED | COMMUNITY
Start: 2017-07-13 | End: 2017-12-14

## 2017-12-20 DIAGNOSIS — Z79.82 LONG TERM (CURRENT) USE OF ASPIRIN: ICD-10-CM

## 2017-12-20 DIAGNOSIS — R26.89 OTHER ABNORMALITIES OF GAIT AND MOBILITY: ICD-10-CM

## 2017-12-20 DIAGNOSIS — Z96.653 PRESENCE OF ARTIFICIAL KNEE JOINT, BILATERAL: ICD-10-CM

## 2017-12-20 DIAGNOSIS — E78.2 MIXED HYPERLIPIDEMIA: ICD-10-CM

## 2017-12-20 DIAGNOSIS — I10 ESSENTIAL (PRIMARY) HYPERTENSION: ICD-10-CM

## 2017-12-20 DIAGNOSIS — M06.9 RHEUMATOID ARTHRITIS, UNSPECIFIED: ICD-10-CM

## 2017-12-20 DIAGNOSIS — Z86.73 PERSONAL HISTORY OF TRANSIENT ISCHEMIC ATTACK (TIA), AND CEREBRAL INFARCTION WITHOUT RESIDUAL DEFICITS: ICD-10-CM

## 2018-01-02 ENCOUNTER — RX RENEWAL (OUTPATIENT)
Age: 73
End: 2018-01-02

## 2018-01-18 ENCOUNTER — APPOINTMENT (OUTPATIENT)
Dept: NEUROLOGY | Facility: CLINIC | Age: 73
End: 2018-01-18
Payer: MEDICARE

## 2018-01-18 VITALS
HEIGHT: 68 IN | BODY MASS INDEX: 21.67 KG/M2 | DIASTOLIC BLOOD PRESSURE: 60 MMHG | WEIGHT: 143 LBS | SYSTOLIC BLOOD PRESSURE: 114 MMHG | HEART RATE: 76 BPM

## 2018-01-18 PROCEDURE — 99214 OFFICE O/P EST MOD 30 MIN: CPT

## 2018-01-18 RX ORDER — PANTOPRAZOLE 40 MG/1
40 TABLET, DELAYED RELEASE ORAL
Qty: 30 | Refills: 0 | Status: DISCONTINUED | COMMUNITY
Start: 2017-04-24 | End: 2018-01-18

## 2018-01-18 RX ORDER — AMLODIPINE BESYLATE 10 MG/1
10 TABLET ORAL
Qty: 60 | Refills: 0 | Status: ACTIVE | COMMUNITY
Start: 2017-11-10

## 2018-01-18 RX ORDER — ATORVASTATIN CALCIUM 10 MG/1
10 TABLET, FILM COATED ORAL
Qty: 30 | Refills: 0 | Status: ACTIVE | COMMUNITY
Start: 2017-12-05

## 2018-01-18 RX ORDER — NITROFURANTOIN (MONOHYDRATE/MACROCRYSTALS) 25; 75 MG/1; MG/1
100 CAPSULE ORAL
Qty: 14 | Refills: 0 | Status: COMPLETED | COMMUNITY
Start: 2018-01-15

## 2018-01-18 RX ORDER — FAMOTIDINE 20 MG/1
20 TABLET, FILM COATED ORAL
Qty: 60 | Refills: 0 | Status: DISCONTINUED | COMMUNITY
Start: 2017-08-07 | End: 2018-01-18

## 2018-01-18 RX ORDER — AMLODIPINE BESYLATE 5 MG/1
5 TABLET ORAL
Qty: 90 | Refills: 0 | Status: DISCONTINUED | COMMUNITY
Start: 2017-05-25 | End: 2018-01-18

## 2018-01-18 RX ORDER — PREDNISONE 2.5 MG/1
2.5 TABLET ORAL
Qty: 6 | Refills: 0 | Status: DISCONTINUED | COMMUNITY
Start: 2017-11-30 | End: 2018-01-18

## 2018-01-29 NOTE — PATIENT PROFILE ADULT. - MEDICATION ADMINISTRATION INFO, PROFILE
chest wall appears symmetric, good expansion,   normal effort without use of accessory muscles  Musculoskeletal  no significant wasting of muscles noted, no bony deformities, gait no gross ataxia  Extremities-no clubbing, cyanosis or edema  Skin  warm, dry, and intact. No rash, erythema, or pallor. Psychiatric  mood, affect, and behavior appear normal.      Neurology  NEUROLOGICAL EXAM:      Mental status   Awake, alert, fluent oriented x 3 appropriate affect  Attention and concentration appear appropriate  Recent and remote memory appears unremarkable  Speech normal without dysarthria  No clear issues with language       Cranial Nerves   CN II- Visual fields grossly unremarkable  CN III, IV,VI-EOMI, No nystagmus, conjugate eye movements, no ptosis  CN VII-no facial assymetry  CN VIII-Hearing intact      Motor function  Antigravity x 4; 0/5 left hip adduction     Sensory function Intact to light touch     Cerebellar F-N intact     Tremor None present     Gait                  Not tested           Nursing/pcp notes, imaging,labs and vitals reviewed.      PT,OT and/or speech notes reviewed    Lab Results   Component Value Date    WBC 5.9 01/29/2018    HGB 11.2 (L) 01/29/2018    HCT 35.6 (L) 01/29/2018    MCV 94.7 01/29/2018     01/29/2018     Lab Results   Component Value Date     01/25/2018    K 3.7 01/25/2018     01/25/2018    CO2 28 01/25/2018    BUN 12 01/25/2018    CREATININE 0.5 01/25/2018    GLUCOSE 145 (H) 01/25/2018    CALCIUM 7.8 (L) 01/25/2018    PROT 6.6 02/19/2015    LABALBU 3.2 (L) 02/19/2015    ALKPHOS 101 02/19/2015    AST 24 02/19/2015    ALT 14 02/19/2015    LABGLOM >60 01/25/2018     Lab Results   Component Value Date    INR 1.12 02/19/2015    INR 1.33 (H) 05/14/2014    INR 1.17 05/13/2014     Lab Results   Component Value Date    CRP 1.90 (H) 05/14/2014     BED MOBILITY  Rolling: Modified independent  Supine to Sit: Modified independent  Sit to Supine: Minimal assistance  TRANSFERS  Sit to Stand: Stand by assistance  Bed to Chair: Stand by assistance     WHEELCHAIR PROPULSION  Level of Assistance: Stand by assistance  Distance: 300FT     AMBULATION  Device: Rolling Walker  Assistance: Contact guard assistance  Distance: 300FT  Quality of Gait: PATIENT MUST LOCK LLE TO PROGRESS RLE. SLIGHT LATERAL DEVIATION OF LEFT HIP WHEN PROGRESSING RLE. PATIENT REQUIRED VCS TO STAND TALL. RECORD REVIEW: Previous medical records, medications were reviewed at today's visit    IMPRESSION:   1. Lumbar spondylolisthesis and stenosis status post 2-stage laminectomy and fusion with left leg adduction weaknessPT/OT/pain control. Prednisone taper  2. Constipation likely drug-induced. Bowel regimen. Added Linzess and lactulose. 3. DVT prophylaxisSCDs  4. Essential hypertensionmedication. Stable. Monitor  5.  Coronary artery diseasecontinue Brilinta  Dr. Hernandez Noelle following  ELOS: restaff this week no concerns

## 2018-03-01 ENCOUNTER — RX RENEWAL (OUTPATIENT)
Age: 73
End: 2018-03-01

## 2018-03-14 ENCOUNTER — FORM ENCOUNTER (OUTPATIENT)
Age: 73
End: 2018-03-14

## 2018-03-14 ENCOUNTER — APPOINTMENT (OUTPATIENT)
Dept: RHEUMATOLOGY | Facility: CLINIC | Age: 73
End: 2018-03-14
Payer: MEDICARE

## 2018-03-14 VITALS
BODY MASS INDEX: 22.73 KG/M2 | HEIGHT: 68 IN | SYSTOLIC BLOOD PRESSURE: 108 MMHG | OXYGEN SATURATION: 97 % | DIASTOLIC BLOOD PRESSURE: 88 MMHG | WEIGHT: 150 LBS | HEART RATE: 85 BPM

## 2018-03-14 DIAGNOSIS — Z86.79 PERSONAL HISTORY OF OTHER DISEASES OF THE CIRCULATORY SYSTEM: ICD-10-CM

## 2018-03-14 PROCEDURE — 99205 OFFICE O/P NEW HI 60 MIN: CPT

## 2018-03-15 ENCOUNTER — OUTPATIENT (OUTPATIENT)
Dept: OUTPATIENT SERVICES | Facility: HOSPITAL | Age: 73
LOS: 1 days | End: 2018-03-15
Payer: MEDICARE

## 2018-03-15 ENCOUNTER — APPOINTMENT (OUTPATIENT)
Dept: RADIOLOGY | Facility: CLINIC | Age: 73
End: 2018-03-15
Payer: MEDICARE

## 2018-03-15 DIAGNOSIS — Z98.890 OTHER SPECIFIED POSTPROCEDURAL STATES: Chronic | ICD-10-CM

## 2018-03-15 DIAGNOSIS — Z96.651 PRESENCE OF RIGHT ARTIFICIAL KNEE JOINT: Chronic | ICD-10-CM

## 2018-03-15 DIAGNOSIS — M06.9 RHEUMATOID ARTHRITIS, UNSPECIFIED: ICD-10-CM

## 2018-03-15 DIAGNOSIS — T84.093A OTHER MECHANICAL COMPLICATION OF INTERNAL LEFT KNEE PROSTHESIS, INITIAL ENCOUNTER: Chronic | ICD-10-CM

## 2018-03-15 PROBLEM — Z86.79 HISTORY OF ESSENTIAL HYPERTENSION: Status: RESOLVED | Noted: 2018-03-15 | Resolved: 2018-03-15

## 2018-03-15 PROCEDURE — 71046 X-RAY EXAM CHEST 2 VIEWS: CPT | Mod: 26

## 2018-03-15 PROCEDURE — 77080 DXA BONE DENSITY AXIAL: CPT

## 2018-03-15 PROCEDURE — 77080 DXA BONE DENSITY AXIAL: CPT | Mod: 26

## 2018-03-15 PROCEDURE — 71046 X-RAY EXAM CHEST 2 VIEWS: CPT

## 2018-03-19 LAB
ADJUSTED MITOGEN: >10 IU/ML
ADJUSTED TB AG: -0.01 IU/ML
ALBUMIN SERPL ELPH-MCNC: 3.8 G/DL
ALP BLD-CCNC: 122 U/L
ALT SERPL-CCNC: 21 U/L
ANION GAP SERPL CALC-SCNC: 12 MMOL/L
AST SERPL-CCNC: 23 U/L
BASOPHILS # BLD AUTO: 0.02 K/UL
BASOPHILS NFR BLD AUTO: 0.4 %
BILIRUB SERPL-MCNC: 0.5 MG/DL
BUN SERPL-MCNC: 18 MG/DL
CALCIUM SERPL-MCNC: 9.2 MG/DL
CHLORIDE SERPL-SCNC: 105 MMOL/L
CO2 SERPL-SCNC: 26 MMOL/L
CREAT SERPL-MCNC: 0.92 MG/DL
CRP SERPL-MCNC: 0.4 MG/DL
EOSINOPHIL # BLD AUTO: 0.22 K/UL
EOSINOPHIL NFR BLD AUTO: 4.7 %
ERYTHROCYTE [SEDIMENTATION RATE] IN BLOOD BY WESTERGREN METHOD: 4 MM/HR
GLUCOSE SERPL-MCNC: 93 MG/DL
HAV IGM SER QL: NONREACTIVE
HBV CORE IGM SER QL: NONREACTIVE
HBV SURFACE AG SER QL: NONREACTIVE
HCT VFR BLD CALC: 34.9 %
HCV AB SER QL: NONREACTIVE
HCV S/CO RATIO: 0.21 S/CO
HGB BLD-MCNC: 11.7 G/DL
IMM GRANULOCYTES NFR BLD AUTO: 0.2 %
LYMPHOCYTES # BLD AUTO: 1.34 K/UL
LYMPHOCYTES NFR BLD AUTO: 28.5 %
M TB IFN-G BLD-IMP: NEGATIVE
MAN DIFF?: NORMAL
MCHC RBC-ENTMCNC: 28.2 PG
MCHC RBC-ENTMCNC: 33.5 GM/DL
MCV RBC AUTO: 84.1 FL
MONOCYTES # BLD AUTO: 0.64 K/UL
MONOCYTES NFR BLD AUTO: 13.6 %
NEUTROPHILS # BLD AUTO: 2.47 K/UL
NEUTROPHILS NFR BLD AUTO: 52.6 %
PLATELET # BLD AUTO: 219 K/UL
POTASSIUM SERPL-SCNC: 3.7 MMOL/L
PROT SERPL-MCNC: 6.9 G/DL
QUANTIFERON GOLD NIL: 0.04 IU/ML
RBC # BLD: 4.15 M/UL
RBC # FLD: 16 %
SODIUM SERPL-SCNC: 143 MMOL/L
WBC # FLD AUTO: 4.7 K/UL

## 2018-03-26 ENCOUNTER — RX RENEWAL (OUTPATIENT)
Age: 73
End: 2018-03-26

## 2018-04-04 ENCOUNTER — APPOINTMENT (OUTPATIENT)
Dept: RHEUMATOLOGY | Facility: CLINIC | Age: 73
End: 2018-04-04
Payer: MEDICARE

## 2018-04-04 VITALS
HEIGHT: 68 IN | BODY MASS INDEX: 22.73 KG/M2 | OXYGEN SATURATION: 96 % | WEIGHT: 150 LBS | DIASTOLIC BLOOD PRESSURE: 74 MMHG | HEART RATE: 88 BPM | SYSTOLIC BLOOD PRESSURE: 123 MMHG

## 2018-04-04 PROCEDURE — 96372 THER/PROPH/DIAG INJ SC/IM: CPT

## 2018-04-04 PROCEDURE — 99214 OFFICE O/P EST MOD 30 MIN: CPT | Mod: 25

## 2018-04-04 RX ORDER — APIXABAN 5 MG/1
5 TABLET, FILM COATED ORAL
Refills: 0 | Status: ACTIVE | COMMUNITY

## 2018-04-04 RX ORDER — CERTOLIZUMAB PEGOL 400 MG
2 X 200 KIT SUBCUTANEOUS
Qty: 1 | Refills: 0 | Status: COMPLETED | OUTPATIENT
Start: 2018-04-04

## 2018-04-04 RX ADMIN — CERTOLIZUMAB PEGOL 400 MG: KIT at 00:00

## 2018-04-12 ENCOUNTER — MOBILE ON CALL (OUTPATIENT)
Age: 73
End: 2018-04-12

## 2018-04-12 NOTE — ED PROVIDER NOTE - CPE EDP GASTRO NORM
Patient called stating that the colonoscopy done on 01/26/2018 was coded wrong and now her insurance won't pay. Please call her back at [phone]  
Routed to Dr. Toney.   Please review   
normal...

## 2018-04-17 ENCOUNTER — APPOINTMENT (OUTPATIENT)
Dept: RHEUMATOLOGY | Facility: CLINIC | Age: 73
End: 2018-04-17
Payer: MEDICARE

## 2018-04-17 VITALS
SYSTOLIC BLOOD PRESSURE: 120 MMHG | DIASTOLIC BLOOD PRESSURE: 82 MMHG | OXYGEN SATURATION: 97 % | HEIGHT: 68 IN | HEART RATE: 128 BPM | WEIGHT: 150 LBS | BODY MASS INDEX: 22.73 KG/M2

## 2018-04-17 PROCEDURE — 96372 THER/PROPH/DIAG INJ SC/IM: CPT | Mod: 59

## 2018-04-17 RX ORDER — CERTOLIZUMAB PEGOL 400 MG
2 X 200 KIT SUBCUTANEOUS
Qty: 1 | Refills: 0 | Status: COMPLETED | OUTPATIENT
Start: 2018-04-17

## 2018-04-17 RX ADMIN — CERTOLIZUMAB PEGOL 400 MG: KIT at 00:00

## 2018-04-24 ENCOUNTER — APPOINTMENT (OUTPATIENT)
Dept: NEUROLOGY | Facility: CLINIC | Age: 73
End: 2018-04-24
Payer: MEDICARE

## 2018-04-24 VITALS
SYSTOLIC BLOOD PRESSURE: 128 MMHG | HEART RATE: 78 BPM | DIASTOLIC BLOOD PRESSURE: 72 MMHG | BODY MASS INDEX: 22.73 KG/M2 | HEIGHT: 68 IN | WEIGHT: 150 LBS

## 2018-04-24 PROCEDURE — 99214 OFFICE O/P EST MOD 30 MIN: CPT

## 2018-04-24 NOTE — CONSULT LETTER
[Dear  ___] : Dear  [unfilled], [Consult Letter:] : I had the pleasure of evaluating your patient, [unfilled]. [Please see my note below.] : Please see my note below. [Consult Closing:] : Thank you very much for allowing me to participate in the care of this patient.  If you have any questions, please do not hesitate to contact me. [Sincerely,] : Sincerely, [Leydi Beck MD] : Leydi Beck MD [Attending Neurologist] : Attending Neurologist [[facility]] : [facility] [[facility]] : [facility] [ of Neurology] :  of  Neurology [[facility]] : [facility] [DrTalon  ___] : Dr. OLIVAREZ

## 2018-04-24 NOTE — HISTORY OF PRESENT ILLNESS
[FreeTextEntry1] : She is 72-year-old patient with history of rheumatoid arthritis and right CVA with left hemiparesis with a recent diagnosis of new onset atrial fibrillation on loop recorder started on Eliqius stable neurologically no recurrent symptoms also diagnosed with rheumatoid arthritis currently started on Cimzia in the care of rheumatologist.\par \par No neurological symptoms currently exercising regularly no focal  weakness or paresthesias numbness. Left leg weakness improved significantly. Completed physical therapy. No headaches no dizziness tolerated medications well.

## 2018-04-24 NOTE — REVIEW OF SYSTEMS
[Arm Weakness] : no arm weakness [Hand Weakness] : no hand weakness [Leg Weakness] : leg weakness [Dizziness] : dizziness [Difficulty Walking] : no difficulty walking [Arthralgias] : arthralgias [Joint Pain] : joint pain [Negative] : Heme/Lymph [de-identified] : Improved focal weakness

## 2018-04-24 NOTE — DISCUSSION/SUMMARY
[FreeTextEntry1] : Patient with history of acute right CVA with left hemiparesis with a recent new onset of atrial fibrillation diagnosis.\par \par I agree with eliquis and continue present medications. \par \par Adequate control of her blood pressure.\par \par Followup with the rheumatologist and cardiology.\par \par Follow up with you for other medical problems.\par \par Continue aspirin with statin.\par \par Continue regular exercises.\par \par Followup evaluation 6 months or as needed.\par \par Patient education provided regarding stroke with A. fib and prevention.\par \par \par \par

## 2018-04-24 NOTE — REASON FOR VISIT
[Follow-Up: _____] : a [unfilled] follow-up visit [FreeTextEntry1] : Follow up for Rt CVa with left hemiparesis

## 2018-04-24 NOTE — PHYSICAL EXAM
[General Appearance - Alert] : alert [General Appearance - In No Acute Distress] : in no acute distress [Oriented To Time, Place, And Person] : oriented to person, place, and time [Impaired Insight] : insight and judgment were intact [Affect] : the affect was normal [Person] : oriented to person [Place] : oriented to place [Time] : oriented to time [Concentration Intact] : normal concentrating ability [Visual Intact] : visual attention was ~T not ~L decreased [Naming Objects] : no difficulty naming common objects [Repeating Phrases] : no difficulty repeating a phrase [Writing A Sentence] : no difficulty writing a sentence [Fluency] : fluency intact [Comprehension] : comprehension intact [Reading] : reading intact [Past History] : adequate knowledge of personal past history [Cranial Nerves Optic (II)] : visual acuity intact bilaterally,  visual fields full to confrontation, pupils equal round and reactive to light [Cranial Nerves Oculomotor (III)] : extraocular motion intact [Cranial Nerves Trigeminal (V)] : facial sensation intact symmetrically [Cranial Nerves Facial (VII)] : face symmetrical [Cranial Nerves Vestibulocochlear (VIII)] : hearing was intact bilaterally [Cranial Nerves Glossopharyngeal (IX)] : tongue and palate midline [Cranial Nerves Accessory (XI - Cranial And Spinal)] : head turning and shoulder shrug symmetric [Cranial Nerves Hypoglossal (XII)] : there was no tongue deviation with protrusion [Motor Strength] : muscle strength was normal in all four extremities [No Muscle Atrophy] : normal bulk in all four extremities [Sensation Tactile Decrease] : light touch was intact [Romberg's Sign] : a positive Romberg's sign was present [Limited Balance] : the patient's balance was impaired [Past-pointing] : there was no past-pointing [Tremor] : no tremor present [2+] : Ankle jerk left 2+ [Plantar Reflex Right Only] : normal on the right [Plantar Reflex Left Only] : normal on the left [FreeTextEntry6] : No focal weakness [FreeTextEntry8] : Minimal difficulties with walking  [FreeTextEntry9] : Slightly brisk on left [Sclera] : the sclera and conjunctiva were normal [PERRL With Normal Accommodation] : pupils were equal in size, round, reactive to light, with normal accommodation [Extraocular Movements] : extraocular movements were intact [Outer Ear] : the ears and nose were normal in appearance [Oropharynx] : the oropharynx was normal [Neck Appearance] : the appearance of the neck was normal [Neck Cervical Mass (___cm)] : no neck mass was observed [Jugular Venous Distention Increased] : there was no jugular-venous distention [Thyroid Diffuse Enlargement] : the thyroid was not enlarged [Thyroid Nodule] : there were no palpable thyroid nodules [Auscultation Breath Sounds / Voice Sounds] : lungs were clear to auscultation bilaterally [Heart Rate And Rhythm] : heart rate was normal and rhythm regular [Heart Sounds] : normal S1 and S2 [Heart Sounds Gallop] : no gallops [Murmurs] : no murmurs [Heart Sounds Pericardial Friction Rub] : no pericardial rub [Full Pulse] : the pedal pulses are present [Edema] : there was no peripheral edema [Bowel Sounds] : normal bowel sounds [Abdomen Soft] : soft [Abdomen Tenderness] : non-tender [Abdomen Mass (___ Cm)] : no abdominal mass palpated [No CVA Tenderness] : no ~M costovertebral angle tenderness [No Spinal Tenderness] : no spinal tenderness [Abnormal Walk] : normal gait [Nail Clubbing] : no clubbing  or cyanosis of the fingernails [Motor Tone] : muscle strength and tone were normal [FreeTextEntry1] : Multiple surgeries both knees limited difficulty with walking left LE [Skin Color & Pigmentation] : normal skin color and pigmentation [Skin Turgor] : normal skin turgor [] : no rash

## 2018-05-02 ENCOUNTER — APPOINTMENT (OUTPATIENT)
Dept: RHEUMATOLOGY | Facility: CLINIC | Age: 73
End: 2018-05-02
Payer: MEDICARE

## 2018-05-02 VITALS
SYSTOLIC BLOOD PRESSURE: 108 MMHG | WEIGHT: 149 LBS | HEART RATE: 69 BPM | DIASTOLIC BLOOD PRESSURE: 54 MMHG | BODY MASS INDEX: 22.58 KG/M2 | OXYGEN SATURATION: 98 % | HEIGHT: 68 IN

## 2018-05-02 PROCEDURE — 20610 DRAIN/INJ JOINT/BURSA W/O US: CPT | Mod: LT

## 2018-05-02 PROCEDURE — 96372 THER/PROPH/DIAG INJ SC/IM: CPT | Mod: 59

## 2018-05-02 RX ORDER — CERTOLIZUMAB PEGOL 400 MG
2 X 200 KIT SUBCUTANEOUS
Qty: 1 | Refills: 0 | Status: COMPLETED | OUTPATIENT
Start: 2018-05-02

## 2018-05-02 RX ADMIN — CERTOLIZUMAB PEGOL 400 MG: KIT at 00:00

## 2018-06-04 ENCOUNTER — APPOINTMENT (OUTPATIENT)
Dept: RHEUMATOLOGY | Facility: CLINIC | Age: 73
End: 2018-06-04
Payer: MEDICARE

## 2018-06-04 VITALS
DIASTOLIC BLOOD PRESSURE: 66 MMHG | BODY MASS INDEX: 22.58 KG/M2 | HEART RATE: 74 BPM | HEIGHT: 68 IN | WEIGHT: 149 LBS | OXYGEN SATURATION: 96 % | SYSTOLIC BLOOD PRESSURE: 112 MMHG

## 2018-06-04 PROCEDURE — 99214 OFFICE O/P EST MOD 30 MIN: CPT | Mod: 25

## 2018-06-04 PROCEDURE — 96372 THER/PROPH/DIAG INJ SC/IM: CPT | Mod: 59

## 2018-06-04 RX ORDER — CERTOLIZUMAB PEGOL 400 MG
2 X 200 KIT SUBCUTANEOUS
Qty: 1 | Refills: 0 | Status: COMPLETED | OUTPATIENT
Start: 2018-06-04

## 2018-06-04 RX ADMIN — CERTOLIZUMAB PEGOL 400 MG: KIT at 00:00

## 2018-07-06 ENCOUNTER — APPOINTMENT (OUTPATIENT)
Dept: RHEUMATOLOGY | Facility: CLINIC | Age: 73
End: 2018-07-06
Payer: MEDICARE

## 2018-07-06 VITALS
SYSTOLIC BLOOD PRESSURE: 138 MMHG | BODY MASS INDEX: 22.58 KG/M2 | HEIGHT: 68 IN | WEIGHT: 149 LBS | HEART RATE: 85 BPM | OXYGEN SATURATION: 97 % | DIASTOLIC BLOOD PRESSURE: 84 MMHG

## 2018-07-06 PROCEDURE — 99213 OFFICE O/P EST LOW 20 MIN: CPT | Mod: 25

## 2018-07-06 PROCEDURE — 96372 THER/PROPH/DIAG INJ SC/IM: CPT | Mod: 59

## 2018-07-06 RX ORDER — CERTOLIZUMAB PEGOL 400 MG
2 X 200 KIT SUBCUTANEOUS
Qty: 1 | Refills: 0 | Status: COMPLETED | OUTPATIENT
Start: 2018-07-06

## 2018-07-06 RX ADMIN — CERTOLIZUMAB PEGOL 400 MG: KIT at 00:00

## 2018-08-17 ENCOUNTER — APPOINTMENT (OUTPATIENT)
Dept: RHEUMATOLOGY | Facility: CLINIC | Age: 73
End: 2018-08-17
Payer: MEDICARE

## 2018-08-17 VITALS
OXYGEN SATURATION: 98 % | HEART RATE: 84 BPM | HEIGHT: 68 IN | DIASTOLIC BLOOD PRESSURE: 77 MMHG | WEIGHT: 149 LBS | SYSTOLIC BLOOD PRESSURE: 123 MMHG | BODY MASS INDEX: 22.58 KG/M2

## 2018-08-17 PROBLEM — I10 ESSENTIAL (PRIMARY) HYPERTENSION: Chronic | Status: ACTIVE | Noted: 2017-07-27

## 2018-08-17 PROBLEM — R42 DIZZINESS AND GIDDINESS: Chronic | Status: ACTIVE | Noted: 2017-07-27

## 2018-08-17 PROBLEM — M06.9 RHEUMATOID ARTHRITIS, UNSPECIFIED: Chronic | Status: ACTIVE | Noted: 2017-04-26

## 2018-08-17 PROBLEM — R26.81 UNSTEADINESS ON FEET: Chronic | Status: ACTIVE | Noted: 2017-07-27

## 2018-08-17 PROCEDURE — 96372 THER/PROPH/DIAG INJ SC/IM: CPT | Mod: 59

## 2018-08-17 PROCEDURE — 99214 OFFICE O/P EST MOD 30 MIN: CPT | Mod: 25

## 2018-08-17 RX ORDER — CERTOLIZUMAB PEGOL 400 MG
2 X 200 KIT SUBCUTANEOUS
Qty: 1 | Refills: 0 | Status: COMPLETED | OUTPATIENT
Start: 2018-08-17

## 2018-08-17 RX ORDER — HYDROXYCHLOROQUINE SULFATE 200 MG/1
200 TABLET, FILM COATED ORAL
Qty: 90 | Refills: 0 | Status: DISCONTINUED | COMMUNITY
Start: 2017-08-15 | End: 2018-08-17

## 2018-08-17 RX ORDER — GABAPENTIN 100 MG/1
100 CAPSULE ORAL
Qty: 90 | Refills: 0 | Status: DISCONTINUED | COMMUNITY
Start: 2017-09-01 | End: 2018-08-17

## 2018-08-17 RX ADMIN — CERTOLIZUMAB PEGOL 400 MG: KIT at 00:00

## 2018-08-28 ENCOUNTER — RX RENEWAL (OUTPATIENT)
Age: 73
End: 2018-08-28

## 2018-08-28 ENCOUNTER — OTHER (OUTPATIENT)
Age: 73
End: 2018-08-28

## 2018-09-28 ENCOUNTER — OTHER (OUTPATIENT)
Age: 73
End: 2018-09-28

## 2018-09-28 ENCOUNTER — APPOINTMENT (OUTPATIENT)
Dept: RHEUMATOLOGY | Facility: CLINIC | Age: 73
End: 2018-09-28
Payer: MEDICARE

## 2018-09-28 VITALS
BODY MASS INDEX: 22.58 KG/M2 | HEIGHT: 68 IN | HEART RATE: 82 BPM | OXYGEN SATURATION: 98 % | SYSTOLIC BLOOD PRESSURE: 124 MMHG | DIASTOLIC BLOOD PRESSURE: 80 MMHG | WEIGHT: 149 LBS

## 2018-09-28 DIAGNOSIS — M19.012 PRIMARY OSTEOARTHRITIS, LEFT SHOULDER: ICD-10-CM

## 2018-09-28 PROCEDURE — 99214 OFFICE O/P EST MOD 30 MIN: CPT

## 2018-10-12 ENCOUNTER — APPOINTMENT (OUTPATIENT)
Dept: RHEUMATOLOGY | Facility: CLINIC | Age: 73
End: 2018-10-12
Payer: MEDICARE

## 2018-10-12 VITALS
WEIGHT: 149 LBS | HEART RATE: 86 BPM | DIASTOLIC BLOOD PRESSURE: 75 MMHG | SYSTOLIC BLOOD PRESSURE: 148 MMHG | BODY MASS INDEX: 22.58 KG/M2 | OXYGEN SATURATION: 98 % | HEIGHT: 68 IN

## 2018-10-12 PROCEDURE — 96372 THER/PROPH/DIAG INJ SC/IM: CPT | Mod: 59

## 2018-10-12 PROCEDURE — 99213 OFFICE O/P EST LOW 20 MIN: CPT | Mod: 25

## 2018-10-12 RX ORDER — CERTOLIZUMAB PEGOL 400 MG
2 X 200 KIT SUBCUTANEOUS
Qty: 1 | Refills: 0 | Status: COMPLETED | OUTPATIENT
Start: 2018-10-12

## 2018-10-12 RX ADMIN — CERTOLIZUMAB PEGOL 400 MG: KIT at 00:00

## 2018-10-29 ENCOUNTER — APPOINTMENT (OUTPATIENT)
Dept: NEUROLOGY | Facility: CLINIC | Age: 73
End: 2018-10-29
Payer: MEDICARE

## 2018-10-29 ENCOUNTER — OTHER (OUTPATIENT)
Age: 73
End: 2018-10-29

## 2018-10-29 VITALS
HEART RATE: 85 BPM | WEIGHT: 146 LBS | HEIGHT: 68 IN | SYSTOLIC BLOOD PRESSURE: 143 MMHG | BODY MASS INDEX: 22.13 KG/M2 | DIASTOLIC BLOOD PRESSURE: 78 MMHG

## 2018-10-29 PROCEDURE — 99214 OFFICE O/P EST MOD 30 MIN: CPT

## 2018-10-29 RX ORDER — TOFACITINIB 11 MG/1
11 TABLET, FILM COATED, EXTENDED RELEASE ORAL
Qty: 90 | Refills: 1 | Status: DISCONTINUED | COMMUNITY
Start: 2018-08-17 | End: 2018-10-29

## 2018-11-13 ENCOUNTER — APPOINTMENT (OUTPATIENT)
Dept: RHEUMATOLOGY | Facility: CLINIC | Age: 73
End: 2018-11-13
Payer: MEDICARE

## 2018-11-13 ENCOUNTER — FORM ENCOUNTER (OUTPATIENT)
Age: 73
End: 2018-11-13

## 2018-11-13 VITALS
DIASTOLIC BLOOD PRESSURE: 74 MMHG | HEART RATE: 84 BPM | BODY MASS INDEX: 22.13 KG/M2 | HEIGHT: 68 IN | OXYGEN SATURATION: 98 % | SYSTOLIC BLOOD PRESSURE: 118 MMHG | WEIGHT: 146 LBS

## 2018-11-13 DIAGNOSIS — Z79.899 OTHER LONG TERM (CURRENT) DRUG THERAPY: ICD-10-CM

## 2018-11-13 PROCEDURE — 99214 OFFICE O/P EST MOD 30 MIN: CPT | Mod: 25

## 2018-11-13 PROCEDURE — 96372 THER/PROPH/DIAG INJ SC/IM: CPT | Mod: 59

## 2018-11-13 RX ORDER — CERTOLIZUMAB PEGOL 400 MG
2 X 200 KIT SUBCUTANEOUS
Qty: 1 | Refills: 0 | Status: COMPLETED | OUTPATIENT
Start: 2018-11-13

## 2018-11-13 RX ADMIN — CERTOLIZUMAB PEGOL 400 MG: KIT at 00:00

## 2018-11-14 ENCOUNTER — OUTPATIENT (OUTPATIENT)
Dept: OUTPATIENT SERVICES | Facility: HOSPITAL | Age: 73
LOS: 1 days | End: 2018-11-14
Payer: MEDICARE

## 2018-11-14 ENCOUNTER — APPOINTMENT (OUTPATIENT)
Dept: CT IMAGING | Facility: CLINIC | Age: 73
End: 2018-11-14
Payer: MEDICARE

## 2018-11-14 DIAGNOSIS — Z98.890 OTHER SPECIFIED POSTPROCEDURAL STATES: Chronic | ICD-10-CM

## 2018-11-14 DIAGNOSIS — T84.093A OTHER MECHANICAL COMPLICATION OF INTERNAL LEFT KNEE PROSTHESIS, INITIAL ENCOUNTER: Chronic | ICD-10-CM

## 2018-11-14 DIAGNOSIS — Z00.8 ENCOUNTER FOR OTHER GENERAL EXAMINATION: ICD-10-CM

## 2018-11-14 DIAGNOSIS — Z96.651 PRESENCE OF RIGHT ARTIFICIAL KNEE JOINT: Chronic | ICD-10-CM

## 2018-11-14 PROCEDURE — 71260 CT THORAX DX C+: CPT | Mod: 26

## 2018-11-14 PROCEDURE — 71260 CT THORAX DX C+: CPT

## 2018-11-14 PROCEDURE — 82565 ASSAY OF CREATININE: CPT

## 2018-11-26 ENCOUNTER — INPATIENT (INPATIENT)
Facility: HOSPITAL | Age: 73
LOS: 3 days | Discharge: HOME | End: 2018-11-30
Attending: FAMILY MEDICINE | Admitting: FAMILY MEDICINE
Payer: MEDICARE

## 2018-11-26 VITALS — WEIGHT: 149.03 LBS | HEIGHT: 68 IN

## 2018-11-26 DIAGNOSIS — T84.093A OTHER MECHANICAL COMPLICATION OF INTERNAL LEFT KNEE PROSTHESIS, INITIAL ENCOUNTER: Chronic | ICD-10-CM

## 2018-11-26 DIAGNOSIS — Z98.890 OTHER SPECIFIED POSTPROCEDURAL STATES: Chronic | ICD-10-CM

## 2018-11-26 DIAGNOSIS — Z96.651 PRESENCE OF RIGHT ARTIFICIAL KNEE JOINT: Chronic | ICD-10-CM

## 2018-11-26 LAB
ADD ON TEST-SPECIMEN IN LAB: SIGNIFICANT CHANGE UP
ALBUMIN SERPL ELPH-MCNC: 3.4 G/DL — SIGNIFICANT CHANGE UP (ref 3.3–5)
ALP SERPL-CCNC: 132 U/L — HIGH (ref 40–120)
ALT FLD-CCNC: 33 U/L — SIGNIFICANT CHANGE UP (ref 12–78)
ANION GAP SERPL CALC-SCNC: 9 MMOL/L — SIGNIFICANT CHANGE UP (ref 5–17)
APTT BLD: 30.6 SEC — SIGNIFICANT CHANGE UP (ref 27.5–36.3)
AST SERPL-CCNC: 40 U/L — HIGH (ref 15–37)
BASOPHILS # BLD AUTO: 0.21 K/UL — HIGH (ref 0–0.2)
BASOPHILS NFR BLD AUTO: 2 % — SIGNIFICANT CHANGE UP (ref 0–2)
BILIRUB SERPL-MCNC: 0.5 MG/DL — SIGNIFICANT CHANGE UP (ref 0.2–1.2)
BUN SERPL-MCNC: 12 MG/DL — SIGNIFICANT CHANGE UP (ref 7–23)
CALCIUM SERPL-MCNC: 8.7 MG/DL — SIGNIFICANT CHANGE UP (ref 8.5–10.1)
CHLORIDE SERPL-SCNC: 109 MMOL/L — HIGH (ref 96–108)
CO2 SERPL-SCNC: 25 MMOL/L — SIGNIFICANT CHANGE UP (ref 22–31)
CREAT SERPL-MCNC: 0.91 MG/DL — SIGNIFICANT CHANGE UP (ref 0.5–1.3)
D DIMER BLD IA.RAPID-MCNC: 273 NG/ML DDU — HIGH
EOSINOPHIL # BLD AUTO: 0.11 K/UL — SIGNIFICANT CHANGE UP (ref 0–0.5)
EOSINOPHIL NFR BLD AUTO: 1 % — SIGNIFICANT CHANGE UP (ref 0–6)
GLUCOSE SERPL-MCNC: 125 MG/DL — HIGH (ref 70–99)
HCT VFR BLD CALC: 33.8 % — LOW (ref 34.5–45)
HGB BLD-MCNC: 11.2 G/DL — LOW (ref 11.5–15.5)
INR BLD: 1.6 RATIO — HIGH (ref 0.88–1.16)
LACTATE SERPL-SCNC: 1.5 MMOL/L — SIGNIFICANT CHANGE UP (ref 0.7–2)
LYMPHOCYTES # BLD AUTO: 3.58 K/UL — HIGH (ref 1–3.3)
LYMPHOCYTES # BLD AUTO: 34 % — SIGNIFICANT CHANGE UP (ref 13–44)
MANUAL SMEAR VERIFICATION: YES — SIGNIFICANT CHANGE UP
MCHC RBC-ENTMCNC: 29.4 PG — SIGNIFICANT CHANGE UP (ref 27–34)
MCHC RBC-ENTMCNC: 33.1 GM/DL — SIGNIFICANT CHANGE UP (ref 32–36)
MCV RBC AUTO: 88.7 FL — SIGNIFICANT CHANGE UP (ref 80–100)
MONOCYTES # BLD AUTO: 1.58 K/UL — HIGH (ref 0–0.9)
MONOCYTES NFR BLD AUTO: 15 % — HIGH (ref 2–14)
NEUTROPHILS # BLD AUTO: 4 K/UL — SIGNIFICANT CHANGE UP (ref 1.8–7.4)
NEUTROPHILS NFR BLD AUTO: 36 % — LOW (ref 43–77)
NEUTS BAND # BLD: 2 % — SIGNIFICANT CHANGE UP (ref 0–8)
NRBC # BLD: 0 /100 — SIGNIFICANT CHANGE UP (ref 0–0)
NRBC # BLD: SIGNIFICANT CHANGE UP /100 WBCS (ref 0–0)
NT-PROBNP SERPL-SCNC: 190 PG/ML — HIGH (ref 0–125)
PLAT MORPH BLD: NORMAL — SIGNIFICANT CHANGE UP
PLATELET # BLD AUTO: 250 K/UL — SIGNIFICANT CHANGE UP (ref 150–400)
PLATELET COUNT - ESTIMATE: NORMAL — SIGNIFICANT CHANGE UP
POTASSIUM SERPL-MCNC: 3.9 MMOL/L — SIGNIFICANT CHANGE UP (ref 3.5–5.3)
POTASSIUM SERPL-SCNC: 3.9 MMOL/L — SIGNIFICANT CHANGE UP (ref 3.5–5.3)
PROT SERPL-MCNC: 6.7 GM/DL — SIGNIFICANT CHANGE UP (ref 6–8.3)
PROTHROM AB SERPL-ACNC: 18 SEC — HIGH (ref 10–12.9)
RAPID RVP RESULT: SIGNIFICANT CHANGE UP
RBC # BLD: 3.81 M/UL — SIGNIFICANT CHANGE UP (ref 3.8–5.2)
RBC # FLD: 14.5 % — SIGNIFICANT CHANGE UP (ref 10.3–14.5)
RBC BLD AUTO: NORMAL — SIGNIFICANT CHANGE UP
SODIUM SERPL-SCNC: 143 MMOL/L — SIGNIFICANT CHANGE UP (ref 135–145)
TROPONIN I SERPL-MCNC: <0.015 NG/ML — SIGNIFICANT CHANGE UP (ref 0.01–0.04)
VARIANT LYMPHS # BLD: 10 % — HIGH (ref 0–6)
WBC # BLD: 10.53 K/UL — HIGH (ref 3.8–10.5)
WBC # FLD AUTO: 10.53 K/UL — HIGH (ref 3.8–10.5)

## 2018-11-26 PROCEDURE — 99285 EMERGENCY DEPT VISIT HI MDM: CPT

## 2018-11-26 PROCEDURE — 93010 ELECTROCARDIOGRAM REPORT: CPT

## 2018-11-26 PROCEDURE — 71045 X-RAY EXAM CHEST 1 VIEW: CPT | Mod: 26

## 2018-11-26 RX ORDER — CEFEPIME 1 G/1
1000 INJECTION, POWDER, FOR SOLUTION INTRAMUSCULAR; INTRAVENOUS ONCE
Qty: 0 | Refills: 0 | Status: DISCONTINUED | OUTPATIENT
Start: 2018-11-26 | End: 2018-11-26

## 2018-11-26 RX ORDER — SODIUM CHLORIDE 9 MG/ML
2000 INJECTION INTRAMUSCULAR; INTRAVENOUS; SUBCUTANEOUS ONCE
Qty: 0 | Refills: 0 | Status: COMPLETED | OUTPATIENT
Start: 2018-11-26 | End: 2018-11-26

## 2018-11-26 RX ORDER — CEFEPIME 1 G/1
1000 INJECTION, POWDER, FOR SOLUTION INTRAMUSCULAR; INTRAVENOUS ONCE
Qty: 0 | Refills: 0 | Status: COMPLETED | OUTPATIENT
Start: 2018-11-26 | End: 2018-11-26

## 2018-11-26 RX ORDER — VANCOMYCIN HCL 1 G
1000 VIAL (EA) INTRAVENOUS ONCE
Qty: 0 | Refills: 0 | Status: COMPLETED | OUTPATIENT
Start: 2018-11-26 | End: 2018-11-26

## 2018-11-26 RX ORDER — AZITHROMYCIN 500 MG/1
500 TABLET, FILM COATED ORAL ONCE
Qty: 0 | Refills: 0 | Status: COMPLETED | OUTPATIENT
Start: 2018-11-26 | End: 2018-11-26

## 2018-11-26 RX ORDER — ACETAMINOPHEN 500 MG
650 TABLET ORAL ONCE
Qty: 0 | Refills: 0 | Status: COMPLETED | OUTPATIENT
Start: 2018-11-26 | End: 2018-11-26

## 2018-11-26 RX ORDER — IPRATROPIUM/ALBUTEROL SULFATE 18-103MCG
3 AEROSOL WITH ADAPTER (GRAM) INHALATION ONCE
Qty: 0 | Refills: 0 | Status: COMPLETED | OUTPATIENT
Start: 2018-11-26 | End: 2018-11-26

## 2018-11-26 RX ADMIN — Medication 1000 MILLIGRAM(S): at 22:45

## 2018-11-26 RX ADMIN — Medication 250 MILLIGRAM(S): at 21:45

## 2018-11-26 RX ADMIN — AZITHROMYCIN 255 MILLIGRAM(S): 500 TABLET, FILM COATED ORAL at 20:06

## 2018-11-26 RX ADMIN — Medication 650 MILLIGRAM(S): at 20:05

## 2018-11-26 RX ADMIN — SODIUM CHLORIDE 2000 MILLILITER(S): 9 INJECTION INTRAMUSCULAR; INTRAVENOUS; SUBCUTANEOUS at 20:05

## 2018-11-26 RX ADMIN — Medication 3 MILLILITER(S): at 20:05

## 2018-11-26 RX ADMIN — AZITHROMYCIN 500 MILLIGRAM(S): 500 TABLET, FILM COATED ORAL at 21:06

## 2018-11-26 RX ADMIN — SODIUM CHLORIDE 2000 MILLILITER(S): 9 INJECTION INTRAMUSCULAR; INTRAVENOUS; SUBCUTANEOUS at 21:06

## 2018-11-26 RX ADMIN — Medication 125 MILLIGRAM(S): at 20:04

## 2018-11-26 RX ADMIN — Medication 650 MILLIGRAM(S): at 20:15

## 2018-11-26 RX ADMIN — CEFEPIME 1000 MILLIGRAM(S): 1 INJECTION, POWDER, FOR SOLUTION INTRAMUSCULAR; INTRAVENOUS at 21:45

## 2018-11-26 NOTE — ED PROVIDER NOTE - PMH
Afib    CVA (cerebral vascular accident)    Dizziness    HTN (hypertension)    RA (rheumatoid arthritis)    Unsteady gait  due to unstable Left knee

## 2018-11-26 NOTE — ED PROVIDER NOTE - ATTENDING CONTRIBUTION TO CARE
Pt eval, treatment and plan contemporaneously with resident [place]. Agree with notes. Collin CEBALLOS Pt eval, treatment and plan contemporaneously with PHILOMENA Olivera. Agree with notes. Collin CEBALLOS

## 2018-11-26 NOTE — ED ADULT TRIAGE NOTE - CHIEF COMPLAINT QUOTE
cough, diff breathing x 1 month worsening today. denies recent travel. hx of afib, stroke 11/1/2017, on eliquis.

## 2018-11-26 NOTE — ED ADULT NURSE NOTE - OBJECTIVE STATEMENT
pt arrives to ED complaining of difficulty breathing "for months." pt states she has history of afib on eliquis, stroke, and rheumatoid arthritis. pt currently taking an immuno suppresant for RA. pt complaining of cough, weakness, and difficulty breathing that has been intermittent for months. pt seen by pulmonologist today where they jerrod labs and took CXR. pt unaware of results. on arrival to ED pt febrile, sepsis workup initiated. o2 sat 87% on room air.

## 2018-11-26 NOTE — ED PROVIDER NOTE - PROGRESS NOTE DETAILS
Pt only uses nebulizer for short amount of time because she starts to cough, doesn't finish treatment. Patient to be admitted for right lung infiltrate and failure of outpatient anitbiotics.  Patient updated on all results, is aware of admission and agreeable.  Case endorsed to Dr. Lashay Olivera PA-C

## 2018-11-26 NOTE — ED PROVIDER NOTE - OBJECTIVE STATEMENT
72 y/o f with PMHx of afib on Eliquis, CVA 11/1/17, HTN, presenting to the ED c/o cough, SOB x1 month, worse today. Denies fever, chills, any other acute c/o. Denies recent travel. 74 y/o f with PMHx of afib on Eliquis, CVA 11/1/17, HTN, presenting to the ED c/o cough, SOB x1 month, worse today. Denies fever, chills, any other acute c/o. Pt seen by PCP, Dr. Franco and prescribed Cefuroxime x2 days ago. Pt seen by Dr. Franco again today and told "you might have a blood clot". Denies recent travel. Former smoker, hasn't smoked in 40 years.

## 2018-11-27 LAB
CRP SERPL-MCNC: 1.25 MG/DL — HIGH (ref 0–0.4)
ERYTHROCYTE [SEDIMENTATION RATE] IN BLOOD: 8 MM/HR — SIGNIFICANT CHANGE UP (ref 0–20)
RHEUMATOID FACT SERPL-ACNC: 34 IU/ML — HIGH (ref 0–13)
TSH SERPL-MCNC: 0.68 UU/ML — SIGNIFICANT CHANGE UP (ref 0.34–4.82)

## 2018-11-27 RX ORDER — FOLIC ACID 0.8 MG
1 TABLET ORAL
Qty: 0 | Refills: 0 | COMMUNITY

## 2018-11-27 RX ORDER — AMLODIPINE BESYLATE 2.5 MG/1
1 TABLET ORAL
Qty: 0 | Refills: 0 | COMMUNITY

## 2018-11-27 RX ORDER — CEFEPIME 1 G/1
2000 INJECTION, POWDER, FOR SOLUTION INTRAMUSCULAR; INTRAVENOUS EVERY 12 HOURS
Qty: 0 | Refills: 0 | Status: DISCONTINUED | OUTPATIENT
Start: 2018-11-27 | End: 2018-11-27

## 2018-11-27 RX ORDER — ASPIRIN/CALCIUM CARB/MAGNESIUM 324 MG
81 TABLET ORAL DAILY
Qty: 0 | Refills: 0 | Status: DISCONTINUED | OUTPATIENT
Start: 2018-11-27 | End: 2018-11-29

## 2018-11-27 RX ORDER — ATORVASTATIN CALCIUM 80 MG/1
1 TABLET, FILM COATED ORAL
Qty: 0 | Refills: 0 | COMMUNITY

## 2018-11-27 RX ORDER — ALBUTEROL 90 UG/1
2 AEROSOL, METERED ORAL
Qty: 0 | Refills: 0 | COMMUNITY

## 2018-11-27 RX ORDER — DICLOFENAC SODIUM 30 MG/G
0 GEL TOPICAL
Qty: 0 | Refills: 0 | COMMUNITY

## 2018-11-27 RX ORDER — APIXABAN 2.5 MG/1
5 TABLET, FILM COATED ORAL EVERY 12 HOURS
Qty: 0 | Refills: 0 | Status: DISCONTINUED | OUTPATIENT
Start: 2018-11-27 | End: 2018-11-29

## 2018-11-27 RX ORDER — CEFEPIME 1 G/1
INJECTION, POWDER, FOR SOLUTION INTRAMUSCULAR; INTRAVENOUS
Qty: 0 | Refills: 0 | Status: DISCONTINUED | OUTPATIENT
Start: 2018-11-27 | End: 2018-11-27

## 2018-11-27 RX ORDER — IPRATROPIUM/ALBUTEROL SULFATE 18-103MCG
3 AEROSOL WITH ADAPTER (GRAM) INHALATION EVERY 6 HOURS
Qty: 0 | Refills: 0 | Status: DISCONTINUED | OUTPATIENT
Start: 2018-11-27 | End: 2018-11-29

## 2018-11-27 RX ORDER — CHOLECALCIFEROL (VITAMIN D3) 125 MCG
1 CAPSULE ORAL
Qty: 0 | Refills: 0 | COMMUNITY

## 2018-11-27 RX ORDER — CALCIUM CARBONATE 500(1250)
1 TABLET ORAL
Qty: 0 | Refills: 0 | COMMUNITY

## 2018-11-27 RX ORDER — PREGABALIN 225 MG/1
1 CAPSULE ORAL
Qty: 0 | Refills: 0 | COMMUNITY

## 2018-11-27 RX ORDER — CEFEPIME 1 G/1
1000 INJECTION, POWDER, FOR SOLUTION INTRAMUSCULAR; INTRAVENOUS ONCE
Qty: 0 | Refills: 0 | Status: COMPLETED | OUTPATIENT
Start: 2018-11-27 | End: 2018-11-27

## 2018-11-27 RX ORDER — CEFEPIME 1 G/1
2000 INJECTION, POWDER, FOR SOLUTION INTRAMUSCULAR; INTRAVENOUS EVERY 12 HOURS
Qty: 0 | Refills: 0 | Status: DISCONTINUED | OUTPATIENT
Start: 2018-11-27 | End: 2018-11-29

## 2018-11-27 RX ORDER — PREGABALIN 225 MG/1
1000 CAPSULE ORAL DAILY
Qty: 0 | Refills: 0 | Status: DISCONTINUED | OUTPATIENT
Start: 2018-11-27 | End: 2018-11-29

## 2018-11-27 RX ORDER — VITAMIN E 100 UNIT
0 CAPSULE ORAL
Qty: 0 | Refills: 0 | COMMUNITY

## 2018-11-27 RX ORDER — RANITIDINE HYDROCHLORIDE 150 MG/1
1 TABLET, FILM COATED ORAL
Qty: 0 | Refills: 0 | COMMUNITY

## 2018-11-27 RX ORDER — AZITHROMYCIN 500 MG/1
500 TABLET, FILM COATED ORAL EVERY 24 HOURS
Qty: 0 | Refills: 0 | Status: DISCONTINUED | OUTPATIENT
Start: 2018-11-27 | End: 2018-11-29

## 2018-11-27 RX ORDER — GABAPENTIN 400 MG/1
1 CAPSULE ORAL
Qty: 0 | Refills: 0 | COMMUNITY

## 2018-11-27 RX ORDER — AMLODIPINE BESYLATE 2.5 MG/1
10 TABLET ORAL DAILY
Qty: 0 | Refills: 0 | Status: DISCONTINUED | OUTPATIENT
Start: 2018-11-27 | End: 2018-11-29

## 2018-11-27 RX ORDER — HYDROXYCHLOROQUINE SULFATE 200 MG
1 TABLET ORAL
Qty: 0 | Refills: 0 | COMMUNITY

## 2018-11-27 RX ORDER — DICLOFENAC SODIUM 30 MG/G
1 GEL TOPICAL
Qty: 0 | Refills: 0 | COMMUNITY

## 2018-11-27 RX ORDER — FOLIC ACID 0.8 MG
1 TABLET ORAL DAILY
Qty: 0 | Refills: 0 | Status: DISCONTINUED | OUTPATIENT
Start: 2018-11-27 | End: 2018-11-29

## 2018-11-27 RX ORDER — DOCUSATE SODIUM 100 MG
100 CAPSULE ORAL THREE TIMES A DAY
Qty: 0 | Refills: 0 | Status: DISCONTINUED | OUTPATIENT
Start: 2018-11-27 | End: 2018-11-29

## 2018-11-27 RX ORDER — CALCIUM CARBONATE 500(1250)
1 TABLET ORAL DAILY
Qty: 0 | Refills: 0 | Status: DISCONTINUED | OUTPATIENT
Start: 2018-11-27 | End: 2018-11-29

## 2018-11-27 RX ORDER — ENOXAPARIN SODIUM 100 MG/ML
40 INJECTION SUBCUTANEOUS EVERY 24 HOURS
Qty: 0 | Refills: 0 | Status: DISCONTINUED | OUTPATIENT
Start: 2018-11-27 | End: 2018-11-27

## 2018-11-27 RX ORDER — ACETAMINOPHEN 500 MG
650 TABLET ORAL EVERY 6 HOURS
Qty: 0 | Refills: 0 | Status: DISCONTINUED | OUTPATIENT
Start: 2018-11-27 | End: 2018-11-29

## 2018-11-27 RX ORDER — APIXABAN 2.5 MG/1
1 TABLET, FILM COATED ORAL
Qty: 0 | Refills: 0 | COMMUNITY

## 2018-11-27 RX ORDER — CEFEPIME 1 G/1
1000 INJECTION, POWDER, FOR SOLUTION INTRAMUSCULAR; INTRAVENOUS EVERY 12 HOURS
Qty: 0 | Refills: 0 | Status: DISCONTINUED | OUTPATIENT
Start: 2018-11-27 | End: 2018-11-27

## 2018-11-27 RX ORDER — ATORVASTATIN CALCIUM 80 MG/1
40 TABLET, FILM COATED ORAL AT BEDTIME
Qty: 0 | Refills: 0 | Status: DISCONTINUED | OUTPATIENT
Start: 2018-11-27 | End: 2018-11-29

## 2018-11-27 RX ORDER — VITAMIN E 100 UNIT
400 CAPSULE ORAL ONCE
Qty: 0 | Refills: 0 | Status: COMPLETED | OUTPATIENT
Start: 2018-11-27 | End: 2018-11-27

## 2018-11-27 RX ORDER — CHOLECALCIFEROL (VITAMIN D3) 125 MCG
2000 CAPSULE ORAL DAILY
Qty: 0 | Refills: 0 | Status: DISCONTINUED | OUTPATIENT
Start: 2018-11-27 | End: 2018-11-29

## 2018-11-27 RX ORDER — ONDANSETRON 8 MG/1
4 TABLET, FILM COATED ORAL EVERY 6 HOURS
Qty: 0 | Refills: 0 | Status: DISCONTINUED | OUTPATIENT
Start: 2018-11-27 | End: 2018-11-29

## 2018-11-27 RX ADMIN — Medication 2000 UNIT(S): at 12:39

## 2018-11-27 RX ADMIN — Medication 40 MILLIGRAM(S): at 17:19

## 2018-11-27 RX ADMIN — APIXABAN 5 MILLIGRAM(S): 2.5 TABLET, FILM COATED ORAL at 14:55

## 2018-11-27 RX ADMIN — AZITHROMYCIN 255 MILLIGRAM(S): 500 TABLET, FILM COATED ORAL at 14:55

## 2018-11-27 RX ADMIN — Medication 1 TABLET(S): at 12:39

## 2018-11-27 RX ADMIN — Medication 1 MILLIGRAM(S): at 12:39

## 2018-11-27 RX ADMIN — AMLODIPINE BESYLATE 10 MILLIGRAM(S): 2.5 TABLET ORAL at 12:39

## 2018-11-27 RX ADMIN — Medication 100 MILLIGRAM(S): at 12:39

## 2018-11-27 RX ADMIN — Medication 3 MILLILITER(S): at 21:02

## 2018-11-27 RX ADMIN — Medication 1 TABLET(S): at 12:40

## 2018-11-27 RX ADMIN — PREGABALIN 1000 MICROGRAM(S): 225 CAPSULE ORAL at 12:40

## 2018-11-27 RX ADMIN — Medication 40 MILLIGRAM(S): at 09:56

## 2018-11-27 RX ADMIN — Medication 3 MILLILITER(S): at 13:48

## 2018-11-27 RX ADMIN — Medication 1200 MILLIGRAM(S): at 17:19

## 2018-11-27 RX ADMIN — CEFEPIME 100 MILLIGRAM(S): 1 INJECTION, POWDER, FOR SOLUTION INTRAMUSCULAR; INTRAVENOUS at 17:18

## 2018-11-27 RX ADMIN — Medication 400 INTERNATIONAL UNIT(S): at 12:40

## 2018-11-27 RX ADMIN — ATORVASTATIN CALCIUM 40 MILLIGRAM(S): 80 TABLET, FILM COATED ORAL at 21:21

## 2018-11-27 RX ADMIN — CEFEPIME 1000 MILLIGRAM(S): 1 INJECTION, POWDER, FOR SOLUTION INTRAMUSCULAR; INTRAVENOUS at 09:56

## 2018-11-27 RX ADMIN — Medication 81 MILLIGRAM(S): at 12:39

## 2018-11-27 RX ADMIN — APIXABAN 5 MILLIGRAM(S): 2.5 TABLET, FILM COATED ORAL at 21:21

## 2018-11-27 NOTE — H&P ADULT - NSHPPHYSICALEXAM_GEN_ALL_CORE
General: Well developed; well nourished; in no acute distress  Eyes: PERRLA, EOMI; conjunctiva and sclera clear  Head: Normocephalic; atraumatic  ENMT: No nasal discharge; airway clear  Neck: Supple; non tender; no masses  Respiratory:  B/L crackles with decreased BS at RLL   Cardiovascular: Regular rate and rhythm. S1 and S2 Normal; No murmurs  Gastrointestinal: Soft non-tender non-distended; Normal bowel sounds  Genitourinary: No suprapubic tenderness    Extremities: Normal range of motion, No  edema  Vascular: Peripheral pulses palpable 2+ bilaterally  Neurological: Alert and oriented x4, non focal   Skin: Warm and dry. No acute rash  Lymph Nodes: No acute cervical adenopathy  Musculoskeletal: Normal muscle tone, without deformities  Psychiatric: Cooperative and appropriate

## 2018-11-27 NOTE — CONSULT NOTE ADULT - SUBJECTIVE AND OBJECTIVE BOX
Patient is a 73y old  Female who presents with a chief complaint of SOB, cough (2018 08:55)    HPI:  73 y.o with PMH of RA on Cimzia, CVA,  s/p B/l TKR,  HTN, HLD, fatty liver, pancreatic lesion presented to ED on  c/o difficulty breathing and cough which first started about 1 month ago and got worse, symptoms associated with feeling cold and chills at night, decreased appetite and lost weight (3lbs). No fevers. Was seen by PCP and started on Ceftin, but unable to take it d/t nausea/decreased PO intake. Also was seen by Dr Nieves and had CT angio done, which was neg for PE, but R lung changes concerning for multifocal pna. Pt states that previously was on MTX, but now gets Cimzia monthly - last injection about 3 weeks ago. In ED: hypoxic 87% RA, T 100, wbc ct 10.5, RVP (-) CXR: R sided infiltrates concerning for PNA was given IV vanco/cefepime/azitho/IV solumedrol.      PMH: as above  PSH: as above  Meds: per reconciliation sheet, noted below  MEDICATIONS  (STANDING):  ALBUTerol/ipratropium for Nebulization 3 milliLiter(s) Nebulizer every 6 hours  amLODIPine   Tablet 10 milliGRAM(s) Oral daily  aspirin  chewable 81 milliGRAM(s) Oral daily  atorvastatin 40 milliGRAM(s) Oral at bedtime  azithromycin  IVPB 500 milliGRAM(s) IV Intermittent every 24 hours  calcium carbonate    500 mG (Tums) Chewable 1 Tablet(s) Chew daily  cefepime  Injectable. 2000 milliGRAM(s) IV Push every 12 hours  cholecalciferol 2000 Unit(s) Oral daily  cyanocobalamin 1000 MICROGram(s) Oral daily  enoxaparin Injectable 40 milliGRAM(s) SubCutaneous every 24 hours  folic acid 1 milliGRAM(s) Oral daily  guaiFENesin ER 1200 milliGRAM(s) Oral every 12 hours  methylPREDNISolone sodium succinate Injectable 40 milliGRAM(s) IV Push two times a day  multivitamin 1 Tablet(s) Oral daily  vitamin E 400 International Unit(s) Oral once      Allergies    No Known Allergies    Intolerances      Social: no smoking, no alcohol, no illegal drugs; no recent travel, no exposure to TB  FAMILY HISTORY:  Family history of heart disease (Mother)  Family history of lung cancer (Father)     no history of premature cardiovascular disease in first degree relatives    ROS: no HA, no dizziness, no sore throat, no blurry vision, no CP, no palpitations,  no abdominal pain, no diarrhea, no N/V, no dysuria, no leg pain, no claudication, no rash, no joint aches, no rectal pain or bleeding, no night sweats  All other systems reviewed and are negative    Vital Signs Last 24 Hrs  T(C): 36.4 (2018 10:17), Max: 37.8 (2018 19:34)  T(F): 97.5 (2018 10:17), Max: 100 (2018 19:34)  HR: 86 (2018 10:17) (79 - 93)  BP: 114/66 (2018 10:17) (106/71 - 125/70)  BP(mean): --  RR: 20 (2018 10:17) (17 - 20)  SpO2: 95% (2018 10:17) (87% - 97%)  Daily Height in cm: 172.72 (2018 19:14)    Daily Weight in k (2018 10:17)    PE:  Constitutional: frail looking  HEENT: NC/AT, EOMI, PERRLA, conjunctivae clear; ears and nose atraumatic; pharynx benign  Neck: supple; thyroid not palpable  Back: no tenderness  Respiratory: decreased breath sounds, R lung with rhonchi/rales  Cardiovascular: S1S2 regular, no murmurs  Abdomen: soft, not tender, not distended, positive BS; liver and spleen WNL  Genitourinary: no suprapubic tenderness  Lymphatic: no LN palpable  Musculoskeletal: no muscle tenderness, no joint swelling or tenderness  Extremities: no pedal edema  Neurological/ Psychiatric: moving all extremities  Skin: no rashes; no palpable lesions    Labs: all available labs reviewed                        11.2   10.53 )-----------( 250      ( 2018 19:49 )             33.8         143  |  109<H>  |  12  ----------------------------<  125<H>  3.9   |  25  |  0.91    Ca    8.7      2018 19:49    TPro  6.7  /  Alb  3.4  /  TBili  0.5  /  DBili  x   /  AST  40<H>  /  ALT  33  /  AlkPhos  132<H>  11-     LIVER FUNCTIONS - ( 2018 19:49 )  Alb: 3.4 g/dL / Pro: 6.7 gm/dL / ALK PHOS: 132 U/L / ALT: 33 U/L / AST: 40 U/L / GGT: x                 Radiology: all available radiological tests reviewed        Advanced directives addressed: full resuscitation

## 2018-11-27 NOTE — CONSULT NOTE ADULT - ASSESSMENT
1) Interstitial Lung Disease  2) Abnormal CT Chest  3) Diffuse Ground Glass Opacities   4) Dyspnea  5) Hypoxemic Respiratory Failure  6) Rheumatoid Arthritis    73 y.o with PMH of RA on Cimzia, CVA,  s/p B/l TKR,  HTN, HLD, Fatty liver, pancreatic lesion presented to ED c/o difficulty breathing and cough which first started about 1 month ago and got worse, symptoms associated with  feeling cold and chills at night, decreased appetite and lost weight ( 3lbs).  No fevers. Was seen by PCP and started on Ceftin, but was so nauseous, couldn't take it yesterday.   Patient was seen by me in the office on November 26 I performed a CT PE because her oxygen saturation was hovering back and forth I had a low suspicion for pulmonary embolism she also had inspiratory crackles at the bases which could've been suggestive of pulmonary fibrosis so wanted to visualize the parenchyma further.  fortunately CT PE was negative.  However, it shows new moderate geographic ground glass opacities and septal thickening in all lobes with mid to upper lung predominance or pronounced within the parenchyma appearance is nonspecific but being that this is so acute and it was not present 2 weeks ago on her previous CT chest in atypical infection or drug-induced pneumonia is quite possible  Will perform bronchoscopy on Thursday @8AM.  Discussed case with Dr. Mcgill  Appreciate ID recommendations/Rheumatology recommendations.

## 2018-11-27 NOTE — H&P ADULT - HISTORY OF PRESENT ILLNESS
73 y.o with PMH of RA on Cimzia, CVA,  s/p B/l TKR,  HTN, HLD, Fatty liver, pancreatic lesion presented to ED c/o difficulty breathing and cough which first started about 1 month ago and got worse, symptoms associated with  feeling cold and chills at night, decreased appetite and lost weight ( 3lbs).  No fevers. Was seen by PCP and started on Ceftin, but was so nauseous, couldn't take it yesterday. Also was seen by Dr Nieves and had CT angio done, which was neg for PE, but + PNA ( as per D/w DR Nieves).  Pt states that previously was on MTX, but now gets Cimzia monthly. Pt denies diarrhea, no Vomiting, states felt lightheaded at home but feels better after IVF    In ED: Low grade fever, mild Leukocytosis. CXR: + RLL infiltrate. Was given, Vanco and Cefepime.  IVF 2L bolus. Solumedrol 125mg x 1

## 2018-11-27 NOTE — CONSULT NOTE ADULT - ASSESSMENT
73 y.o with PMH of RA on Cimzia, CVA,  s/p B/l TKR,  HTN, HLD, fatty liver, pancreatic lesion presented to ED on 11/26 c/o difficulty breathing and cough which first started about 1 month ago and got worse, symptoms associated with feeling cold and chills at night, decreased appetite and lost weight (3lbs). No fevers. Was seen by PCP and started on Ceftin, but unable to take it d/t nausea/decreased PO intake. Also was seen by Dr Nieves and had CT angio done, which was neg for PE, but R lung changes concerning for multifocal pna. Pt states that previously was on MTX, but now gets Cimzia monthly - last injection about 3 weeks ago. In ED: hypoxic 87% RA, T 100, wbc ct 10.5, RVP (-) CXR: R sided infiltrates concerning for PNA was given IV vanco/cefepime/azitho/IV solumedrol.    1. hypoxic resp failure/R multifocal PNA/pneumonitis/immunocompromised host  - agree with cefepime [id number] for mssa/strep/gnr resistant coverage   - on azithromycin 500mg daily for atypical coverage   - check legionella ag/sputum cx/mycoplasma IgM  - f/u cultures   - monitor temps  - on inhalers/steroids per pulm  - tolerating abx well so far; no side effects noted  - reason for abx use and side effects reviewed with patient  - supportive care  - f/u cbc    2. other issues - care per medicine

## 2018-11-27 NOTE — H&P ADULT - ASSESSMENT
73 y.o with PMH of RA on Cimzia, CVA,  s/p B/l TKR,  HTN, HLD, Fatty liver, pancreatic lesion admitted for:    1. SOB and cough, likely due to RLL PNA, immunosuppressed host  CXR reviewed, D/w Dr Nieves, Pt had CT angio outPt and was neg for PE, + RLL opacity  admit to med-surg  Monitor Pulse ox, supplement O2 Will check ESR/CRP, RF, TSH   S/p VAnco and Cefepime in ED, c/w Cefepime and Azithromycin  C/w Solumedrol, Neb Tx, Mucinex   D/w DR Nieves  ID eval        2. RA, s/p b/l TKR  Will Hold Cimzia for now   Tylenol for pain  PT       3. H/o CVA  C/w ASA and Lipitor     4. Fatty Liver, Pancreatic lesion   ongoing evaluation with GI Dr Weber, reports had MRI recently       5. DVT PPxs

## 2018-11-27 NOTE — CONSULT NOTE ADULT - SUBJECTIVE AND OBJECTIVE BOX
Patient is a 73y old  Female who presents with a chief complaint of SOB, cough (27 Nov 2018 11:59)      HPI:  73 y.o with PMH of RA on Cimzia, CVA,  s/p B/l TKR,  HTN, HLD, Fatty liver, pancreatic lesion presented to ED c/o difficulty breathing and cough which first started about 1 month ago and got worse, symptoms associated with  feeling cold and chills at night, decreased appetite and lost weight ( 3lbs).  No fevers. Was seen by PCP and started on Ceftin, but was so nauseous, couldn't take it yesterday. Also was seen by Dr Nieves and had CT angio done, which was neg for PE, but + PNA ( as per D/w DR Nieves).  Pt states that previously was on MTX, but now gets Cimzia monthly. Pt denies diarrhea, no Vomiting, states felt lightheaded at home but feels better after IVF    In ED: Low grade fever, mild Leukocytosis. CXR: + RLL infiltrate. Was given, Vanco and Cefepime.  IVF 2L bolus. 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  H/O cataract extraction, right: 1/2015      PREVIOUS DIAGNOSTIC TESTING:      MEDICATIONS  (STANDING):  ALBUTerol/ipratropium for Nebulization 3 milliLiter(s) Nebulizer every 6 hours  amLODIPine   Tablet 10 milliGRAM(s) Oral daily  apixaban 5 milliGRAM(s) Oral every 12 hours  aspirin  chewable 81 milliGRAM(s) Oral daily  atorvastatin 40 milliGRAM(s) Oral at bedtime  azithromycin  IVPB 500 milliGRAM(s) IV Intermittent every 24 hours  calcium carbonate    500 mG (Tums) Chewable 1 Tablet(s) Chew daily  cefepime   IVPB 2000 milliGRAM(s) IV Intermittent every 12 hours  cholecalciferol 2000 Unit(s) Oral daily  cyanocobalamin 1000 MICROGram(s) Oral daily  folic acid 1 milliGRAM(s) Oral daily  guaiFENesin ER 1200 milliGRAM(s) Oral every 12 hours  methylPREDNISolone sodium succinate Injectable 40 milliGRAM(s) IV Push two times a day  multivitamin 1 Tablet(s) Oral daily    MEDICATIONS  (PRN):  acetaminophen   Tablet .. 650 milliGRAM(s) Oral every 6 hours PRN Temp greater or equal to 38C (100.4F), Mild Pain (1 - 3)  bisacodyl 5 milliGRAM(s) Oral daily PRN Constipation  docusate sodium 100 milliGRAM(s) Oral three times a day PRN Constipation  ondansetron Injectable 4 milliGRAM(s) IV Push every 6 hours PRN Nausea      FAMILY HISTORY:  Family history of heart disease (Mother)  Family history of lung cancer (Father)      SOCIAL HISTORY:  ***    REVIEW OF SYSTEM:  dyspnea, cough, fatigue, otherwise 12 point ROS negative   Vital Signs Last 24 Hrs  T(C): 36.4 (27 Nov 2018 10:17), Max: 37.8 (26 Nov 2018 19:34)  T(F): 97.5 (27 Nov 2018 10:17), Max: 100 (26 Nov 2018 19:34)  HR: 86 (27 Nov 2018 10:17) (79 - 93)  BP: 114/66 (27 Nov 2018 10:17) (106/71 - 125/70)  BP(mean): --  RR: 20 (27 Nov 2018 10:17) (17 - 20)  SpO2: 95% (27 Nov 2018 10:17) (87% - 97%)    I&O's Summary    PHYSICAL EXAM  General Appearance: cooperative, no acute distress,   HEENT: PERRL, conjunctiva clear, EOM's intact, non injected pharynx, no exudate, TM   normal  Neck: Supple, , no adenopathy, thyroid: not enlarged, no carotid bruit or JVD  Back: Symmetric, no  tenderness,no soft tissue tenderness  Lungs: Inspiratory rales bilaterally   Heart: Regular rate and rhythm, S1, S2 normal, no murmur, rub or gallop  Abdomen: Soft, non-tender, bowel sounds active , no hepatosplenomegaly  Extremities: no cyanosis or edema, no joint swelling  Skin: Skin color, texture normal, no rashes   Neurologic: Alert and oriented X3 , cranial nerves intact, sensory and motor normal,    ECG:    LABS:                          11.2   10.53 )-----------( 250      ( 26 Nov 2018 19:49 )             33.8     11-26    143  |  109<H>  |  12  ----------------------------<  125<H>  3.9   |  25  |  0.91    Ca    8.7      26 Nov 2018 19:49    TPro  6.7  /  Alb  3.4  /  TBili  0.5  /  DBili  x   /  AST  40<H>  /  ALT  33  /  AlkPhos  132<H>  11-26    CARDIAC MARKERS ( 26 Nov 2018 19:49 )  <0.015 ng/mL / x     / x     / x     / x            Pro BNP  190 11-26 @ 19:49  D Dimer  273 11-26 @ 19:49    PT/INR - ( 26 Nov 2018 19:49 )   PT: 18.0 sec;   INR: 1.60 ratio         PTT - ( 26 Nov 2018 19:49 )  PTT:30.6 sec          RADIOLOGY & ADDITIONAL STUDIES:    CT CHEST 11/26/18    No pulmonary emboli. Probable LVH. Atherosclerotic disease. Left anterior chest wall loop recorder device.     New moderate geographic groundglass opacities and septal thickening in all lobes with mid to upper lung predominance (right greater than left), more pronounced within the lung periphery. The appearances are nonspecific, however, possible diagnostic considerations would include atypical infection, drug reaction, acute interstitial pneumonia. The peripheral distribution and lack of effusions would be atypical for cardiogenic pulmonary edema. Suggest correlation with infectious screen, short interval follow-up chest imaging, and consideration of bronchoscopy/BAL (if clinically appropriate).     Few small (4 mm or less) solid nodules in both lungs, likely benign. If the patient is considered high risk, a reduced dose follow-up chest CT could be performed in 12 months time to reassess.     Subcentimeter and mildly enlarged mediastinal lymph nodes, likely reactive.     Small sliding hiatal hernia.

## 2018-11-27 NOTE — H&P ADULT - PMH
Afib    CVA (cerebral vascular accident)    Dizziness    Fatty liver    HTN (hypertension)    Pancreatic lesion    RA (rheumatoid arthritis)    Unsteady gait  due to unstable Left knee

## 2018-11-28 LAB
ANION GAP SERPL CALC-SCNC: 9 MMOL/L — SIGNIFICANT CHANGE UP (ref 5–17)
BASOPHILS # BLD AUTO: 0.03 K/UL — SIGNIFICANT CHANGE UP (ref 0–0.2)
BASOPHILS NFR BLD AUTO: 0.2 % — SIGNIFICANT CHANGE UP (ref 0–2)
BUN SERPL-MCNC: 15 MG/DL — SIGNIFICANT CHANGE UP (ref 7–23)
CALCIUM SERPL-MCNC: 8.4 MG/DL — LOW (ref 8.5–10.1)
CHLORIDE SERPL-SCNC: 113 MMOL/L — HIGH (ref 96–108)
CO2 SERPL-SCNC: 23 MMOL/L — SIGNIFICANT CHANGE UP (ref 22–31)
CREAT SERPL-MCNC: 0.65 MG/DL — SIGNIFICANT CHANGE UP (ref 0.5–1.3)
EOSINOPHIL # BLD AUTO: 0 K/UL — SIGNIFICANT CHANGE UP (ref 0–0.5)
EOSINOPHIL NFR BLD AUTO: 0 % — SIGNIFICANT CHANGE UP (ref 0–6)
GLUCOSE SERPL-MCNC: 148 MG/DL — HIGH (ref 70–99)
HCT VFR BLD CALC: 30.9 % — LOW (ref 34.5–45)
HGB BLD-MCNC: 10 G/DL — LOW (ref 11.5–15.5)
IMM GRANULOCYTES NFR BLD AUTO: 0.6 % — SIGNIFICANT CHANGE UP (ref 0–1.5)
LYMPHOCYTES # BLD AUTO: 16.7 % — SIGNIFICANT CHANGE UP (ref 13–44)
LYMPHOCYTES # BLD AUTO: 2.44 K/UL — SIGNIFICANT CHANGE UP (ref 1–3.3)
MCHC RBC-ENTMCNC: 28.9 PG — SIGNIFICANT CHANGE UP (ref 27–34)
MCHC RBC-ENTMCNC: 32.4 GM/DL — SIGNIFICANT CHANGE UP (ref 32–36)
MCV RBC AUTO: 89.3 FL — SIGNIFICANT CHANGE UP (ref 80–100)
MONOCYTES # BLD AUTO: 1.22 K/UL — HIGH (ref 0–0.9)
MONOCYTES NFR BLD AUTO: 8.3 % — SIGNIFICANT CHANGE UP (ref 2–14)
NEUTROPHILS # BLD AUTO: 10.86 K/UL — HIGH (ref 1.8–7.4)
NEUTROPHILS NFR BLD AUTO: 74.2 % — SIGNIFICANT CHANGE UP (ref 43–77)
NRBC # BLD: 0 /100 WBCS — SIGNIFICANT CHANGE UP (ref 0–0)
PLATELET # BLD AUTO: 249 K/UL — SIGNIFICANT CHANGE UP (ref 150–400)
POTASSIUM SERPL-MCNC: 4.1 MMOL/L — SIGNIFICANT CHANGE UP (ref 3.5–5.3)
POTASSIUM SERPL-SCNC: 4.1 MMOL/L — SIGNIFICANT CHANGE UP (ref 3.5–5.3)
RBC # BLD: 3.46 M/UL — LOW (ref 3.8–5.2)
RBC # FLD: 14.5 % — SIGNIFICANT CHANGE UP (ref 10.3–14.5)
SODIUM SERPL-SCNC: 145 MMOL/L — SIGNIFICANT CHANGE UP (ref 135–145)
T3 SERPL-MCNC: 92 NG/DL — SIGNIFICANT CHANGE UP (ref 80–200)
T4 AB SER-ACNC: 7.4 UG/DL — SIGNIFICANT CHANGE UP (ref 4.6–12)
WBC # BLD: 14.64 K/UL — HIGH (ref 3.8–10.5)
WBC # FLD AUTO: 14.64 K/UL — HIGH (ref 3.8–10.5)

## 2018-11-28 PROCEDURE — 99222 1ST HOSP IP/OBS MODERATE 55: CPT

## 2018-11-28 RX ORDER — LANOLIN ALCOHOL/MO/W.PET/CERES
3 CREAM (GRAM) TOPICAL ONCE
Qty: 0 | Refills: 0 | Status: COMPLETED | OUTPATIENT
Start: 2018-11-28 | End: 2018-11-28

## 2018-11-28 RX ADMIN — Medication 81 MILLIGRAM(S): at 12:25

## 2018-11-28 RX ADMIN — Medication 40 MILLIGRAM(S): at 05:29

## 2018-11-28 RX ADMIN — Medication 1 TABLET(S): at 12:25

## 2018-11-28 RX ADMIN — Medication 100 MILLIGRAM(S): at 16:53

## 2018-11-28 RX ADMIN — Medication 3 MILLILITER(S): at 14:02

## 2018-11-28 RX ADMIN — Medication 2000 UNIT(S): at 12:25

## 2018-11-28 RX ADMIN — CEFEPIME 100 MILLIGRAM(S): 1 INJECTION, POWDER, FOR SOLUTION INTRAMUSCULAR; INTRAVENOUS at 18:28

## 2018-11-28 RX ADMIN — APIXABAN 5 MILLIGRAM(S): 2.5 TABLET, FILM COATED ORAL at 12:25

## 2018-11-28 RX ADMIN — Medication 3 MILLIGRAM(S): at 01:53

## 2018-11-28 RX ADMIN — Medication 1 MILLIGRAM(S): at 12:25

## 2018-11-28 RX ADMIN — PREGABALIN 1000 MICROGRAM(S): 225 CAPSULE ORAL at 12:25

## 2018-11-28 RX ADMIN — Medication 3 MILLILITER(S): at 09:32

## 2018-11-28 RX ADMIN — AZITHROMYCIN 255 MILLIGRAM(S): 500 TABLET, FILM COATED ORAL at 16:32

## 2018-11-28 RX ADMIN — Medication 40 MILLIGRAM(S): at 18:29

## 2018-11-28 RX ADMIN — Medication 1200 MILLIGRAM(S): at 05:09

## 2018-11-28 RX ADMIN — APIXABAN 5 MILLIGRAM(S): 2.5 TABLET, FILM COATED ORAL at 18:29

## 2018-11-28 RX ADMIN — Medication 1200 MILLIGRAM(S): at 18:29

## 2018-11-28 RX ADMIN — ATORVASTATIN CALCIUM 40 MILLIGRAM(S): 80 TABLET, FILM COATED ORAL at 21:38

## 2018-11-28 RX ADMIN — CEFEPIME 100 MILLIGRAM(S): 1 INJECTION, POWDER, FOR SOLUTION INTRAMUSCULAR; INTRAVENOUS at 05:09

## 2018-11-28 RX ADMIN — AMLODIPINE BESYLATE 10 MILLIGRAM(S): 2.5 TABLET ORAL at 05:29

## 2018-11-28 RX ADMIN — Medication 100 MILLIGRAM(S): at 21:38

## 2018-11-28 NOTE — CONSULT NOTE ADULT - SUBJECTIVE AND OBJECTIVE BOX
REQUESTING PHYSICIAN: Dr. Srivastava    REASON FOR CONSULT: RA    CHIEF COMPLAINT: SOB    HPI:  73 y.o with PMH of RA on Cimzia, CVA,  s/p B/l TKR,  HTN, HLD, Fatty liver, pancreatic lesion presented to ED c/o difficulty breathing and cough which first started about 1 month ago and got worse, symptoms associated with  feeling cold and chills at night, decreased appetite and lost weight ( 3lbs).  No fevers. Was seen by PCP and started on Ceftin, but was so nauseous, couldn't take it yesterday. Also was seen by Dr Nieves and had CT angio done, which was neg for PE, but + PNA ( as per D/w DR Nieves).  Pt states that previously was on MTX, but now gets Cimzia monthly. Pt denies diarrhea, no Vomiting, states felt lightheaded at home but feels better after IVF    In ED: Low grade fever, mild Leukocytosis. CXR: + RLL infiltrate. Was given, Vanco and Cefepime.  IVF 2L bolus. Solumedrol 125mg x 1 (27 Nov 2018 08:55)  Alda is well known to me. I see her as an outpatient. She was on a combination of methotrexate and cimzia. We stopped the methotrexate about 2 weeks ago after a chest x-ray showed emphysema. This was followed by a CT chest that showed features of emphysema. We have continued with the cimzia she usually receives monthly. In the past she has been steroid dependent.    Past medical history-as above  Past surgical history-reviewed  Allergies-reviewed  Social history-reviewed  -Family history-noncontributory    REVIEW OF SYSTEMS:    CONSTITUTIONAL: No weakness, fevers or chills  EYES/ENT: No visual changes;  No vertigo or throat pain   NECK: No pain or stiffness  RESPIRATORY: + cough + sOB  CARDIOVASCULAR: No chest pain or palpitations  GASTROINTESTINAL: No abdominal or epigastric pain. No nausea, vomiting, or hematemesis; No diarrhea or constipation. No melena or hematochezia.  GENITOURINARY: No dysuria, frequency or hematuria  NEUROLOGICAL: No numbness or weakness  SKIN: No itching, burning, rashes, or lesions   All other review of systems is negative unless indicated above    Vital Signs Last 24 Hrs  T(C): 36.4 (28 Nov 2018 19:49), Max: 36.8 (28 Nov 2018 12:17)  T(F): 97.5 (28 Nov 2018 19:49), Max: 98.2 (28 Nov 2018 12:17)  HR: 86 (28 Nov 2018 19:49) (85 - 97)  BP: 125/66 (28 Nov 2018 19:49) (119/66 - 129/69)  BP(mean): --  RR: 18 (28 Nov 2018 19:49) (18 - 18)  SpO2: 95% (28 Nov 2018 19:49) (95% - 97%)    I&O's Summary      CAPILLARY BLOOD GLUCOSE          PHYSICAL EXAM:    HEENT- no oral lesions noted, no lymphadenoapthy noted  Heart- S1 S2 reg  Lungs- CTA b/l  Abd- Soft NT  Ext- no edema  Skin- no rashes  Musculoskelatal- FROM all joints, no active synovitis noted  Neurological- intact strength upper and lower extremities    MEDICATIONS:  MEDICATIONS  (STANDING):  ALBUTerol/ipratropium for Nebulization 3 milliLiter(s) Nebulizer every 6 hours  amLODIPine   Tablet 10 milliGRAM(s) Oral daily  apixaban 5 milliGRAM(s) Oral every 12 hours  aspirin  chewable 81 milliGRAM(s) Oral daily  atorvastatin 40 milliGRAM(s) Oral at bedtime  azithromycin  IVPB 500 milliGRAM(s) IV Intermittent every 24 hours  benzonatate 100 milliGRAM(s) Oral three times a day  calcium carbonate    500 mG (Tums) Chewable 1 Tablet(s) Chew daily  cefepime   IVPB 2000 milliGRAM(s) IV Intermittent every 12 hours  cholecalciferol 2000 Unit(s) Oral daily  cyanocobalamin 1000 MICROGram(s) Oral daily  folic acid 1 milliGRAM(s) Oral daily  guaiFENesin ER 1200 milliGRAM(s) Oral every 12 hours  methylPREDNISolone sodium succinate Injectable 40 milliGRAM(s) IV Push two times a day  multivitamin 1 Tablet(s) Oral daily      LABS: All Labs Reviewed:                        10.0   14.64 )-----------( 249      ( 28 Nov 2018 06:51 )             30.9     11-28    145  |  113<H>  |  15  ----------------------------<  148<H>  4.1   |  23  |  0.65    Ca    8.4<L>      28 Nov 2018 06:51            Blood Culture: Organism --  Gram Stain Blood -- Gram Stain --  Specimen Source .Blood Blood  Culture-Blood --    Organism --  Gram Stain Blood -- Gram Stain --  Specimen Source .Blood Blood  Culture-Blood --        RADIOLOGY/EKG:    A/P

## 2018-11-28 NOTE — CONSULT NOTE ADULT - ASSESSMENT
73-year-old  female with multiple medical problems including hypertension, osteoarthritis status post bilateral knee replacement, left hemiparesis, on anticoagulation, recent pelvic fracture, and rheumatoid arthritis.    Rheumatoid arthritis-  based on her history she was diagnosed about 2 years ago based on joint pains and abnormal blood work. She was on Plaquenil and methotrexate at 17.5 mg once a week. She was steroid dependent and was taken off the steroids once she had the pelvic fracture.   She has now been on cimzia for the last 3 months, we tried xeljanz briefly but resumed cimzia a month ago.    We'll continue with monthly cimzia injections for now. She was taken off MTX due to new dx of ? COPD.     COPD/ cough/ PNA-  The possibilities include PNA/ interstitial lung disease secondary to rheumatoid arthritis, toxicity to methotrexate. Steroids as per pul, await bronchoscopy in am.     Pancreatic mass-  she was seeing hematology as outpt and was recently noted to have pancreatic mass w/u is in progress.     will follow.    Followup 6 weeks. She is aware to call if her joint pains worsen.

## 2018-11-28 NOTE — PROGRESS NOTE ADULT - SUBJECTIVE AND OBJECTIVE BOX
Date of service: 11-28-18 @ 11:38    pt seen and examined  feels better  less cough  no fevers     ROS: no fever or chills; denies dizziness, no HA,  no abdominal pain, no diarrhea or constipation; no dysuria, no urinary frequency, no legs pain, no rashes    MEDICATIONS  (STANDING):  ALBUTerol/ipratropium for Nebulization 3 milliLiter(s) Nebulizer every 6 hours  amLODIPine   Tablet 10 milliGRAM(s) Oral daily  apixaban 5 milliGRAM(s) Oral every 12 hours  aspirin  chewable 81 milliGRAM(s) Oral daily  atorvastatin 40 milliGRAM(s) Oral at bedtime  azithromycin  IVPB 500 milliGRAM(s) IV Intermittent every 24 hours  calcium carbonate    500 mG (Tums) Chewable 1 Tablet(s) Chew daily  cefepime   IVPB 2000 milliGRAM(s) IV Intermittent every 12 hours  cholecalciferol 2000 Unit(s) Oral daily  cyanocobalamin 1000 MICROGram(s) Oral daily  folic acid 1 milliGRAM(s) Oral daily  guaiFENesin ER 1200 milliGRAM(s) Oral every 12 hours  methylPREDNISolone sodium succinate Injectable 40 milliGRAM(s) IV Push two times a day  multivitamin 1 Tablet(s) Oral daily      Vital Signs Last 24 Hrs  T(C): 36.5 (28 Nov 2018 05:26), Max: 36.5 (28 Nov 2018 05:26)  T(F): 97.7 (28 Nov 2018 05:26), Max: 97.7 (28 Nov 2018 05:26)  HR: 85 (28 Nov 2018 09:32) (80 - 97)  BP: 119/66 (28 Nov 2018 05:26) (117/71 - 119/66)  BP(mean): --  RR: 18 (27 Nov 2018 20:55) (18 - 18)  SpO2: 97% (28 Nov 2018 05:26) (94% - 97%)      PE:  Constitutional: frail looking  HEENT: NC/AT, EOMI, PERRLA, conjunctivae clear; ears and nose atraumatic; pharynx benign  Neck: supple; thyroid not palpable  Back: no tenderness  Respiratory: decreased breath sounds, R lung with rhonchi/rales  Cardiovascular: S1S2 regular, no murmurs  Abdomen: soft, not tender, not distended, positive BS; liver and spleen WNL  Genitourinary: no suprapubic tenderness  Lymphatic: no LN palpable  Musculoskeletal: no muscle tenderness, no joint swelling or tenderness  Extremities: no pedal edema  Neurological/ Psychiatric: moving all extremities  Skin: no rashes; no palpable lesions    Labs: all available labs reviewed                                   10.0   14.64 )-----------( 249      ( 28 Nov 2018 06:51 )             30.9     11-28    145  |  113<H>  |  15  ----------------------------<  148<H>  4.1   |  23  |  0.65    Ca    8.4<L>      28 Nov 2018 06:51    TPro  6.7  /  Alb  3.4  /  TBili  0.5  /  DBili  x   /  AST  40<H>  /  ALT  33  /  AlkPhos  132<H>  11-26           Cultures:     Culture - Blood (11.26.18 @ 19:50)    Specimen Source: .Blood Blood    Culture Results:   No growth to date.    Culture - Blood (11.26.18 @ 19:49)    Specimen Source: .Blood Blood    Culture Results:   No growth to date.          Radiology: all available radiological tests reviewed      < from: Xray Chest 1 View- PORTABLE-Urgent (11.26.18 @ 20:58) >  EXAM:  XR CHEST PORTABLE URGENT 1V                            PROCEDURE DATE:  11/26/2018          INTERPRETATION:  Chest one view    HISTORY: Shortness of breath    COMPARISON STUDY: 3/15/2018    Frontal expiratory view of the chest shows the heart to be similar in   size. The lungs show patchy right lung infiltrates and there is no   evidence of pneumothorax nor pleural effusion. Metallic sensor is again   noted over the heart.    IMPRESSION:  Right infiltrates.      The above findings correspond to a note entered into the hospital's image   software system by the emergency department staff at the time of the   study.     Thank you for the courtesy of this referral.    < end of copied text >    Advanced directives addressed: full resuscitation

## 2018-11-28 NOTE — PROGRESS NOTE ADULT - SUBJECTIVE AND OBJECTIVE BOX
Patient is a 73y old  Female who presents with a chief complaint of SOB, cough (27 Nov 2018 11:59)      HPI:  73 y.o with PMH of RA on Cimzia, CVA,  s/p B/l TKR,  HTN, HLD, Fatty liver, pancreatic lesion presented to ED c/o difficulty breathing and cough which first started about 1 month ago and got worse, symptoms associated with  feeling cold and chills at night, decreased appetite and lost weight ( 3lbs).  No fevers. Was seen by PCP and started on Ceftin, but was so nauseous, couldn't take it yesterday. Also was seen by Dr Nieves and had CT angio done, which was neg for PE, but + PNA ( as per D/w DR Nieves).  Pt states that previously was on MTX, but now gets Cimzia monthly. Pt denies diarrhea, no Vomiting, states felt lightheaded at home but feels better after IVF  In ED: Low grade fever, mild Leukocytosis. CXR: + RLL infiltrate. Was given, Vanco and Cefepime.  IVF 2L bolus. 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  H/O cataract extraction, right: 1/2015      PREVIOUS DIAGNOSTIC TESTING:      MEDICATIONS  (STANDING):  ALBUTerol/ipratropium for Nebulization 3 milliLiter(s) Nebulizer every 6 hours  amLODIPine   Tablet 10 milliGRAM(s) Oral daily  apixaban 5 milliGRAM(s) Oral every 12 hours  aspirin  chewable 81 milliGRAM(s) Oral daily  atorvastatin 40 milliGRAM(s) Oral at bedtime  azithromycin  IVPB 500 milliGRAM(s) IV Intermittent every 24 hours  calcium carbonate    500 mG (Tums) Chewable 1 Tablet(s) Chew daily  cefepime   IVPB 2000 milliGRAM(s) IV Intermittent every 12 hours  cholecalciferol 2000 Unit(s) Oral daily  cyanocobalamin 1000 MICROGram(s) Oral daily  folic acid 1 milliGRAM(s) Oral daily  guaiFENesin ER 1200 milliGRAM(s) Oral every 12 hours  methylPREDNISolone sodium succinate Injectable 40 milliGRAM(s) IV Push two times a day  multivitamin 1 Tablet(s) Oral daily    MEDICATIONS  (PRN):  acetaminophen   Tablet .. 650 milliGRAM(s) Oral every 6 hours PRN Temp greater or equal to 38C (100.4F), Mild Pain (1 - 3)  bisacodyl 5 milliGRAM(s) Oral daily PRN Constipation  docusate sodium 100 milliGRAM(s) Oral three times a day PRN Constipation  ondansetron Injectable 4 milliGRAM(s) IV Push every 6 hours PRN Nausea        FAMILY HISTORY:  Family history of heart disease (Mother)  Family history of lung cancer (Father)      SOCIAL HISTORY:  ***    REVIEW OF SYSTEM:  dyspnea, cough, fatigue, otherwise 12 point ROS negative   Vital Signs Last 24 Hrs  T(C): 36.4 (27 Nov 2018 10:17), Max: 37.8 (26 Nov 2018 19:34)  T(F): 97.5 (27 Nov 2018 10:17), Max: 100 (26 Nov 2018 19:34)  HR: 86 (27 Nov 2018 10:17) (79 - 93)  BP: 114/66 (27 Nov 2018 10:17) (106/71 - 125/70)  BP(mean): --  RR: 20 (27 Nov 2018 10:17) (17 - 20)  SpO2: 95% (27 Nov 2018 10:17) (87% - 97%)    I&O's Summary    PHYSICAL EXAM  General Appearance: cooperative, no acute distress,   HEENT: PERRL, conjunctiva clear, EOM's intact, non injected pharynx, no exudate, TM   normal  Neck: Supple, , no adenopathy, thyroid: not enlarged, no carotid bruit or JVD  Back: Symmetric, no  tenderness,no soft tissue tenderness  Lungs: Inspiratory rales bilaterally   Heart: Regular rate and rhythm, S1, S2 normal, no murmur, rub or gallop  Abdomen: Soft, non-tender, bowel sounds active , no hepatosplenomegaly  Extremities: no cyanosis or edema, no joint swelling  Skin: Skin color, texture normal, no rashes   Neurologic: Alert and oriented X3 , cranial nerves intact, sensory and motor normal,    ECG:    LABS:                          11.2   10.53 )-----------( 250      ( 26 Nov 2018 19:49 )             33.8     11-26    143  |  109<H>  |  12  ----------------------------<  125<H>  3.9   |  25  |  0.91    Ca    8.7      26 Nov 2018 19:49    TPro  6.7  /  Alb  3.4  /  TBili  0.5  /  DBili  x   /  AST  40<H>  /  ALT  33  /  AlkPhos  132<H>  11-26    CARDIAC MARKERS ( 26 Nov 2018 19:49 )  <0.015 ng/mL / x     / x     / x     / x            Pro BNP  190 11-26 @ 19:49  D Dimer  273 11-26 @ 19:49    PT/INR - ( 26 Nov 2018 19:49 )   PT: 18.0 sec;   INR: 1.60 ratio         PTT - ( 26 Nov 2018 19:49 )  PTT:30.6 sec          RADIOLOGY & ADDITIONAL STUDIES:    CT CHEST 11/26/18    No pulmonary emboli. Probable LVH. Atherosclerotic disease. Left anterior chest wall loop recorder device.     New moderate geographic groundglass opacities and septal thickening in all lobes with mid to upper lung predominance (right greater than left), more pronounced within the lung periphery. The appearances are nonspecific, however, possible diagnostic considerations would include atypical infection, drug reaction, acute interstitial pneumonia. The peripheral distribution and lack of effusions would be atypical for cardiogenic pulmonary edema. Suggest correlation with infectious screen, short interval follow-up chest imaging, and consideration of bronchoscopy/BAL (if clinically appropriate).     Few small (4 mm or less) solid nodules in both lungs, likely benign. If the patient is considered high risk, a reduced dose follow-up chest CT could be performed in 12 months time to reassess.     Subcentimeter and mildly enlarged mediastinal lymph nodes, likely reactive.     Small sliding hiatal hernia.

## 2018-11-28 NOTE — PROGRESS NOTE ADULT - SUBJECTIVE AND OBJECTIVE BOX
CC: cough    HPI: 73 y.o with PMH of RA on Cimzia, CVA,  s/p B/l TKR,  HTN, HLD, Fatty liver, pancreatic lesion presented to ED c/o difficulty breathing and cough which first started about 1 month ago and got worse, symptoms associated with  feeling cold and chills at night, decreased appetite and lost weight ( 3lbs).  No fevers. Was seen by PCP and started on Ceftin, but was so nauseous, couldn't take it yesterday. Also was seen by Dr Nieves and had CT angio done, which was neg for PE, but + PNA ( as per D/w DR Nieves).  Pt states that previously was on MTX, but now gets Cimzia monthly. Pt denies diarrhea, no Vomiting, states felt lightheaded at home but feels better after IVF    In ED: Low grade fever, mild Leukocytosis. CXR: + RLL infiltrate. Was given, Vanco and Cefepime.  IVF 2L bolus. Solumedrol 125mg x 1      11/28: + mildly productive cough, no fevers, feels MTX caused her pulmonary symptoms and states  "I've had enough" wants only pain relief for arthritis and no further immunomodulators.     ROS: stated above, no fevers / chills.     Vital Signs Last 24 Hrs  T(C): 36.8 (28 Nov 2018 12:17), Max: 36.8 (28 Nov 2018 12:17)  T(F): 98.2 (28 Nov 2018 12:17), Max: 98.2 (28 Nov 2018 12:17)  HR: 88 (28 Nov 2018 14:02) (80 - 97)  BP: 129/69 (28 Nov 2018 12:17) (117/71 - 129/69)  BP(mean): --  RR: 18 (28 Nov 2018 12:17) (18 - 18)  SpO2: 97% (28 Nov 2018 12:17) (94% - 97%)    PHYSICAL EXAM:  Constitutional: NAD, awake and alert, well-developed  HEENT: PERR, EOMI, Normal Hearing, MMM  Neck: Soft and supple, No LAD, No JVD  Respiratory: Breath sounds are equal B/L with fine crackles at bases. No wheezing  Cardiovascular: S1 and S2, regular rate and rhythm.  Gastrointestinal: Bowel Sounds present, soft, nontender, nondistended, no guarding, no rebound  Extremities: No peripheral edema, mild finger joint deformities from RA noted  Vascular: 2+ peripheral pulses  Neurological: A/O x 3, no focal deficits  Musculoskeletal: 5/5 strength b/l upper and lower extremities  Skin: No rashes    MEDICATIONS  (STANDING):  ALBUTerol/ipratropium for Nebulization 3 milliLiter(s) Nebulizer every 6 hours  amLODIPine   Tablet 10 milliGRAM(s) Oral daily  apixaban 5 milliGRAM(s) Oral every 12 hours  aspirin  chewable 81 milliGRAM(s) Oral daily  atorvastatin 40 milliGRAM(s) Oral at bedtime  azithromycin  IVPB 500 milliGRAM(s) IV Intermittent every 24 hours  benzonatate 100 milliGRAM(s) Oral three times a day  calcium carbonate    500 mG (Tums) Chewable 1 Tablet(s) Chew daily  cefepime   IVPB 2000 milliGRAM(s) IV Intermittent every 12 hours  cholecalciferol 2000 Unit(s) Oral daily  cyanocobalamin 1000 MICROGram(s) Oral daily  folic acid 1 milliGRAM(s) Oral daily  guaiFENesin ER 1200 milliGRAM(s) Oral every 12 hours  methylPREDNISolone sodium succinate Injectable 40 milliGRAM(s) IV Push two times a day  multivitamin 1 Tablet(s) Oral daily    LABS: All Labs Reviewed:                        10.0   14.64 )-----------( 249      ( 28 Nov 2018 06:51 )             30.9     11-28    145  |  113<H>  |  15  ----------------------------<  148<H>  4.1   |  23  |  0.65    Ca    8.4<L>      28 Nov 2018 06:51  TPro  6.7  /  Alb  3.4  /  TBili  0.5  /  DBili  x   /  AST  40<H>  /  ALT  33  /  AlkPhos  132<H>  11-26    PT/INR - ( 26 Nov 2018 19:49 )   PT: 18.0 sec;   INR: 1.60 ratio    PTT - ( 26 Nov 2018 19:49 )  PTT:30.6 sec  CARDIAC MARKERS ( 26 Nov 2018 19:49 )  <0.015 ng/mL / x     / x     / x     / x        Blood Culture: 11-26 @ 19:50  Organism --  Gram Stain Blood -- Gram Stain --  Specimen Source .Blood Blood  Culture-Blood --    11-26 @ 19:49  Organism --  Gram Stain Blood -- Gram Stain --  Specimen Source .Blood Blood  Culture-Blood --

## 2018-11-29 ENCOUNTER — RESULT REVIEW (OUTPATIENT)
Age: 73
End: 2018-11-29

## 2018-11-29 LAB
B PERT IGG+IGM PNL SER: ABNORMAL
B PERT IGG+IGM PNL SER: ABNORMAL
COLOR FLD: SIGNIFICANT CHANGE UP
COLOR FLD: SIGNIFICANT CHANGE UP
EOSINOPHIL # FLD: 2 % — SIGNIFICANT CHANGE UP
EOSINOPHIL # FLD: 4 % — SIGNIFICANT CHANGE UP
FLUID INTAKE SUBSTANCE CLASS: SIGNIFICANT CHANGE UP
FLUID INTAKE SUBSTANCE CLASS: SIGNIFICANT CHANGE UP
FLUID SEGMENTED GRANULOCYTES: 72 % — SIGNIFICANT CHANGE UP
FLUID SEGMENTED GRANULOCYTES: 85 % — SIGNIFICANT CHANGE UP
GRAM STN FLD: SIGNIFICANT CHANGE UP
GRAM STN FLD: SIGNIFICANT CHANGE UP
HCT VFR BLD CALC: 30.9 % — LOW (ref 34.5–45)
HGB BLD-MCNC: 10 G/DL — LOW (ref 11.5–15.5)
LYMPHOCYTES # FLD: 21 % — SIGNIFICANT CHANGE UP
LYMPHOCYTES # FLD: 9 % — SIGNIFICANT CHANGE UP
MCHC RBC-ENTMCNC: 28.6 PG — SIGNIFICANT CHANGE UP (ref 27–34)
MCHC RBC-ENTMCNC: 32.4 GM/DL — SIGNIFICANT CHANGE UP (ref 32–36)
MCV RBC AUTO: 88.3 FL — SIGNIFICANT CHANGE UP (ref 80–100)
MESOTHL CELL # FLD: 1 % — SIGNIFICANT CHANGE UP
MONOS+MACROS # FLD: 1 % — SIGNIFICANT CHANGE UP
MONOS+MACROS # FLD: 5 % — SIGNIFICANT CHANGE UP
NIGHT BLUE STAIN TISS: SIGNIFICANT CHANGE UP
NIGHT BLUE STAIN TISS: SIGNIFICANT CHANGE UP
NON-GYN CYTOLOGY SPEC: SIGNIFICANT CHANGE UP
NON-GYN CYTOLOGY SPEC: SIGNIFICANT CHANGE UP
NRBC # BLD: 0 /100 WBCS — SIGNIFICANT CHANGE UP (ref 0–0)
PLATELET # BLD AUTO: 260 K/UL — SIGNIFICANT CHANGE UP (ref 150–400)
RBC # BLD: 3.5 M/UL — LOW (ref 3.8–5.2)
RBC # FLD: 14.6 % — HIGH (ref 10.3–14.5)
RCV VOL RI: 6000 /UL — HIGH (ref 0–5)
RCV VOL RI: 7000 /UL — HIGH (ref 0–5)
SPECIMEN SOURCE FLD: SIGNIFICANT CHANGE UP
SPECIMEN SOURCE FLD: SIGNIFICANT CHANGE UP
SPECIMEN SOURCE: SIGNIFICANT CHANGE UP
TOTAL NUCLEATED CELL COUNT, BODY FLUID: 513 /UL — HIGH (ref 0–5)
TOTAL NUCLEATED CELL COUNT, BODY FLUID: 896 /UL — HIGH (ref 0–5)
TUBE TYPE: SIGNIFICANT CHANGE UP
TUBE TYPE: SIGNIFICANT CHANGE UP
WBC # BLD: 11.34 K/UL — HIGH (ref 3.8–10.5)
WBC # FLD AUTO: 11.34 K/UL — HIGH (ref 3.8–10.5)

## 2018-11-29 PROCEDURE — 99232 SBSQ HOSP IP/OBS MODERATE 35: CPT

## 2018-11-29 PROCEDURE — 88108 CYTOPATH CONCENTRATE TECH: CPT | Mod: 26

## 2018-11-29 RX ORDER — CEFEPIME 1 G/1
2000 INJECTION, POWDER, FOR SOLUTION INTRAMUSCULAR; INTRAVENOUS EVERY 12 HOURS
Qty: 0 | Refills: 0 | Status: DISCONTINUED | OUTPATIENT
Start: 2018-11-29 | End: 2018-11-30

## 2018-11-29 RX ORDER — FOLIC ACID 0.8 MG
1 TABLET ORAL DAILY
Qty: 0 | Refills: 0 | Status: DISCONTINUED | OUTPATIENT
Start: 2018-11-29 | End: 2018-11-30

## 2018-11-29 RX ORDER — CHOLECALCIFEROL (VITAMIN D3) 125 MCG
2000 CAPSULE ORAL DAILY
Qty: 0 | Refills: 0 | Status: DISCONTINUED | OUTPATIENT
Start: 2018-11-29 | End: 2018-11-30

## 2018-11-29 RX ORDER — ASPIRIN/CALCIUM CARB/MAGNESIUM 324 MG
81 TABLET ORAL DAILY
Qty: 0 | Refills: 0 | Status: DISCONTINUED | OUTPATIENT
Start: 2018-11-29 | End: 2018-11-30

## 2018-11-29 RX ORDER — ONDANSETRON 8 MG/1
4 TABLET, FILM COATED ORAL EVERY 6 HOURS
Qty: 0 | Refills: 0 | Status: DISCONTINUED | OUTPATIENT
Start: 2018-11-29 | End: 2018-11-30

## 2018-11-29 RX ORDER — AZITHROMYCIN 500 MG/1
500 TABLET, FILM COATED ORAL EVERY 24 HOURS
Qty: 0 | Refills: 0 | Status: DISCONTINUED | OUTPATIENT
Start: 2018-11-29 | End: 2018-11-30

## 2018-11-29 RX ORDER — IPRATROPIUM/ALBUTEROL SULFATE 18-103MCG
3 AEROSOL WITH ADAPTER (GRAM) INHALATION ONCE
Qty: 0 | Refills: 0 | Status: COMPLETED | OUTPATIENT
Start: 2018-11-29 | End: 2018-11-29

## 2018-11-29 RX ORDER — APIXABAN 2.5 MG/1
5 TABLET, FILM COATED ORAL EVERY 12 HOURS
Qty: 0 | Refills: 0 | Status: DISCONTINUED | OUTPATIENT
Start: 2018-11-29 | End: 2018-11-30

## 2018-11-29 RX ORDER — SODIUM CHLORIDE 9 MG/ML
1000 INJECTION INTRAMUSCULAR; INTRAVENOUS; SUBCUTANEOUS
Qty: 0 | Refills: 0 | Status: DISCONTINUED | OUTPATIENT
Start: 2018-11-29 | End: 2018-11-29

## 2018-11-29 RX ORDER — ATORVASTATIN CALCIUM 80 MG/1
40 TABLET, FILM COATED ORAL AT BEDTIME
Qty: 0 | Refills: 0 | Status: DISCONTINUED | OUTPATIENT
Start: 2018-11-29 | End: 2018-11-30

## 2018-11-29 RX ORDER — FENTANYL CITRATE 50 UG/ML
25 INJECTION INTRAVENOUS
Qty: 0 | Refills: 0 | Status: DISCONTINUED | OUTPATIENT
Start: 2018-11-29 | End: 2018-11-29

## 2018-11-29 RX ORDER — AMLODIPINE BESYLATE 2.5 MG/1
10 TABLET ORAL DAILY
Qty: 0 | Refills: 0 | Status: DISCONTINUED | OUTPATIENT
Start: 2018-11-29 | End: 2018-11-30

## 2018-11-29 RX ORDER — PREGABALIN 225 MG/1
1000 CAPSULE ORAL DAILY
Qty: 0 | Refills: 0 | Status: DISCONTINUED | OUTPATIENT
Start: 2018-11-29 | End: 2018-11-30

## 2018-11-29 RX ORDER — ACETAMINOPHEN 500 MG
650 TABLET ORAL EVERY 6 HOURS
Qty: 0 | Refills: 0 | Status: DISCONTINUED | OUTPATIENT
Start: 2018-11-29 | End: 2018-11-30

## 2018-11-29 RX ORDER — DOCUSATE SODIUM 100 MG
100 CAPSULE ORAL THREE TIMES A DAY
Qty: 0 | Refills: 0 | Status: DISCONTINUED | OUTPATIENT
Start: 2018-11-29 | End: 2018-11-30

## 2018-11-29 RX ORDER — CALCIUM CARBONATE 500(1250)
1 TABLET ORAL DAILY
Qty: 0 | Refills: 0 | Status: DISCONTINUED | OUTPATIENT
Start: 2018-11-29 | End: 2018-11-30

## 2018-11-29 RX ADMIN — Medication 1200 MILLIGRAM(S): at 17:36

## 2018-11-29 RX ADMIN — CEFEPIME 100 MILLIGRAM(S): 1 INJECTION, POWDER, FOR SOLUTION INTRAMUSCULAR; INTRAVENOUS at 17:35

## 2018-11-29 RX ADMIN — Medication 100 MILLIGRAM(S): at 22:41

## 2018-11-29 RX ADMIN — CEFEPIME 100 MILLIGRAM(S): 1 INJECTION, POWDER, FOR SOLUTION INTRAMUSCULAR; INTRAVENOUS at 05:40

## 2018-11-29 RX ADMIN — Medication 1 TABLET(S): at 11:44

## 2018-11-29 RX ADMIN — Medication 100 MILLIGRAM(S): at 14:47

## 2018-11-29 RX ADMIN — AMLODIPINE BESYLATE 10 MILLIGRAM(S): 2.5 TABLET ORAL at 05:40

## 2018-11-29 RX ADMIN — AMLODIPINE BESYLATE 10 MILLIGRAM(S): 2.5 TABLET ORAL at 11:43

## 2018-11-29 RX ADMIN — Medication 81 MILLIGRAM(S): at 11:44

## 2018-11-29 RX ADMIN — Medication 2000 UNIT(S): at 11:44

## 2018-11-29 RX ADMIN — AZITHROMYCIN 255 MILLIGRAM(S): 500 TABLET, FILM COATED ORAL at 14:47

## 2018-11-29 RX ADMIN — Medication 40 MILLIGRAM(S): at 17:35

## 2018-11-29 RX ADMIN — PREGABALIN 1000 MICROGRAM(S): 225 CAPSULE ORAL at 11:44

## 2018-11-29 RX ADMIN — ATORVASTATIN CALCIUM 40 MILLIGRAM(S): 80 TABLET, FILM COATED ORAL at 22:41

## 2018-11-29 RX ADMIN — FENTANYL CITRATE 25 MICROGRAM(S): 50 INJECTION INTRAVENOUS at 08:54

## 2018-11-29 RX ADMIN — Medication 3 MILLILITER(S): at 08:53

## 2018-11-29 RX ADMIN — Medication 1 MILLIGRAM(S): at 11:44

## 2018-11-29 RX ADMIN — APIXABAN 5 MILLIGRAM(S): 2.5 TABLET, FILM COATED ORAL at 17:35

## 2018-11-29 RX ADMIN — Medication 40 MILLIGRAM(S): at 05:41

## 2018-11-29 NOTE — PROGRESS NOTE ADULT - SUBJECTIVE AND OBJECTIVE BOX
CC: cough    HPI: 73 y.o with PMH of RA on Cimzia, CVA,  s/p B/l TKR,  HTN, HLD, Fatty liver, pancreatic lesion presented to ED c/o difficulty breathing and cough which first started about 1 month ago and got worse, symptoms associated with  feeling cold and chills at night, decreased appetite and lost weight ( 3lbs).  No fevers. Was seen by PCP and started on Ceftin, but was so nauseous, couldn't take it yesterday. Also was seen by Dr Nieves and had CT angio done, which was neg for PE, but + PNA ( as per D/w DR Nieves).  Pt states that previously was on MTX, but now gets Cimzia monthly. Pt denies diarrhea, no Vomiting, states felt lightheaded at home but feels better after IVF    In ED: Low grade fever, mild Leukocytosis. CXR: + RLL infiltrate. Was given, Vanco and Cefepime.  IVF 2L bolus. Solumedrol 125mg x 1      11/29:  Pt was seen and examined, s/p bronchoscopy this am, tolerated well, c/o mild throat pain after procedure, otherwise SOB improving. No SOB     ROS:  negative except mentioned above     Vital Signs Last 24 Hrs  T(C): 36.7 (29 Nov 2018 10:12), Max: 36.9 (29 Nov 2018 08:28)  T(F): 98 (29 Nov 2018 10:12), Max: 98.5 (29 Nov 2018 08:28)  HR: 76 (29 Nov 2018 10:12) (76 - 95)  BP: 113/60 (29 Nov 2018 10:12) (110/66 - 127/70)  RR: 18 (29 Nov 2018 10:12) (15 - 23)  SpO2: 100% (29 Nov 2018 10:12) (94% - 100%)      PHYSICAL EXAM:  Constitutional: NAD, awake and alert, well-developed  HEENT: PERRL, EOMI, Normal Hearing, MMM  Neck: Soft and supple, No LAD, No JVD  Respiratory:  Better air entry b/l, less crackles, mostly at bases  No wheezing  Cardiovascular: S1 and S2, regular rate and rhythm.  Gastrointestinal: Bowel Sounds present, soft, nontender, nondistended, no guarding, no rebound  Extremities: No peripheral edema, mild finger joint deformities from RA noted  Vascular: 2+ peripheral pulses  Neurological: A/O x 3, no focal deficits  Musculoskeletal: 5/5 strength b/l upper and lower extremities  Skin: No rashes    MEDICATIONS  (STANDING):  amLODIPine   Tablet 10 milliGRAM(s) Oral daily  apixaban 5 milliGRAM(s) Oral every 12 hours  aspirin  chewable 81 milliGRAM(s) Oral daily  atorvastatin 40 milliGRAM(s) Oral at bedtime  azithromycin  IVPB 500 milliGRAM(s) IV Intermittent every 24 hours  benzonatate 100 milliGRAM(s) Oral three times a day  calcium carbonate    500 mG (Tums) Chewable 1 Tablet(s) Chew daily  cefepime   IVPB 2000 milliGRAM(s) IV Intermittent every 12 hours  cholecalciferol 2000 Unit(s) Oral daily  cyanocobalamin 1000 MICROGram(s) Oral daily  folic acid 1 milliGRAM(s) Oral daily  guaiFENesin ER 1200 milliGRAM(s) Oral every 12 hours  methylPREDNISolone sodium succinate Injectable 40 milliGRAM(s) IV Push two times a day  multivitamin 1 Tablet(s) Oral daily    MEDICATIONS  (PRN):  acetaminophen   Tablet .. 650 milliGRAM(s) Oral every 6 hours PRN Temp greater or equal to 38C (100.4F), Mild Pain (1 - 3)  bisacodyl 5 milliGRAM(s) Oral daily PRN Constipation  docusate sodium 100 milliGRAM(s) Oral three times a day PRN Constipation  ondansetron Injectable 4 milliGRAM(s) IV Push every 6 hours PRN Nausea      LABS: All Labs Reviewed                        10.0   11.34 )-----------( 260      ( 29 Nov 2018 10:04 )             30.9     11-28    145  |  113<H>  |  15  ----------------------------<  148<H>  4.1   |  23  |  0.65    Ca    8.4<L>      28 Nov 2018 06:51                              10.0   14.64 )-----------( 249      ( 28 Nov 2018 06:51 )             30.9     11-28    145  |  113<H>  |  15  ----------------------------<  148<H>  4.1   |  23  |  0.65    Ca    8.4<L>      28 Nov 2018 06:51  TPro  6.7  /  Alb  3.4  /  TBili  0.5  /  DBili  x   /  AST  40<H>  /  ALT  33  /  AlkPhos  132<H>  11-26    PT/INR - ( 26 Nov 2018 19:49 )   PT: 18.0 sec;   INR: 1.60 ratio    PTT - ( 26 Nov 2018 19:49 )  PTT:30.6 sec  CARDIAC MARKERS ( 26 Nov 2018 19:49 )  <0.015 ng/mL / x     / x     / x     / x        Culture - Bronchial (11.29.18 @ 07:51)    Gram Stain:   No polymorphonuclear cells seen per low power field  No squamous epithelial cells per low power field  No organisms seen per oil power field    Specimen Source: .Broncial BAL/LEFT    Culture - Bronchial (11.29.18 @ 07:51)    Gram Stain:   Rare polymorphonuclear leukocytes per low power field  No squamous epithelial cells per low power field  No organisms seen per oil power field    Specimen Source: .Broncial RIGHT/BAL    Culture - Blood (11.26.18 @ 19:50)    Specimen Source: .Blood Blood    Culture Results:   No growth to date.

## 2018-11-29 NOTE — PROGRESS NOTE ADULT - SUBJECTIVE AND OBJECTIVE BOX
Date of service: 11-29-18 @ 11:45    pt seen and examined  s/p bronch this am  feels better  less cough  no fevers     ROS: no fever or chills; denies dizziness, no HA, no abdominal pain, no diarrhea or constipation; no dysuria, no urinary frequency, no legs pain, no rashes      MEDICATIONS  (STANDING):  amLODIPine   Tablet 10 milliGRAM(s) Oral daily  apixaban 5 milliGRAM(s) Oral every 12 hours  aspirin  chewable 81 milliGRAM(s) Oral daily  atorvastatin 40 milliGRAM(s) Oral at bedtime  azithromycin  IVPB 500 milliGRAM(s) IV Intermittent every 24 hours  benzonatate 100 milliGRAM(s) Oral three times a day  calcium carbonate    500 mG (Tums) Chewable 1 Tablet(s) Chew daily  cefepime   IVPB 2000 milliGRAM(s) IV Intermittent every 12 hours  cholecalciferol 2000 Unit(s) Oral daily  cyanocobalamin 1000 MICROGram(s) Oral daily  folic acid 1 milliGRAM(s) Oral daily  guaiFENesin ER 1200 milliGRAM(s) Oral every 12 hours  methylPREDNISolone sodium succinate Injectable 40 milliGRAM(s) IV Push two times a day  multivitamin 1 Tablet(s) Oral daily      Vital Signs Last 24 Hrs  T(C): 36.7 (29 Nov 2018 10:12), Max: 36.9 (29 Nov 2018 08:28)  T(F): 98 (29 Nov 2018 10:12), Max: 98.5 (29 Nov 2018 08:28)  HR: 76 (29 Nov 2018 10:12) (76 - 95)  BP: 113/60 (29 Nov 2018 10:12) (110/66 - 129/69)  BP(mean): --  RR: 18 (29 Nov 2018 10:12) (15 - 23)  SpO2: 100% (29 Nov 2018 10:12) (94% - 100%)      PE:  Constitutional: frail looking  HEENT: NC/AT, EOMI, PERRLA, conjunctivae clear; ears and nose atraumatic; pharynx benign  Neck: supple; thyroid not palpable  Back: no tenderness  Respiratory: decreased breath sounds, R lung with rhonchi/rales  Cardiovascular: S1S2 regular, no murmurs  Abdomen: soft, not tender, not distended, positive BS; liver and spleen WNL  Genitourinary: no suprapubic tenderness  Lymphatic: no LN palpable  Musculoskeletal: no muscle tenderness, no joint swelling or tenderness  Extremities: no pedal edema  Neurological/ Psychiatric: moving all extremities  Skin: no rashes; no palpable lesions    Labs: all available labs reviewed                                                         10.0   11.34 )-----------( 260      ( 29 Nov 2018 10:04 )             30.9     11-28    145  |  113<H>  |  15  ----------------------------<  148<H>  4.1   |  23  |  0.65    Ca    8.4<L>      28 Nov 2018 06:51        Cultures:     Culture - Blood (11.26.18 @ 19:50)    Specimen Source: .Blood Blood    Culture Results:   No growth to date.    Culture - Blood (11.26.18 @ 19:49)    Specimen Source: .Blood Blood    Culture Results:   No growth to date.          Radiology: all available radiological tests reviewed      < from: Xray Chest 1 View- PORTABLE-Urgent (11.26.18 @ 20:58) >  EXAM:  XR CHEST PORTABLE URGENT 1V                            PROCEDURE DATE:  11/26/2018          INTERPRETATION:  Chest one view    HISTORY: Shortness of breath    COMPARISON STUDY: 3/15/2018    Frontal expiratory view of the chest shows the heart to be similar in   size. The lungs show patchy right lung infiltrates and there is no   evidence of pneumothorax nor pleural effusion. Metallic sensor is again   noted over the heart.    IMPRESSION:  Right infiltrates.      The above findings correspond to a note entered into the hospital's image   software system by the emergency department staff at the time of the   study.     Thank you for the courtesy of this referral.    < end of copied text >    Advanced directives addressed: full resuscitation

## 2018-11-29 NOTE — BRIEF OPERATIVE NOTE - OPERATION/FINDINGS
No endobronchial lesions seen  BAL performed in the RML and Lingula  Segments were flushed clean  No retained secretions

## 2018-11-29 NOTE — PROGRESS NOTE ADULT - SUBJECTIVE AND OBJECTIVE BOX
CHIEF COMPLAINT: RA    SUBJECTIVE: she is s/p bronch, nervous about results. denies any new sx    REVIEW OF SYSTEMS:    General: denies fevers, chills or night sweats  Skin: denies rashes or photosensitivity  Ophthalmologic: denies sicca symptoms  ENMT: denies frequent sinus infections, or ear infections, denies nasal perforation	  Respiratory and Thorax: denies asthma, chest pain or SOB  Cardiovascular: denies chest pain or SOB  ENMT: denies frequent sinus infections, or ear infections, denies nasal perforation	  Respiratory and Thorax: denies asthma, chest pain or SOB  Cardiovascular: denies chest pain or SOB	  Gastrointestinal: denies colitis	  Musculoskeletal: denies prolonged morning stiffness  Neurological: denies migraines or seizures    Vital Signs Last 24 Hrs  T(C): 36.7 (29 Nov 2018 10:12), Max: 36.9 (29 Nov 2018 08:28)  T(F): 98 (29 Nov 2018 10:12), Max: 98.5 (29 Nov 2018 08:28)  HR: 76 (29 Nov 2018 10:12) (76 - 95)  BP: 113/60 (29 Nov 2018 10:12) (110/66 - 127/70)  BP(mean): --  RR: 18 (29 Nov 2018 10:12) (15 - 23)  SpO2: 100% (29 Nov 2018 10:12) (94% - 100%)    I&O's Summary    29 Nov 2018 07:01  -  29 Nov 2018 13:20  --------------------------------------------------------  IN: 400 mL / OUT: 0 mL / NET: 400 mL        CAPILLARY BLOOD GLUCOSE          PHYSICAL EXAM:    Constitutional: NAD, awake and alert, well-developed  HEENT- no oral lesions noted, no lymphadenoapthy noted  Heart- S1 S2 reg  Lungs- CTA b/l  Abd- Soft NT  Ext- no edema  Skin- no rashes  Musculoskelatal- FROM all joints, no active synovitis noted  Neurological- intact strength upper and lower extremities      MEDICATIONS:  MEDICATIONS  (STANDING):  amLODIPine   Tablet 10 milliGRAM(s) Oral daily  apixaban 5 milliGRAM(s) Oral every 12 hours  aspirin  chewable 81 milliGRAM(s) Oral daily  atorvastatin 40 milliGRAM(s) Oral at bedtime  azithromycin  IVPB 500 milliGRAM(s) IV Intermittent every 24 hours  benzonatate 100 milliGRAM(s) Oral three times a day  calcium carbonate    500 mG (Tums) Chewable 1 Tablet(s) Chew daily  cefepime   IVPB 2000 milliGRAM(s) IV Intermittent every 12 hours  cholecalciferol 2000 Unit(s) Oral daily  cyanocobalamin 1000 MICROGram(s) Oral daily  folic acid 1 milliGRAM(s) Oral daily  guaiFENesin ER 1200 milliGRAM(s) Oral every 12 hours  methylPREDNISolone sodium succinate Injectable 40 milliGRAM(s) IV Push two times a day  multivitamin 1 Tablet(s) Oral daily      LABS: All Labs Reviewed:                        10.0   11.34 )-----------( 260      ( 29 Nov 2018 10:04 )             30.9     11-28    145  |  113<H>  |  15  ----------------------------<  148<H>  4.1   |  23  |  0.65    Ca    8.4<L>      28 Nov 2018 06:51            Blood Culture: 11-26 @ 19:50  Organism --  Gram Stain Blood -- Gram Stain --  Specimen Source .Blood Blood  Culture-Blood --    11-26 @ 19:49  Organism --  Gram Stain Blood -- Gram Stain --  Specimen Source .Blood Blood  Culture-Blood --        RADIOLOGY/EKG:    A/P:

## 2018-11-30 ENCOUNTER — TRANSCRIPTION ENCOUNTER (OUTPATIENT)
Age: 73
End: 2018-11-30

## 2018-11-30 ENCOUNTER — INBOUND DOCUMENT (OUTPATIENT)
Age: 73
End: 2018-11-30

## 2018-11-30 VITALS
OXYGEN SATURATION: 97 % | RESPIRATION RATE: 16 BRPM | TEMPERATURE: 97 F | SYSTOLIC BLOOD PRESSURE: 132 MMHG | DIASTOLIC BLOOD PRESSURE: 73 MMHG | HEART RATE: 80 BPM

## 2018-11-30 RX ORDER — CEFUROXIME AXETIL 250 MG
1 TABLET ORAL
Qty: 12 | Refills: 0 | OUTPATIENT
Start: 2018-11-30 | End: 2018-12-05

## 2018-11-30 RX ORDER — AZITHROMYCIN 500 MG/1
1 TABLET, FILM COATED ORAL
Qty: 1 | Refills: 0 | OUTPATIENT
Start: 2018-11-30 | End: 2018-11-30

## 2018-11-30 RX ADMIN — Medication 1200 MILLIGRAM(S): at 06:32

## 2018-11-30 RX ADMIN — Medication 2000 UNIT(S): at 11:08

## 2018-11-30 RX ADMIN — CEFEPIME 100 MILLIGRAM(S): 1 INJECTION, POWDER, FOR SOLUTION INTRAMUSCULAR; INTRAVENOUS at 06:33

## 2018-11-30 RX ADMIN — PREGABALIN 1000 MICROGRAM(S): 225 CAPSULE ORAL at 11:09

## 2018-11-30 RX ADMIN — Medication 1 TABLET(S): at 11:08

## 2018-11-30 RX ADMIN — Medication 100 MILLIGRAM(S): at 06:32

## 2018-11-30 RX ADMIN — APIXABAN 5 MILLIGRAM(S): 2.5 TABLET, FILM COATED ORAL at 06:32

## 2018-11-30 RX ADMIN — Medication 81 MILLIGRAM(S): at 11:08

## 2018-11-30 RX ADMIN — Medication 1 MILLIGRAM(S): at 11:08

## 2018-11-30 RX ADMIN — AMLODIPINE BESYLATE 10 MILLIGRAM(S): 2.5 TABLET ORAL at 06:32

## 2018-11-30 RX ADMIN — Medication 100 MILLIGRAM(S): at 13:26

## 2018-11-30 RX ADMIN — Medication 1 TABLET(S): at 11:09

## 2018-11-30 RX ADMIN — Medication 40 MILLIGRAM(S): at 06:33

## 2018-11-30 NOTE — PROGRESS NOTE ADULT - ASSESSMENT
For bronchoscopy today.  Consent obtained.  Op note to follow.
1) Interstitial Lung Disease  2) Abnormal CT Chest  3) Diffuse Ground Glass Opacities   4) Dyspnea  5) Hypoxemic Respiratory Failure  6) Rheumatoid Arthritis    73 y.o with PMH of RA on Cimzia, CVA,  s/p B/l TKR,  HTN, HLD, Fatty liver, pancreatic lesion presented to ED c/o difficulty breathing and cough which first started about 1 month ago and got worse, symptoms associated with  feeling cold and chills at night, decreased appetite and lost weight ( 3lbs).  No fevers. Was seen by PCP and started on Ceftin, but was so nauseous, couldn't take it yesterday.   Patient was seen by me in the office on November 26 I performed a CT PE because her oxygen saturation was hovering back and forth I had a low suspicion for pulmonary embolism she also had inspiratory crackles at the bases which could've been suggestive of pulmonary fibrosis so wanted to visualize the parenchyma further.  fortunately CT PE was negative.  However, it shows new moderate geographic ground glass opacities and septal thickening in all lobes with mid to upper lung predominance or pronounced within the parenchyma appearance is nonspecific but being that this is so acute and it was not present 2 weeks ago on her previous CT chest in atypical infection or drug-induced pneumonia is quite possible  Please keep NPO past midnight  Will perform bronchoscopy Appreciate ID recommendations/Rheumatology recommendations.  Continue IV steroids in the interim.
1) Interstitial Lung Disease  2) Abnormal CT Chest  3) Diffuse Ground Glass Opacities   4) Dyspnea  5) Hypoxemic Respiratory Failure  6) Rheumatoid Arthritis    73 y.o with PMH of RA on Cimzia, CVA,  s/p B/l TKR,  HTN, HLD, Fatty liver, pancreatic lesion presented to ED c/o difficulty breathing and cough which first started about 1 month ago and got worse, symptoms associated with  feeling cold and chills at night, decreased appetite and lost weight ( 3lbs).  No fevers. Was seen by PCP and started on Ceftin, but was so nauseous, couldn't take it yesterday.   Patient was seen by me in the office on November 26 I performed a CT PE because her oxygen saturation was hovering back and forth I had a low suspicion for pulmonary embolism she also had inspiratory crackles at the bases which could've been suggestive of pulmonary fibrosis so wanted to visualize the parenchyma further.  fortunately CT PE was negative.  However, it shows new moderate geographic ground glass opacities and septal thickening in all lobes with mid to upper lung predominance or pronounced within the parenchyma appearance is nonspecific but being that this is so acute and it was not present 2 weeks ago on her previous CT chest in atypical infection or drug-induced pneumonia is quite possible  Post bronchoscopy; no endobronchial lesions noted  BAL reviewed, differential does not show macrophages predominant likely due to steroids. Normal eosinophils.  Follow up cytology  Treat as MTX induced lung disease  Transition to Prednisone 40mg Daily  Follow up as an outpatient with me in 1 week.
73 y.o with PMH of RA on Cimzia, CVA,  s/p B/l TKR,  HTN, HLD, Fatty liver, pancreatic lesion admitted for:    1. SOB and cough, likely due to RLL PNA, immunosuppressed host  - continue day #2 Azithromycin / Cefepime for suspected gram neg mayra PNA and atypical bacterial infection.   - for possible bronchoscopy tomorrow w/ Pulm.     - Pt had CT angio outPt and was neg for PE, + RLL opacity  - ESR / thyroid fxn normal, mildly elevated RA factor in the setting of RA.   C/w Solumedrol BID and taper tomorrow, add tessalon perles for cough  D/w DR Nieves  ID eval        2. RA, s/p b/l TKR  Will Hold Cimzia for now   Tylenol for pain  PT     3. H/o CVA  C/w ASA and Lipitor     4. Fatty Liver, Pancreatic lesion   ongoing evaluation with GI Dr Weber next appointment is Monday 12/3.    Dispo: remain inpatient   Discussed on IDR
73 y.o with PMH of RA on Cimzia, CVA,  s/p B/l TKR,  HTN, HLD, Fatty liver, pancreatic lesion admitted for:    1. SOB and cough, likely due to RLL PNA, immunosuppressed host vs  ILD vs due to MTX   - continue day #3 Azithromycin / Cefepime for suspected gram neg mayra PNA and atypical bacterial infection.   - BCX neg   - F/u Bronch CX  - Pt had CT angio outPt and was neg for PE, + RLL opacity  - ESR / thyroid fxn normal, mildly elevated RA factor in the setting of known  RA.   - C/w Solumedrol BID   - C/w  tessalon pearls  for cough  - D/w DR Gaytan   - D/w DR Nieves, suspects MTX IDL, plan to switch to PO steroids in am          2. RA, s/p b/l TKR  Will Hold Cimzia for now   Tylenol for pain  D/w DR Guadalupe, Pt is off MTX x 2 weeks now     3. H/o CVA  C/w ASA and Lipitor     4. Fatty Liver, Pancreatic lesion   ongoing evaluation with GI Dr Weber next appointment is Monday 12/3.    Dispo: remain inpatient, d/c planning in am on Po steroids. Check Pulse ox on ambulation   Discussed on IDR
73 y.o with PMH of RA on Cimzia, CVA,  s/p B/l TKR,  HTN, HLD, fatty liver, pancreatic lesion presented to ED on 11/26 c/o difficulty breathing and cough which first started about 1 month ago and got worse, symptoms associated with feeling cold and chills at night, decreased appetite and lost weight (3lbs). No fevers. Was seen by PCP and started on Ceftin, but unable to take it d/t nausea/decreased PO intake. Also was seen by Dr Nieves and had CT angio done, which was neg for PE, but R lung changes concerning for multifocal pna. Pt states that previously was on MTX, but now gets Cimzia monthly - last injection about 3 weeks ago. In ED: hypoxic 87% RA, T 100, wbc ct 10.5, RVP (-) CXR: R sided infiltrates concerning for PNA was given IV vanco/cefepime/azitho/IV solumedrol.    1. hypoxic resp failure/R multifocal PNA/pneumonitis/immunocompromised host  - slowly improving   - on cefepime [id number] for mssa/strep/gnr resistant coverage #3  - on azithromycin 500mg daily for atypical coverage #3  - continue with abx coverage  - f/u legionella ag/sputum cx f/u bronchial cx  - blood cx no growth  - monitor temps  - on inhalers/steroids per pulm  - tolerating abx well so far; no side effects noted  - reason for abx use and side effects reviewed with patient  - supportive care  - f/u cbc    2. other issues - care per medicine
73 y.o with PMH of RA on Cimzia, CVA,  s/p B/l TKR,  HTN, HLD, fatty liver, pancreatic lesion presented to ED on 11/26 c/o difficulty breathing and cough which first started about 1 month ago and got worse, symptoms associated with feeling cold and chills at night, decreased appetite and lost weight (3lbs). No fevers. Was seen by PCP and started on Ceftin, but unable to take it d/t nausea/decreased PO intake. Also was seen by Dr Nieves and had CT angio done, which was neg for PE, but R lung changes concerning for multifocal pna. Pt states that previously was on MTX, but now gets Cimzia monthly - last injection about 3 weeks ago. In ED: hypoxic 87% RA, T 100, wbc ct 10.5, RVP (-) CXR: R sided infiltrates concerning for PNA was given IV vanco/cefepime/azitho/IV solumedrol.    1. hypoxic resp failure/R multifocal PNA/pneumonitis/immunocompromised host  - slowly improving   - on cefepime [id number] for mssa/strep/gnr resistant coverage #2  - on azithromycin 500mg daily for atypical coverage #2  - continue with abx coverage  - f/u legionella ag/sputum cx  - blood cx no growth  - monitor temps  - on inhalers/steroids per pulm  - tolerating abx well so far; no side effects noted  - reason for abx use and side effects reviewed with patient  - supportive care  - f/u cbc    2. other issues - care per medicine
73 y.o with PMH of RA on Cimzia, CVA,  s/p B/l TKR,  HTN, HLD, fatty liver, pancreatic lesion presented to ED on 11/26 c/o difficulty breathing and cough which first started about 1 month ago and got worse, symptoms associated with feeling cold and chills at night, decreased appetite and lost weight (3lbs). No fevers. Was seen by PCP and started on Ceftin, but unable to take it d/t nausea/decreased PO intake. Also was seen by Dr Nieves and had CT angio done, which was neg for PE, but R lung changes concerning for multifocal pna. Pt states that previously was on MTX, but now gets Cimzia monthly - last injection about 3 weeks ago. In ED: hypoxic 87% RA, T 100, wbc ct 10.5, RVP (-) CXR: R sided infiltrates concerning for PNA was given IV vanco/cefepime/azitho/IV solumedrol.    1. hypoxic resp failure/R multifocal PNA/pneumonitis/immunocompromised host  - slowly improving   - on cefepime [id number] for mssa/strep/gnr resistant coverage #4  - completed 3 days of azithromycin  - continue with abx coverage  - bronchial cx no growth so far  - blood cx no growth  - monitor temps  - on inhalers/steroids per pulm  - tolerating abx well so far; no side effects noted  - reason for abx use and side effects reviewed with patient  - can dc with oral ceftin to complete 7-10 day course  - supportive care      2. other issues - care per medicine
73-year-old  female with multiple medical problems including hypertension, osteoarthritis status post bilateral knee replacement, left hemiparesis, on anticoagulation, recent pelvic fracture, and rheumatoid arthritis.    Rheumatoid arthritis-  based on her history she was diagnosed about 2 years ago based on joint pains and abnormal blood work. She was on Plaquenil and methotrexate at 17.5 mg once a week. She was steroid dependent and was taken off the steroids once she had the pelvic fracture.   She has now been on cimzia for the last 3 months, we tried xeljanz briefly but resumed cimzia a month ago.    We'll continue with monthly cimzia injections for now. She was taken off MTX due to new dx of ? COPD.     COPD/ cough/ PNA-  The possibilities include PNA/ interstitial lung disease secondary to rheumatoid arthritis, toxicity to methotrexate. Steroids as per pul, await bronchoscopy in am.     Pancreatic mass-  she was seeing hematology as outpt and was recently noted to have pancreatic mass w/u is in progress.     will follow.   She is aware to call if her joint pains worsen.

## 2018-11-30 NOTE — DISCHARGE NOTE ADULT - PATIENT PORTAL LINK FT
You can access the [facility], offered by [facility], by registering with the following website: [email or web address]

## 2018-11-30 NOTE — DISCHARGE NOTE ADULT - PLAN OF CARE
improve C/w steroids  C/w inhalers   F/u with Pulm Complete oral abxs F/u with GI for further management F/u with DR Guadalupe when to restart Cimzia

## 2018-11-30 NOTE — DISCHARGE NOTE ADULT - CARE PROVIDER_API CALL
Manuel Sexton), Medicine  [address] [place] [address]  Phone: [phone]  Fax: [phone]    Álvaro Nieves (MD), Internal Medicine  [address] [place] [address]  Phone: [phone]  Fax: [phone]    Jarrod Weber), Gastroenterology; Internal Medicine  [address] [place] [address]  Phone: [phone]  Fax: [phone]    Pardeep Guadalupe (), Internal Medicine; Rheumatology  [address] [place] [address]  Phone: [phone]  Fax: [phone]

## 2018-11-30 NOTE — PHARMACOTHERAPY INTERVENTION NOTE - COMMENTS
Completed med rec using [email or web address]. Verified with patient
Counseled patient about her new antibioitics, prednisone, and benzonatate
PN BUNDLE. Med rec completed on admission
Spoke to pt about her inpatient medications, including her antibiotics for CAP and about her eliquis

## 2018-11-30 NOTE — PROGRESS NOTE ADULT - SUBJECTIVE AND OBJECTIVE BOX
Patient is a 73y old  Female who presents with a chief complaint of SOB, cough (27 Nov 2018 11:59)      HPI:  73 y.o with PMH of RA on Cimzia, CVA,  s/p B/l TKR,  HTN, HLD, Fatty liver, pancreatic lesion presented to ED c/o difficulty breathing and cough which first started about 1 month ago and got worse, symptoms associated with  feeling cold and chills at night, decreased appetite and lost weight ( 3lbs).  No fevers. Was seen by PCP and started on Ceftin, but was so nauseous, couldn't take it yesterday. Also was seen by Dr Nieves and had CT angio done, which was neg for PE, but + PNA ( as per D/w DR Nieves).  Pt states that previously was on MTX, but now gets Cimzia monthly. Pt denies diarrhea, no Vomiting, states felt lightheaded at home but feels better after IVF  In ED: Low grade fever, mild Leukocytosis. CXR: + RLL infiltrate. Was given, Vanco and Cefepime.  IVF 2L bolus. 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  H/O cataract extraction, right: 1/2015      PREVIOUS DIAGNOSTIC TESTING:    MEDICATIONS  (STANDING):  amLODIPine   Tablet 10 milliGRAM(s) Oral daily  apixaban 5 milliGRAM(s) Oral every 12 hours  aspirin  chewable 81 milliGRAM(s) Oral daily  atorvastatin 40 milliGRAM(s) Oral at bedtime  azithromycin  IVPB 500 milliGRAM(s) IV Intermittent every 24 hours  benzonatate 100 milliGRAM(s) Oral three times a day  calcium carbonate    500 mG (Tums) Chewable 1 Tablet(s) Chew daily  cefepime   IVPB 2000 milliGRAM(s) IV Intermittent every 12 hours  cholecalciferol 2000 Unit(s) Oral daily  cyanocobalamin 1000 MICROGram(s) Oral daily  folic acid 1 milliGRAM(s) Oral daily  guaiFENesin ER 1200 milliGRAM(s) Oral every 12 hours  methylPREDNISolone sodium succinate Injectable 40 milliGRAM(s) IV Push two times a day  multivitamin 1 Tablet(s) Oral daily    MEDICATIONS  (PRN):  acetaminophen   Tablet .. 650 milliGRAM(s) Oral every 6 hours PRN Temp greater or equal to 38C (100.4F), Mild Pain (1 - 3)  bisacodyl 5 milliGRAM(s) Oral daily PRN Constipation  docusate sodium 100 milliGRAM(s) Oral three times a day PRN Constipation  ondansetron Injectable 4 milliGRAM(s) IV Push every 6 hours PRN Nausea    Vital Signs Last 24 Hrs  T(C): 36.4 (30 Nov 2018 05:20), Max: 36.7 (29 Nov 2018 21:00)  T(F): 97.6 (30 Nov 2018 05:20), Max: 98 (29 Nov 2018 21:00)  HR: 81 (30 Nov 2018 05:20) (73 - 81)  BP: 135/66 (30 Nov 2018 05:20) (121/68 - 135/66)  BP(mean): --  RR: 17 (30 Nov 2018 05:20) (17 - 17)  SpO2: 97% (30 Nov 2018 05:20) (97% - 97%)    I&O's Summary    PHYSICAL EXAM  General Appearance: cooperative, no acute distress,   HEENT: PERRL, conjunctiva clear, EOM's intact, non injected pharynx, no exudate, TM   normal  Neck: Supple, , no adenopathy, thyroid: not enlarged, no carotid bruit or JVD  Back: Symmetric, no  tenderness,no soft tissue tenderness  Lungs: Inspiratory rales bilaterally   Heart: Regular rate and rhythm, S1, S2 normal, no murmur, rub or gallop  Abdomen: Soft, non-tender, bowel sounds active , no hepatosplenomegaly  Extremities: no cyanosis or edema, no joint swelling  Skin: Skin color, texture normal, no rashes   Neurologic: Alert and oriented X3 , cranial nerves intact, sensory and motor normal,    ECG:    LABS:                          11.2   10.53 )-----------( 250      ( 26 Nov 2018 19:49 )             33.8     11-26    143  |  109<H>  |  12  ----------------------------<  125<H>  3.9   |  25  |  0.91    Ca    8.7      26 Nov 2018 19:49    TPro  6.7  /  Alb  3.4  /  TBili  0.5  /  DBili  x   /  AST  40<H>  /  ALT  33  /  AlkPhos  132<H>  11-26    CARDIAC MARKERS ( 26 Nov 2018 19:49 )  <0.015 ng/mL / x     / x     / x     / x            Pro BNP  190 11-26 @ 19:49  D Dimer  273 11-26 @ 19:49    PT/INR - ( 26 Nov 2018 19:49 )   PT: 18.0 sec;   INR: 1.60 ratio         PTT - ( 26 Nov 2018 19:49 )  PTT:30.6 sec          RADIOLOGY & ADDITIONAL STUDIES:    CT CHEST 11/26/18    No pulmonary emboli. Probable LVH. Atherosclerotic disease. Left anterior chest wall loop recorder device.     New moderate geographic groundglass opacities and septal thickening in all lobes with mid to upper lung predominance (right greater than left), more pronounced within the lung periphery. The appearances are nonspecific, however, possible diagnostic considerations would include atypical infection, drug reaction, acute interstitial pneumonia. The peripheral distribution and lack of effusions would be atypical for cardiogenic pulmonary edema. Suggest correlation with infectious screen, short interval follow-up chest imaging, and consideration of bronchoscopy/BAL (if clinically appropriate).     Few small (4 mm or less) solid nodules in both lungs, likely benign. If the patient is considered high risk, a reduced dose follow-up chest CT could be performed in 12 months time to reassess.     Subcentimeter and mildly enlarged mediastinal lymph nodes, likely reactive.     Small sliding hiatal hernia.

## 2018-11-30 NOTE — DISCHARGE NOTE ADULT - MEDICATION SUMMARY - MEDICATIONS TO TAKE
I will START or STAY ON the medications listed below when I get home from the hospital:    predniSONE 20 mg oral tablet  -- 2 tab(s) by mouth once a day   -- It is very important that you take or use this exactly as directed.  Do not skip doses or discontinue unless directed by your doctor.  Obtain medical advice before taking any non-prescription drugs as some may affect the action of this medication.  Take with food or milk.    -- Indication: For IDL    aspirin 81 mg oral tablet, chewable  -- 1 tab(s) by mouth once a day  -- Indication: For CVA    calcium carbonate 500 mg (200 mg elemental calcium) oral tablet, chewable  -- 1 tab(s) by mouth once a day  -- Indication: For supplement     Eliquis 5 mg oral tablet  -- 1 tab(s) by mouth 2 times a day  -- Indication: For AFIB    atorvastatin 10 mg oral tablet  -- 1 tab(s) by mouth once a day  -- Indication: For HLD    benzonatate 100 mg oral capsule  -- 1 cap(s) by mouth 3 times a day, As Needed -for cough   -- Indication: For Cough    Ventolin HFA 90 mcg/inh inhalation aerosol  -- 2 puff(s) inhaled 4 times a day, As Needed  -- Indication: For wheezing     Symbicort 80 mcg-4.5 mcg/inh inhalation aerosol  -- 2 puff(s) inhaled 4 times a day    *Started yesterday, unsure of dose. No record on DrFirst  -- Indication: For ILD    amLODIPine 10 mg oral tablet  -- 1 tab(s) by mouth once a day  -- Indication: For HTN    cefuroxime 500 mg oral tablet  -- 1 tab(s) by mouth 2 times a day   -- Finish all this medication unless otherwise directed by prescriber.  Medication should be taken with plenty of water.  Take with food or milk.    -- Indication: For PNA    Voltaren 1% topical gel  -- Apply on skin to affected area once a day. Applies after shower  -- Indication: For RA    raNITIdine 150 mg oral tablet  -- 1 tab(s) by mouth 2 times a day  -- Indication: For GERD    Cimzia 200 mg subcutaneous kit  -- monthly   -- Indication: For RA    azithromycin 500 mg oral tablet  -- 1 tab(s) by mouth once a day   -- Do not take dairy products, antacids, or iron preparations within one hour of this medication.  Finish all this medication unless otherwise directed by prescriber.    -- Indication: For PNA    Vitamin D3 2000 intl units oral capsule  -- 1 cap(s) by mouth once a day  -- Indication: For supplement     folic acid 1 mg oral tablet  -- 1 tab(s) by mouth once a day  -- Indication: For supplement     cyanocobalamin 1000 mcg oral tablet  -- 1 tab(s) by mouth once a day  -- Indication: For supplement

## 2018-11-30 NOTE — PROGRESS NOTE ADULT - SUBJECTIVE AND OBJECTIVE BOX
Date of service: 11-30-18 @ 11:28    pt seen and examined  s/p bronch 11/29  feels better  no fevers     ROS: no fever or chills; denies dizziness, no HA, no abdominal pain, no diarrhea or constipation; no dysuria, no urinary frequency, no legs pain, no rashes      MEDICATIONS  (STANDING):  amLODIPine   Tablet 10 milliGRAM(s) Oral daily  apixaban 5 milliGRAM(s) Oral every 12 hours  aspirin  chewable 81 milliGRAM(s) Oral daily  atorvastatin 40 milliGRAM(s) Oral at bedtime  azithromycin  IVPB 500 milliGRAM(s) IV Intermittent every 24 hours  benzonatate 100 milliGRAM(s) Oral three times a day  calcium carbonate    500 mG (Tums) Chewable 1 Tablet(s) Chew daily  cefepime   IVPB 2000 milliGRAM(s) IV Intermittent every 12 hours  cholecalciferol 2000 Unit(s) Oral daily  cyanocobalamin 1000 MICROGram(s) Oral daily  folic acid 1 milliGRAM(s) Oral daily  guaiFENesin ER 1200 milliGRAM(s) Oral every 12 hours  methylPREDNISolone sodium succinate Injectable 40 milliGRAM(s) IV Push two times a day  multivitamin 1 Tablet(s) Oral daily    Vital Signs Last 24 Hrs  T(C): 36.4 (30 Nov 2018 05:20), Max: 36.7 (29 Nov 2018 21:00)  T(F): 97.6 (30 Nov 2018 05:20), Max: 98 (29 Nov 2018 21:00)  HR: 81 (30 Nov 2018 05:20) (73 - 81)  BP: 135/66 (30 Nov 2018 05:20) (121/68 - 135/66)  BP(mean): --  RR: 17 (30 Nov 2018 05:20) (17 - 17)  SpO2: 97% (30 Nov 2018 05:20) (97% - 97%)        PE:  Constitutional: frail looking  HEENT: NC/AT, EOMI, PERRLA, conjunctivae clear; ears and nose atraumatic; pharynx benign  Neck: supple; thyroid not palpable  Back: no tenderness  Respiratory: decreased breath sounds, R lung with rhonchi/rales  Cardiovascular: S1S2 regular, no murmurs  Abdomen: soft, not tender, not distended, positive BS; liver and spleen WNL  Genitourinary: no suprapubic tenderness  Lymphatic: no LN palpable  Musculoskeletal: no muscle tenderness, no joint swelling or tenderness  Extremities: no pedal edema  Neurological/ Psychiatric: moving all extremities  Skin: no rashes; no palpable lesions    Labs: all available labs reviewed                                              10.0   11.34 )-----------( 260      ( 29 Nov 2018 10:04 )             30.9       Cultures:     Culture - Blood (11.26.18 @ 19:50)    Specimen Source: .Blood Blood    Culture Results:   No growth to date.    Culture - Blood (11.26.18 @ 19:49)    Specimen Source: .Blood Blood    Culture Results:   No growth to date.          Radiology: all available radiological tests reviewed      < from: Xray Chest 1 View- PORTABLE-Urgent (11.26.18 @ 20:58) >  EXAM:  XR CHEST PORTABLE URGENT 1V                            PROCEDURE DATE:  11/26/2018          INTERPRETATION:  Chest one view    HISTORY: Shortness of breath    COMPARISON STUDY: 3/15/2018    Frontal expiratory view of the chest shows the heart to be similar in   size. The lungs show patchy right lung infiltrates and there is no   evidence of pneumothorax nor pleural effusion. Metallic sensor is again   noted over the heart.    IMPRESSION:  Right infiltrates.      The above findings correspond to a note entered into the hospital's image   software system by the emergency department staff at the time of the   study.     Thank you for the courtesy of this referral.    < end of copied text >    Advanced directives addressed: full resuscitation

## 2018-11-30 NOTE — DISCHARGE NOTE ADULT - HOSPITAL COURSE
73 y.o with PMH of RA on Cimzia, CVA,  s/p B/l TKR,  HTN, HLD, Fatty liver, pancreatic lesion, AFIB on Eliquis presented to ED c/o difficulty breathing and cough which first started about 1 month ago and got worse, symptoms associated with  feeling cold and chills at night, decreased appetite and lost weight ( 3lbs).  No fevers. Was seen by PCP and started on Ceftin, but was so nauseous, couldn't take it . Also was seen by Dr Nieves and had CT angio done, which was neg for PE, but + PNA ( as per D/w DR Nieves).  Pt states that previously was on MTX, but now gets Cimzia monthly.   In ED: Low grade fever, mild Leukocytosis. CXR: + RLL infiltrate. Was given, Vanco and Cefepime.  IVF 2L bolus. Solumedrol 125mg x 1    Pt was continued on IV Azithromycin and Cefepime also started on Solumedrol. WAs  evaluated by DR Nieves and underwent bronchoscopy, results consistent with IDL likely due to MTX. Will complete empiric TX for PNA. Also Pt will c/w oral prednisone. Today Pt reports feeling much better, SOB and cough improved, comfortable on RA. D/c planning, meds and outpt follow up discussed     Vital Signs Last 24 Hrs  T(C): 36.3 (30 Nov 2018 11:45), Max: 36.7 (29 Nov 2018 21:00)  T(F): 97.3 (30 Nov 2018 11:45), Max: 98 (29 Nov 2018 21:00)  HR: 80 (30 Nov 2018 11:45) (73 - 81)  BP: 132/73 (30 Nov 2018 11:45) (121/68 - 135/66)  RR: 16 (30 Nov 2018 11:45) (16 - 17)  SpO2: 97% (30 Nov 2018 11:45) (97% - 97%)    PHYSICAL EXAM:  Constitutional: NAD, awake and alert, well-developed  HEENT: PERRL, EOMI, Normal Hearing, MMM  Neck: Soft and supple, No LAD, No JVD  Respiratory:  Better air entry b/l, less crackles, mostly at bases  No wheezing  Cardiovascular: S1 and S2, regular rate and rhythm.  Gastrointestinal: Bowel Sounds present, soft, nontender, nondistended, no guarding, no rebound  Extremities: No peripheral edema, mild finger joint deformities from RA noted  Vascular: 2+ peripheral pulses  Neurological: A/O x 3, no focal deficits  Musculoskeletal: 5/5 strength b/l upper and lower extremities  Skin: No rashes    PLAN:     1. SOB and cough, likely 2/2  ILD  due to MTX   - also can  rule out secondary PNA  - On day  #4 Azithromycin / Cefepime for suspected gram neg mayra PNA and atypical bacterial infection. Switch to Oral Azithromycin x 1 day and Ceftin x 6 days   - BCX neg   - F/u Bronch CXL: neg   - ESR / thyroid fxn normal, mildly elevated RA factor in the setting of known  RA.   - On  Solumedrol BID, change to PO Prednisone 40mg QD   - C/w  tessalon pearls  for cough  - D/w DR Gaytan   - D/w DR Nieves, will further follow up with Pt in the office next week        2. RA, s/p b/l TKR  Pt is off MTX x 2 weeks now   Will Hold Cimzia, follow with DR Guadalupe outPt  to restart   Tylenol for pain  D/w DR Guadalupe    3. H/o CVA  C/w ASA and Lipitor     4. Fatty Liver, Pancreatic lesion   ongoing evaluation with GI Dr Weber next appointment is Monday 12/3.      Dispo: Stable for d/c home today   Total tome 45 min  Fax d/c summary to DR Sexton

## 2018-11-30 NOTE — DISCHARGE NOTE ADULT - CARE PLAN
Principal Discharge DX:	Interstitial lung disease  Goal:	improve  Assessment and plan of treatment:	C/w steroids  C/w inhalers   F/u with Pulm  Secondary Diagnosis:	PNA (pneumonia)  Assessment and plan of treatment:	Complete oral abxs  Secondary Diagnosis:	Pancreatic lesion  Assessment and plan of treatment:	F/u with GI for further management  Secondary Diagnosis:	RA (rheumatoid arthritis)  Assessment and plan of treatment:	F/u with DR Guadalupe when to restart Cimzia

## 2018-12-01 LAB
CULTURE RESULTS: NO GROWTH — SIGNIFICANT CHANGE UP
CULTURE RESULTS: NO GROWTH — SIGNIFICANT CHANGE UP
SPECIMEN SOURCE: SIGNIFICANT CHANGE UP
SPECIMEN SOURCE: SIGNIFICANT CHANGE UP

## 2018-12-02 LAB
CULTURE RESULTS: SIGNIFICANT CHANGE UP
CULTURE RESULTS: SIGNIFICANT CHANGE UP
SPECIMEN SOURCE: SIGNIFICANT CHANGE UP
SPECIMEN SOURCE: SIGNIFICANT CHANGE UP

## 2018-12-05 DIAGNOSIS — K44.9 DIAPHRAGMATIC HERNIA WITHOUT OBSTRUCTION OR GANGRENE: ICD-10-CM

## 2018-12-05 DIAGNOSIS — I69.954 HEMIPLEGIA AND HEMIPARESIS FOLLOWING UNSPECIFIED CEREBROVASCULAR DISEASE AFFECTING LEFT NON-DOMINANT SIDE: ICD-10-CM

## 2018-12-05 DIAGNOSIS — J96.91 RESPIRATORY FAILURE, UNSPECIFIED WITH HYPOXIA: ICD-10-CM

## 2018-12-05 DIAGNOSIS — T45.1X5A ADVERSE EFFECT OF ANTINEOPLASTIC AND IMMUNOSUPPRESSIVE DRUGS, INITIAL ENCOUNTER: ICD-10-CM

## 2018-12-05 DIAGNOSIS — J18.9 PNEUMONIA, UNSPECIFIED ORGANISM: ICD-10-CM

## 2018-12-05 DIAGNOSIS — K76.0 FATTY (CHANGE OF) LIVER, NOT ELSEWHERE CLASSIFIED: ICD-10-CM

## 2018-12-05 DIAGNOSIS — J70.4 DRUG-INDUCED INTERSTITIAL LUNG DISORDERS, UNSPECIFIED: ICD-10-CM

## 2018-12-05 DIAGNOSIS — M06.9 RHEUMATOID ARTHRITIS, UNSPECIFIED: ICD-10-CM

## 2018-12-05 DIAGNOSIS — R19.00 INTRA-ABDOMINAL AND PELVIC SWELLING, MASS AND LUMP, UNSPECIFIED SITE: ICD-10-CM

## 2018-12-05 DIAGNOSIS — I10 ESSENTIAL (PRIMARY) HYPERTENSION: ICD-10-CM

## 2018-12-06 ENCOUNTER — APPOINTMENT (OUTPATIENT)
Dept: RHEUMATOLOGY | Facility: CLINIC | Age: 73
End: 2018-12-06
Payer: MEDICARE

## 2018-12-06 VITALS
HEIGHT: 68 IN | OXYGEN SATURATION: 98 % | HEART RATE: 84 BPM | SYSTOLIC BLOOD PRESSURE: 150 MMHG | DIASTOLIC BLOOD PRESSURE: 91 MMHG | BODY MASS INDEX: 22.13 KG/M2 | WEIGHT: 146 LBS

## 2018-12-06 PROBLEM — K86.9 DISEASE OF PANCREAS, UNSPECIFIED: Chronic | Status: ACTIVE | Noted: 2018-11-27

## 2018-12-06 PROBLEM — K76.0 FATTY (CHANGE OF) LIVER, NOT ELSEWHERE CLASSIFIED: Chronic | Status: ACTIVE | Noted: 2018-11-27

## 2018-12-06 PROCEDURE — 99214 OFFICE O/P EST MOD 30 MIN: CPT

## 2018-12-06 RX ORDER — METHOTREXATE 2.5 MG/1
2.5 TABLET ORAL
Qty: 28 | Refills: 3 | Status: DISCONTINUED | COMMUNITY
Start: 2018-01-11 | End: 2018-12-06

## 2018-12-06 RX ORDER — FOLIC ACID 1 MG/1
1 TABLET ORAL
Qty: 30 | Refills: 0 | Status: DISCONTINUED | COMMUNITY
Start: 2017-09-14 | End: 2018-12-06

## 2018-12-07 PROBLEM — I63.9 CEREBRAL INFARCTION, UNSPECIFIED: Chronic | Status: INACTIVE | Noted: 2018-11-26 | Resolved: 2018-12-06

## 2018-12-07 PROBLEM — I48.91 UNSPECIFIED ATRIAL FIBRILLATION: Chronic | Status: INACTIVE | Noted: 2018-11-26 | Resolved: 2018-12-06

## 2018-12-07 LAB
VIRUS SPEC CULT: SIGNIFICANT CHANGE UP
VIRUS SPEC CULT: SIGNIFICANT CHANGE UP

## 2019-01-18 ENCOUNTER — APPOINTMENT (OUTPATIENT)
Dept: RHEUMATOLOGY | Facility: CLINIC | Age: 74
End: 2019-01-18
Payer: MEDICARE

## 2019-01-18 VITALS
SYSTOLIC BLOOD PRESSURE: 145 MMHG | HEART RATE: 89 BPM | HEIGHT: 68 IN | WEIGHT: 159 LBS | DIASTOLIC BLOOD PRESSURE: 79 MMHG | BODY MASS INDEX: 24.1 KG/M2

## 2019-01-18 PROBLEM — I48.91 UNSPECIFIED ATRIAL FIBRILLATION: Chronic | Status: ACTIVE | Noted: 2018-12-06

## 2019-01-18 PROBLEM — I63.9 CEREBRAL INFARCTION, UNSPECIFIED: Chronic | Status: ACTIVE | Noted: 2018-12-06

## 2019-01-18 PROCEDURE — 99215 OFFICE O/P EST HI 40 MIN: CPT

## 2019-01-18 RX ORDER — TRAMADOL HYDROCHLORIDE 50 MG/1
50 TABLET, COATED ORAL
Qty: 90 | Refills: 0 | Status: DISCONTINUED | COMMUNITY
Start: 2018-08-17 | End: 2019-01-18

## 2019-01-18 RX ORDER — HYDROCODONE BITARTRATE AND ACETAMINOPHEN 7.5; 3 MG/1; MG/1
7.5-3 TABLET ORAL
Qty: 45 | Refills: 0 | Status: DISCONTINUED | COMMUNITY
Start: 2018-08-24 | End: 2019-01-18

## 2019-01-18 RX ORDER — ASPIRIN 81 MG/1
81 TABLET, CHEWABLE ORAL
Qty: 90 | Refills: 0 | Status: DISCONTINUED | COMMUNITY
Start: 2018-06-03 | End: 2019-01-18

## 2019-01-18 RX ORDER — APIXABAN 5 MG/1
TABLET, FILM COATED ORAL
Refills: 0 | Status: DISCONTINUED | COMMUNITY
End: 2019-01-18

## 2019-01-18 RX ORDER — ZOSTER VACCINE RECOMBINANT, ADJUVANTED 50 MCG/0.5
50 KIT INTRAMUSCULAR
Qty: 1 | Refills: 0 | Status: DISCONTINUED | COMMUNITY
Start: 2018-06-04 | End: 2019-01-18

## 2019-01-18 NOTE — REVIEW OF SYSTEMS
[Fever] : no fever [Chills] : no chills [Feeling Poorly] : feeling poorly [Feeling Tired] : feeling tired [Eye Pain] : no eye pain [Dry Eyes] : no dryness of the eyes [Loss Of Hearing] : no hearing loss [Chest Pain] : no chest pain [Lower Ext Edema] : no lower extremity edema [Shortness Of Breath] : no shortness of breath [Cough] : no cough [SOB on Exertion] : no shortness of breath during exertion [Abdominal Pain] : no abdominal pain [Dysuria] : no dysuria [Arthralgias] : arthralgias [Joint Pain] : joint pain [Joint Swelling] : joint swelling [Joint Stiffness] : joint stiffness [Skin Lesions] : no skin lesions [Dizziness] : no dizziness [Fainting] : no fainting [Limb Weakness] : no limb weakness [Difficulty Walking] : difficulty walking [Anxiety] : no anxiety [Depression] : no depression [Muscle Weakness] : no muscle weakness [Easy Bruising] : no tendency for easy bruising

## 2019-01-18 NOTE — ASSESSMENT
[FreeTextEntry1] : 73-year-old  female with multiple medical problems including hypertension, osteoarthritis status post bilateral knee replacement, left hemiparesis, on anticoagulation, recent pelvic fracture, and rheumatoid arthritis.\par \par Rheumatoid arthritis-\par based on her history she was diagnosed about 2 years ago based on joint pains and abnormal blood work.  She was steroid dependent and was taken off the steroids once she had the pelvic fracture. \par She has now been on cimzia for the last 3 months, we tried xeljanz breifly but resumed cimzia a month ago.  \par She is off MTX for past month. \par Prior to seeing me she was on Plaquenil with methotrexate.\par She has now been taken off of the methotrexate as well as the cimzia. She is only using prednisone at this time.\par \par COPD-\par She saw oncology outpt, she had a chest x-ray that shows possible emphysema. She has a very short history of smoking. Previous chest x-ray done with me in March of this year was normal. She complains of cough. \par She is aware to call if febrile or unwell. She is aware to hold medications while on antibiotics.\par \par Nonspecific interstitial pneumonitis-\par She was admitted to [facility] last month with pneumonia, she also has superimposed NSIP most likely related to the rheumatoid arthritis. I just spoke to her pulmonologist today, it is unclear if she had to toxicity from methotrexate but we will discontinue it.\par \par Her last CT chest as per pulmonary is much better. For now we will continue with prednisone as treatment for the rheumatoid arthritis as well as the pulmonary issue. She prefers not to use more than 5 mg a day, this may or may not help her. She will start sulfasalazine at 500 mg b.i.d. as her steroid sparing medication of choice. I spoke to Dr. Summers, if need be Rituxan can be used as it will treat both the ILD as well as the rheumatoid arthritis. We'll continue clinical surveillance for now.\par \par Steroid use-\par Risks and benefits of steroids were d/w patient including but not limited to weight gain, diabetes, HTN, avascular necrosis, osteoporosis, glaucoma, cataract, infections and immunosuppression.\par \par \par f/u 6 weeks

## 2019-01-18 NOTE — HISTORY OF PRESENT ILLNESS
[FreeTextEntry1] : Followup visit\par I saw her in the hospital last month. She was admitted with pneumonia.\par \par Prior to being admitted she had been diagnosed with possible interstitial lung disease. While in the hospital she had a bronco, it was negative otherwise.\par \par I just spoke to her pulmonologist, the current working diagnosis is that she had pneumonia, and she most likely has a NSIP related to rheumatoid arthritis. She may also have underlying toxicity from methotrexate although this is unclear.\par \par She is currently using only prednisone, she uses it p.r.n. She has side effects with it. She called me last week when she was in a lot of pain and was using 10 mg, now she is on 5 mg.\par \par She states that she woke up in the middle of the night with pain and swelling over her right shoulder. Today she rates her pain a 5/10 with morning stiffness for at least 30 minutes. She wants to know what the long-term plan is. She has an average appetite and denies weight loss.

## 2019-01-18 NOTE — PHYSICAL EXAM
[General Appearance - Alert] : alert [General Appearance - Well Nourished] : well nourished [General Appearance - Well Developed] : well developed [Sclera] : the sclera and conjunctiva were normal [Oropharynx] : the oropharynx was normal [Neck Appearance] : the appearance of the neck was normal [Respiration, Rhythm And Depth] : normal respiratory rhythm and effort [Heart Sounds] : normal S1 and S2 [Full Pulse] : the pedal pulses are present [Edema] : there was no peripheral edema [Abdomen Tenderness] : non-tender [Cervical Lymph Nodes Enlarged Anterior Bilaterally] : anterior cervical [Supraclavicular Lymph Nodes Enlarged Bilaterally] : supraclavicular [No CVA Tenderness] : no ~M costovertebral angle tenderness [No Spinal Tenderness] : no spinal tenderness [Abnormal Walk] : normal gait [Nail Clubbing] : no clubbing  or cyanosis of the fingernails [Motor Tone] : muscle strength and tone were normal [FreeTextEntry1] : antalgic gait, overall improved/ , bony changes hands, knees b/l TKR, hips and ankles and shoulders FROM, no active synovitis, POM ankles [] : no rash [Deep Tendon Reflexes (DTR)] : deep tendon reflexes were 2+ and symmetric [Motor Exam] : the motor exam was normal [Oriented To Time, Place, And Person] : oriented to person, place, and time [Impaired Insight] : insight and judgment were intact [Affect] : the affect was normal

## 2019-02-04 LAB
ALBUMIN SERPL ELPH-MCNC: 3.9 G/DL
ALP BLD-CCNC: 118 U/L
ALT SERPL-CCNC: 22 U/L
ANION GAP SERPL CALC-SCNC: 13 MMOL/L
AST SERPL-CCNC: 25 U/L
BASOPHILS # BLD AUTO: 0.08 K/UL
BASOPHILS NFR BLD AUTO: 1.3 %
BILIRUB SERPL-MCNC: 0.4 MG/DL
BUN SERPL-MCNC: 22 MG/DL
CALCIUM SERPL-MCNC: 9.5 MG/DL
CHLORIDE SERPL-SCNC: 105 MMOL/L
CO2 SERPL-SCNC: 27 MMOL/L
CREAT SERPL-MCNC: 1.06 MG/DL
CRP SERPL-MCNC: 0.96 MG/DL
EOSINOPHIL # BLD AUTO: 0.39 K/UL
EOSINOPHIL NFR BLD AUTO: 6.1 %
ERYTHROCYTE [SEDIMENTATION RATE] IN BLOOD BY WESTERGREN METHOD: 23 MM/HR
GLUCOSE SERPL-MCNC: 98 MG/DL
HCT VFR BLD CALC: 39.3 %
HGB BLD-MCNC: 12.5 G/DL
IMM GRANULOCYTES NFR BLD AUTO: 0.3 %
LYMPHOCYTES # BLD AUTO: 1.84 K/UL
LYMPHOCYTES NFR BLD AUTO: 28.8 %
MAN DIFF?: NORMAL
MCHC RBC-ENTMCNC: 27.3 PG
MCHC RBC-ENTMCNC: 31.8 GM/DL
MCV RBC AUTO: 85.8 FL
MONOCYTES # BLD AUTO: 0.68 K/UL
MONOCYTES NFR BLD AUTO: 10.6 %
NEUTROPHILS # BLD AUTO: 3.38 K/UL
NEUTROPHILS NFR BLD AUTO: 52.9 %
PLATELET # BLD AUTO: 279 K/UL
POTASSIUM SERPL-SCNC: 3.8 MMOL/L
PROT SERPL-MCNC: 7.2 G/DL
RBC # BLD: 4.58 M/UL
RBC # FLD: 14.4 %
SODIUM SERPL-SCNC: 145 MMOL/L
WBC # FLD AUTO: 6.39 K/UL

## 2019-02-11 ENCOUNTER — APPOINTMENT (OUTPATIENT)
Dept: RHEUMATOLOGY | Facility: CLINIC | Age: 74
End: 2019-02-11
Payer: MEDICARE

## 2019-02-11 VITALS
WEIGHT: 159 LBS | HEART RATE: 102 BPM | HEIGHT: 68 IN | BODY MASS INDEX: 24.1 KG/M2 | OXYGEN SATURATION: 96 % | SYSTOLIC BLOOD PRESSURE: 120 MMHG | DIASTOLIC BLOOD PRESSURE: 77 MMHG

## 2019-02-11 DIAGNOSIS — G89.29 PAIN IN LEFT SHOULDER: ICD-10-CM

## 2019-02-11 DIAGNOSIS — M25.512 PAIN IN LEFT SHOULDER: ICD-10-CM

## 2019-02-11 PROCEDURE — 99214 OFFICE O/P EST MOD 30 MIN: CPT

## 2019-02-11 NOTE — HISTORY OF PRESENT ILLNESS
[FreeTextEntry1] : Followup visit RA.\par She is now on SSz at 1 gm dailay and prednisone 5 mg qd. \par Prior to being admitted she had been diagnosed with possible interstitial lung disease. While in the hospital she had a bronco, it was negative otherwise.\par \par Her per pulmonary, the current working diagnosis is that she had pneumonia, and she most likely has a NSIP related to rheumatoid arthritis. She may also have underlying toxicity from methotrexate although this is unclear.\par Today she rates her pain a 5/10 with morning stiffness for at least 30 minutes. She wants to know what the long-term plan is. She has an average appetite and denies weight loss.

## 2019-02-11 NOTE — ASSESSMENT
[FreeTextEntry1] : 73-year-old  female with multiple medical problems including hypertension, osteoarthritis status post bilateral knee replacement, left hemiparesis, on anticoagulation, recent pelvic fracture, and rheumatoid arthritis.\par \par Rheumatoid arthritis-\par based on her history she was diagnosed about 2 years ago based on joint pains and abnormal blood work.  She was steroid dependent and was taken off the steroids once she had the pelvic fracture. \par She has now been on cimzia for the last 3 months, we tried xeljanz breifly but resumed cimzia a month ago.  \par She is off MTX for past 2 months. \par Prior to seeing me she was on Plaquenil with methotrexate.\par She is now on SSZ at 1 gm to increase to 2 gms. \par \par COPD-\par She saw oncology outpt, she had a chest x-ray that shows possible emphysema. She has a very short history of smoking. Previous chest x-ray done with me in March of this year was normal. She complains of cough. \par She is aware to call if febrile or unwell. She is aware to hold medications while on antibiotics.\par \par Nonspecific interstitial pneumonitis-\par She was admitted to [facility] last month with pneumonia, she also has superimposed NSIP most likely related to the rheumatoid arthritis. As per pulmonologist, it is unclear if she had to toxicity from methotrexate but we will discontinue it.\par \par Her last CT chest as per pulmonary is much better. For now we will continue with prednisone as treatment for the rheumatoid arthritis as well as the pulmonary issue. She prefers not to use more than 5 mg a day, this may or may not help her. If need be Rituxan can be used as it will treat both the ILD as well as the rheumatoid arthritis. We'll continue clinical surveillance for now. She is to review literature/. \par \par Steroid use-\par Risks and benefits of steroids were d/w patient including but not limited to weight gain, diabetes, HTN, avascular necrosis, osteoporosis, glaucoma, cataract, infections and immunosuppression.\par \par \par f/u 6 weeks

## 2019-03-01 ENCOUNTER — RX RENEWAL (OUTPATIENT)
Age: 74
End: 2019-03-01

## 2019-03-04 ENCOUNTER — RX RENEWAL (OUTPATIENT)
Age: 74
End: 2019-03-04

## 2019-03-18 ENCOUNTER — APPOINTMENT (OUTPATIENT)
Dept: RHEUMATOLOGY | Facility: CLINIC | Age: 74
End: 2019-03-18
Payer: MEDICARE

## 2019-03-18 VITALS
HEART RATE: 93 BPM | WEIGHT: 159 LBS | BODY MASS INDEX: 24.1 KG/M2 | DIASTOLIC BLOOD PRESSURE: 79 MMHG | HEIGHT: 68 IN | SYSTOLIC BLOOD PRESSURE: 124 MMHG | OXYGEN SATURATION: 97 %

## 2019-03-18 PROCEDURE — 99214 OFFICE O/P EST MOD 30 MIN: CPT

## 2019-03-18 NOTE — HISTORY OF PRESENT ILLNESS
[FreeTextEntry1] : Followup visit RA.\par She is now on SSz at 2 gm daily and prednisone 5 mg alternating with 2.5 qd. \par Prior to being admitted she had been diagnosed with possible interstitial lung disease. While in the hospital she had a bronco, it was negative otherwise.\par \par As per pulmonary, the current working diagnosis is that she had pneumonia, and she most likely has a NSIP related to rheumatoid arthritis. She may also have underlying toxicity from methotrexate although this is unclear.\par Today she rates her pain a 2/10 with morning stiffness for at least 20 minutes. \par She is better compared to last month and has been able to taper the steroid as well.   She has an average appetite and denies weight loss.\par She has an appt with pul tomorrow

## 2019-03-18 NOTE — ASSESSMENT
[FreeTextEntry1] : 73-year-old  female with multiple medical problems including hypertension, osteoarthritis status post bilateral knee replacement, left hemiparesis, on anticoagulation, recent pelvic fracture, and rheumatoid arthritis.\par \par Rheumatoid arthritis-\par based on her history she was diagnosed about 2 years ago based on joint pains and abnormal blood work.  She was steroid dependent and was taken off the steroids once she had the pelvic fracture. \par  Prior to seeing me she has tried Plaquenil. With me she initially tried cimzia followed by xeljanz in combination with methotrexate. We then resumed the cimzia. A few months ago she was hospitalized with pneumonia, at that time it was unclear whether she had pneumonia versus COPD versus interstitial lung disease or side effect of methotrexate. She continues to stay off both the TNF inhibitors and the methotrexate.\par \par She is currently on sulfasalazine 2 g daily. She defers use of any additional medications. The issue is that she continues to use steroids albeit small dose. She is currently on 5 mg alternating with 2.5 mg. I suggested that she continue to try to taper to 2.5 mg.\par \par COPD-\par She saw oncology outpt, she had a chest x-ray that shows possible emphysema. She has a very short history of smoking. Previous chest x-ray done with me in March 2018 was normal.  \par \par \par Nonspecific interstitial pneumonitis-\par She was admitted to [facility] in December 2018 with pneumonia, she also has superimposed NSIP most likely related to the rheumatoid arthritis. As per pulmonologist, it is unclear if she had to toxicity from methotrexate but we discontinued it.\par \par Her last CT chest as per pulmonary is much better. For now we will continue with prednisone as treatment for the rheumatoid arthritis as well as the pulmonary issue. If need be Rituxan can be used as it will treat both the ILD as well as the rheumatoid arthritis. We'll continue clinical surveillance for now. She is to review literature/. \par \par Steroid use-\par Risks and benefits of steroids were d/w patient including but not limited to weight gain, diabetes, HTN, avascular necrosis, osteoporosis, glaucoma, cataract, infections and immunosuppression.\par \par \par f/u 6 weeks

## 2019-04-03 ENCOUNTER — INPATIENT (INPATIENT)
Facility: HOSPITAL | Age: 74
LOS: 2 days | Discharge: HOME | End: 2019-04-06
Attending: INTERNAL MEDICINE | Admitting: INTERNAL MEDICINE
Payer: MEDICARE

## 2019-04-03 VITALS — WEIGHT: 153 LBS | HEIGHT: 68 IN

## 2019-04-03 DIAGNOSIS — M06.9 RHEUMATOID ARTHRITIS, UNSPECIFIED: ICD-10-CM

## 2019-04-03 DIAGNOSIS — M25.562 PAIN IN LEFT KNEE: ICD-10-CM

## 2019-04-03 DIAGNOSIS — T84.093A OTHER MECHANICAL COMPLICATION OF INTERNAL LEFT KNEE PROSTHESIS, INITIAL ENCOUNTER: Chronic | ICD-10-CM
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
## 2019-04-03 DIAGNOSIS — M25.561 PAIN IN RIGHT KNEE: ICD-10-CM

## 2019-04-03 DIAGNOSIS — E78.2 MIXED HYPERLIPIDEMIA: ICD-10-CM
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
## 2019-04-03 DIAGNOSIS — Z98.890 OTHER SPECIFIED POSTPROCEDURAL STATES: Chronic | ICD-10-CM

## 2019-04-03 DIAGNOSIS — K86.89 OTHER SPECIFIED DISEASES OF PANCREAS: ICD-10-CM

## 2019-04-03 DIAGNOSIS — S00.83XA CONTUSION OF OTHER PART OF HEAD, INITIAL ENCOUNTER: ICD-10-CM

## 2019-04-03 DIAGNOSIS — Z96.653 PRESENCE OF ARTIFICIAL KNEE JOINT, BILATERAL: ICD-10-CM

## 2019-04-03 DIAGNOSIS — I69.354 HEMIPLEGIA AND HEMIPARESIS FOLLOWING CEREBRAL INFARCTION AFFECTING LEFT NON-DOMINANT SIDE: ICD-10-CM

## 2019-04-03 DIAGNOSIS — K76.0 FATTY (CHANGE OF) LIVER, NOT ELSEWHERE CLASSIFIED: ICD-10-CM

## 2019-04-03 DIAGNOSIS — I49.5 SICK SINUS SYNDROME: ICD-10-CM

## 2019-04-03 DIAGNOSIS — R55 SYNCOPE AND COLLAPSE: ICD-10-CM

## 2019-04-03 DIAGNOSIS — Z96.651 PRESENCE OF RIGHT ARTIFICIAL KNEE JOINT: Chronic | ICD-10-CM

## 2019-04-03 DIAGNOSIS — I48.91 UNSPECIFIED ATRIAL FIBRILLATION: ICD-10-CM

## 2019-04-03 DIAGNOSIS — I10 ESSENTIAL (PRIMARY) HYPERTENSION: ICD-10-CM

## 2019-04-03 DIAGNOSIS — Z79.01 LONG TERM (CURRENT) USE OF ANTICOAGULANTS: ICD-10-CM

## 2019-04-03 DIAGNOSIS — I44.0 ATRIOVENTRICULAR BLOCK, FIRST DEGREE: ICD-10-CM

## 2019-04-03 LAB
ALBUMIN SERPL ELPH-MCNC: 3.7 G/DL — SIGNIFICANT CHANGE UP (ref 3.3–5)
ALP SERPL-CCNC: 129 U/L — HIGH (ref 40–120)
ALT FLD-CCNC: 24 U/L — SIGNIFICANT CHANGE UP (ref 12–78)
ANION GAP SERPL CALC-SCNC: 7 MMOL/L — SIGNIFICANT CHANGE UP (ref 5–17)
APPEARANCE UR: CLEAR — SIGNIFICANT CHANGE UP
APTT BLD: 34.5 SEC — SIGNIFICANT CHANGE UP (ref 27.5–36.3)
AST SERPL-CCNC: 24 U/L — SIGNIFICANT CHANGE UP (ref 15–37)
BACTERIA # UR AUTO: NEGATIVE — SIGNIFICANT CHANGE UP
BASOPHILS # BLD AUTO: 0.04 K/UL — SIGNIFICANT CHANGE UP (ref 0–0.2)
BASOPHILS NFR BLD AUTO: 0.6 % — SIGNIFICANT CHANGE UP (ref 0–2)
BILIRUB SERPL-MCNC: 0.5 MG/DL — SIGNIFICANT CHANGE UP (ref 0.2–1.2)
BILIRUB UR-MCNC: NEGATIVE — SIGNIFICANT CHANGE UP
BLD GP AB SCN SERPL QL: SIGNIFICANT CHANGE UP
BUN SERPL-MCNC: 15 MG/DL — SIGNIFICANT CHANGE UP (ref 7–23)
CALCIUM SERPL-MCNC: 9.1 MG/DL — SIGNIFICANT CHANGE UP (ref 8.5–10.1)
CHLORIDE SERPL-SCNC: 106 MMOL/L — SIGNIFICANT CHANGE UP (ref 96–108)
CK SERPL-CCNC: 76 U/L — SIGNIFICANT CHANGE UP (ref 26–192)
CO2 SERPL-SCNC: 27 MMOL/L — SIGNIFICANT CHANGE UP (ref 22–31)
COLOR SPEC: YELLOW — SIGNIFICANT CHANGE UP
CREAT SERPL-MCNC: 0.98 MG/DL — SIGNIFICANT CHANGE UP (ref 0.5–1.3)
DIFF PNL FLD: NEGATIVE — SIGNIFICANT CHANGE UP
EOSINOPHIL # BLD AUTO: 0.17 K/UL — SIGNIFICANT CHANGE UP (ref 0–0.5)
EOSINOPHIL NFR BLD AUTO: 2.6 % — SIGNIFICANT CHANGE UP (ref 0–6)
EPI CELLS # UR: SIGNIFICANT CHANGE UP
GLUCOSE BLDC GLUCOMTR-MCNC: 137 MG/DL — HIGH (ref 70–99)
GLUCOSE SERPL-MCNC: 127 MG/DL — HIGH (ref 70–99)
GLUCOSE UR QL: NEGATIVE MG/DL — SIGNIFICANT CHANGE UP
HCT VFR BLD CALC: 39.3 % — SIGNIFICANT CHANGE UP (ref 34.5–45)
HGB BLD-MCNC: 12.7 G/DL — SIGNIFICANT CHANGE UP (ref 11.5–15.5)
IMM GRANULOCYTES NFR BLD AUTO: 0.3 % — SIGNIFICANT CHANGE UP (ref 0–1.5)
INR BLD: 1.37 RATIO — HIGH (ref 0.88–1.16)
KETONES UR-MCNC: NEGATIVE — SIGNIFICANT CHANGE UP
LEUKOCYTE ESTERASE UR-ACNC: ABNORMAL
LIDOCAIN IGE QN: 224 U/L — SIGNIFICANT CHANGE UP (ref 73–393)
LYMPHOCYTES # BLD AUTO: 1.36 K/UL — SIGNIFICANT CHANGE UP (ref 1–3.3)
LYMPHOCYTES # BLD AUTO: 21.1 % — SIGNIFICANT CHANGE UP (ref 13–44)
MCHC RBC-ENTMCNC: 27.5 PG — SIGNIFICANT CHANGE UP (ref 27–34)
MCHC RBC-ENTMCNC: 32.3 GM/DL — SIGNIFICANT CHANGE UP (ref 32–36)
MCV RBC AUTO: 85.2 FL — SIGNIFICANT CHANGE UP (ref 80–100)
MONOCYTES # BLD AUTO: 0.66 K/UL — SIGNIFICANT CHANGE UP (ref 0–0.9)
MONOCYTES NFR BLD AUTO: 10.2 % — SIGNIFICANT CHANGE UP (ref 2–14)
NEUTROPHILS # BLD AUTO: 4.21 K/UL — SIGNIFICANT CHANGE UP (ref 1.8–7.4)
NEUTROPHILS NFR BLD AUTO: 65.2 % — SIGNIFICANT CHANGE UP (ref 43–77)
NITRITE UR-MCNC: NEGATIVE — SIGNIFICANT CHANGE UP
NRBC # BLD: 0 /100 WBCS — SIGNIFICANT CHANGE UP (ref 0–0)
PH UR: 7 — SIGNIFICANT CHANGE UP (ref 5–8)
PLATELET # BLD AUTO: 242 K/UL — SIGNIFICANT CHANGE UP (ref 150–400)
POTASSIUM SERPL-MCNC: 4.1 MMOL/L — SIGNIFICANT CHANGE UP (ref 3.5–5.3)
POTASSIUM SERPL-SCNC: 4.1 MMOL/L — SIGNIFICANT CHANGE UP (ref 3.5–5.3)
PROT SERPL-MCNC: 7.4 GM/DL — SIGNIFICANT CHANGE UP (ref 6–8.3)
PROT UR-MCNC: NEGATIVE MG/DL — SIGNIFICANT CHANGE UP
PROTHROM AB SERPL-ACNC: 15.4 SEC — HIGH (ref 10–12.9)
RBC # BLD: 4.61 M/UL — SIGNIFICANT CHANGE UP (ref 3.8–5.2)
RBC # FLD: 14.8 % — HIGH (ref 10.3–14.5)
RBC CASTS # UR COMP ASSIST: NEGATIVE /HPF — SIGNIFICANT CHANGE UP (ref 0–4)
SODIUM SERPL-SCNC: 140 MMOL/L — SIGNIFICANT CHANGE UP (ref 135–145)
SP GR SPEC: 1 — LOW (ref 1.01–1.02)
TROPONIN I SERPL-MCNC: <0.015 NG/ML — SIGNIFICANT CHANGE UP (ref 0.01–0.04)
TYPE + AB SCN PNL BLD: SIGNIFICANT CHANGE UP
UROBILINOGEN FLD QL: NEGATIVE MG/DL — SIGNIFICANT CHANGE UP
WBC # BLD: 6.46 K/UL — SIGNIFICANT CHANGE UP (ref 3.8–10.5)
WBC # FLD AUTO: 6.46 K/UL — SIGNIFICANT CHANGE UP (ref 3.8–10.5)
WBC UR QL: SIGNIFICANT CHANGE UP

## 2019-04-03 PROCEDURE — 73562 X-RAY EXAM OF KNEE 3: CPT | Mod: 26,50

## 2019-04-03 PROCEDURE — 70486 CT MAXILLOFACIAL W/O DYE: CPT | Mod: 26

## 2019-04-03 PROCEDURE — 99285 EMERGENCY DEPT VISIT HI MDM: CPT

## 2019-04-03 PROCEDURE — 70450 CT HEAD/BRAIN W/O DYE: CPT | Mod: 26

## 2019-04-03 PROCEDURE — 93010 ELECTROCARDIOGRAM REPORT: CPT

## 2019-04-03 PROCEDURE — 72170 X-RAY EXAM OF PELVIS: CPT | Mod: 26

## 2019-04-03 PROCEDURE — 71045 X-RAY EXAM CHEST 1 VIEW: CPT | Mod: 26

## 2019-04-03 PROCEDURE — 73080 X-RAY EXAM OF ELBOW: CPT | Mod: 26,RT

## 2019-04-03 PROCEDURE — 72125 CT NECK SPINE W/O DYE: CPT | Mod: 26

## 2019-04-03 RX ORDER — SODIUM CHLORIDE 9 MG/ML
1000 INJECTION INTRAMUSCULAR; INTRAVENOUS; SUBCUTANEOUS ONCE
Qty: 0 | Refills: 0 | Status: COMPLETED | OUTPATIENT
Start: 2019-04-03 | End: 2019-04-03

## 2019-04-03 RX ORDER — SULFASALAZINE 500 MG
1000 TABLET ORAL
Qty: 0 | Refills: 0 | Status: DISCONTINUED | OUTPATIENT
Start: 2019-04-03 | End: 2019-04-06

## 2019-04-03 RX ORDER — APIXABAN 2.5 MG/1
2.5 TABLET, FILM COATED ORAL EVERY 12 HOURS
Qty: 0 | Refills: 0 | Status: DISCONTINUED | OUTPATIENT
Start: 2019-04-03 | End: 2019-04-04

## 2019-04-03 RX ORDER — AMLODIPINE BESYLATE 2.5 MG/1
10 TABLET ORAL DAILY
Qty: 0 | Refills: 0 | Status: DISCONTINUED | OUTPATIENT
Start: 2019-04-03 | End: 2019-04-06

## 2019-04-03 RX ADMIN — SODIUM CHLORIDE 1000 MILLILITER(S): 9 INJECTION INTRAMUSCULAR; INTRAVENOUS; SUBCUTANEOUS at 15:00

## 2019-04-03 RX ADMIN — APIXABAN 2.5 MILLIGRAM(S): 2.5 TABLET, FILM COATED ORAL at 18:49

## 2019-04-03 RX ADMIN — SODIUM CHLORIDE 1000 MILLILITER(S): 9 INJECTION INTRAMUSCULAR; INTRAVENOUS; SUBCUTANEOUS at 11:48

## 2019-04-03 RX ADMIN — Medication 1000 MILLIGRAM(S): at 23:00

## 2019-04-03 NOTE — ED ADULT TRIAGE NOTE - CHIEF COMPLAINT QUOTE
pt aaox4, pt states she felt dizzy, and fell to her knees, and head to floor, uncertain if she hit head to flood, +eliquis.  TA called upon arrival.  pt c/o frontal HA, facial pain.  pt to MAIN pt aaox4, pt states she felt dizzy, and fell to her knees, and head to floor, +syncopal episode, +eliquis.  TA called upon arrival.  pt c/o frontal HA, facial pain.  pt to MAIN

## 2019-04-03 NOTE — ED PROVIDER NOTE - CARDIAC, MLM
Normal rate, regular rhythm.  Heart sounds S1, S2.  No murmurs, rubs or gallops. Normal rate, regular rhythm.  Heart sounds S1, S2.  No murmurs, rubs or gallops. normal radial pulse.

## 2019-04-03 NOTE — ED PROVIDER NOTE - SKIN, MLM
Skin normal color for race, warm, dry and intact. No evidence of rash. Skin normal color for race, warm, dry and intact. No evidence of rash. No external evidence of trauma.

## 2019-04-03 NOTE — ED PROVIDER NOTE - OBJECTIVE STATEMENT
72 y/o female with a PMHx of Afib on Eliquis, CVA, s/p Loop Monitor, HTN, Rheumatoid Arthritis presents to the ED s/p fall today. Pt states that she has been feeling dizzy and lightheaded for the past few days. Pt states that today she started to feel dizzy today while eating and almost had a syncopal episode, pt then fell and hit her face and knees. Pt denies full LOC. Pt now c/o bilateral knee pain, right forehead pain. Denies nausea, blurry vision, chest pain, hip pain. Cardio: Dr. Arguello.

## 2019-04-03 NOTE — ED PROVIDER NOTE - CONSTITUTIONAL, MLM
normal... Well appearing, well nourished, awake, alert, oriented to person, place, time/situation and in no apparent distress. White female adult, Well appearing, well nourished, awake, alert, oriented to person, place, time/situation and in no apparent distress. nc/at

## 2019-04-03 NOTE — ED PROVIDER NOTE - ENMT, MLM
Airway patent, Nasal mucosa clear. Mouth with normal mucosa. Throat has no vesicles, no oropharyngeal exudates and uvula is midline. Mouth with slightly dry mucous membranes. Oropharynx clear. no clinical evidence of facial injury.

## 2019-04-03 NOTE — ED PROVIDER NOTE - RESPIRATORY, MLM
Breath sounds clear and equal bilaterally. Breath sounds clear and equal bilaterally. respirations normal.

## 2019-04-03 NOTE — ED ADULT NURSE NOTE - CHIEF COMPLAINT QUOTE
pt aaox4, pt states she felt dizzy, and fell to her knees, and head to floor, +syncopal episode, +eliquis.  TA called upon arrival.  pt c/o frontal HA, facial pain.  pt to MAIN

## 2019-04-03 NOTE — ED ADULT NURSE NOTE - OBJECTIVE STATEMENT
Patient comes to ED for near syncope at home. pt was eating her breakfast when she bent down and "almost passed out" , hitting bilateral knees, right elbow and right face pain. Pt reports hitting face on a piece of furniture. - LOC. pt takes Eliquis. pt reports feeling lightheaded over the last few days. pt reports having a left chest wall loop recorder. pt was told she may need a pacemaker. Pt is AxOx4, all extremities equal in strength.

## 2019-04-03 NOTE — ED PROVIDER NOTE - EYES, MLM
Clear bilaterally, pupils equal, round and reactive to light. Clear bilaterally, pupils equal, round and reactive to light. EOMI. negative raccoons

## 2019-04-03 NOTE — ED PROVIDER NOTE - CARE PLAN
Principal Discharge DX:	Near syncope  Secondary Diagnosis:	Injury of head, initial encounter  Secondary Diagnosis:	Contusion of face, initial encounter

## 2019-04-03 NOTE — ED PROVIDER NOTE - CLINICAL SUMMARY MEDICAL DECISION MAKING FREE TEXT BOX
74 y/o female, hx of Afib, loop recorder implanted for pending consideration for PPM, BIBA from home s/p near syncopal questionable syncopal episode associated with hitting head an right face. Neuro exam intact. Plan trauma alert initiated: CT head, c-spine XR chest, right elbow. EKG, labs including troponin. Monitor, observe, reassess. Admit to tele.

## 2019-04-03 NOTE — ED ADULT NURSE REASSESSMENT NOTE - NS ED NURSE REASSESS COMMENT FT1
Patient A&Ox4, resting comfortably in bed. VSS, reports 2/10 aching pain to R face; refusing pain medication. Patient instructed to inform us if pain increases in intensity. Denies dizziness, no s/s of distress present. Wait time explained, hospitalist consult pending. Pillows provided, safety & comfort measures in place. Will continue to monitor.

## 2019-04-03 NOTE — ED PROVIDER NOTE - GASTROINTESTINAL, MLM
Abdomen soft, non-tender, no guarding. Abdomen soft, non-tender, no guarding. Bowel sounds positive.

## 2019-04-03 NOTE — ED PROVIDER NOTE - MUSCULOSKELETAL, MLM
Spine appears normal, range of motion is not limited, no muscle or joint tenderness Spine appears normal, range of motion is not limited, no muscle or joint tenderness. neck nontender, afrom w/o pain. back and pelvis nontender, stable. no chest TTP. LAWS x4. No focal swelling TTP. right SLR 30+ degrees, left SLR 15 degrees w/o hip pain. bilateral decreased afrom due to pain, joints stable

## 2019-04-03 NOTE — ED ADULT NURSE REASSESSMENT NOTE - NS ED NURSE REASSESS COMMENT FT1
Patient remains as previously assessed. Resting comfortably, no s/s of distress present. Hand-off report to RN Chelsi, transport to 3N. Safety & comfort measures in place, hourly rounding on my time.

## 2019-04-03 NOTE — H&P ADULT - HISTORY OF PRESENT ILLNESS
73 y.o with PMH of RA on Cimzia, CVA,  s/p B/l TKR,  HTN, HLD, Fatty liver, pancreatic lesion presented to ED c/o syncopal episode; patient states that she became dizzy and fell and hit her forehead and Rt side of the face; patient had similar episodes in the past and she had a linq placed.       Past Medical History:  Afib    CVA (cerebral vascular accident)    Dizziness    Fatty liver    HTN (hypertension)    Pancreatic lesion    RA (rheumatoid arthritis)    Unsteady gait  due to unstable Left knee.     Past Surgical History:  Failed total left knee replacement  6/28/2016  H/O cataract extraction, right  1/2015  H/O colonoscopy    H/O dilation and curettage  5/2016  H/O hernia repair  1995  H/O total knee replacement, right  10/13/2016.     Family History:  Father  Family history of lung cancer     Mother  Still living? No   had Chf     Social History:  no smoking, no Etoh            REVIEW OF SYSTEMS:    CONSTITUTIONAL: No weakness, No fevers or chills  ENT: No ear ache, No sorethroat  NECK: No pain, No stiffness  RESPIRATORY: No cough, No wheezing, No hemoptysis; No dyspnea  CARDIOVASCULAR: No chest pain, No palpitations  GASTROINTESTINAL: No abd pain, No nausea, No vomiting, No hematemesis, No diarrhea or constipation. No melena, No hematochezia.  GENITOURINARY: No dysuria, No  hematuria  NEUROLOGICAL: No diplopia, No paresthesia, No motor dysfunction  MUSCULOSKELETAL: No arthralgia, No myalgia  SKIN: No rashes, or lesions   PSYCH: no anxiety, no suicidal ideation    All other review of systems is negative unless indicated above    Vital Signs Last 24 Hrs  T(C): 36.6 (03 Apr 2019 15:36), Max: 36.6 (03 Apr 2019 10:42)  T(F): 97.8 (03 Apr 2019 15:36), Max: 97.8 (03 Apr 2019 10:42)  HR: 77 (03 Apr 2019 15:41) (69 - 95)  BP: 129/94 (03 Apr 2019 15:41) (127/81 - 147/103)  BP(mean): 115 (03 Apr 2019 15:36) (115 - 115)  RR: 18 (03 Apr 2019 15:36) (17 - 18)  SpO2: 98% (03 Apr 2019 15:36) (96% - 98%)    PHYSICAL EXAM:    GENERAL: NAD, Well nourished  HEENT:  NC/AT, EOMI, PERRLA, No scleral icterus, Moist mucous membranes  NECK: Supple, No JVD  CNS:  Alert & Oriented X3, Motor Strength 5/5 B/L upper and lower extremities; DTRs 2+ intact   LUNG: Normal Breath sounds, Clear to auscultation bilaterally, No rales, No rhonchi, No wheezing  HEART: RRR; No murmurs, No rubs  ABDOMEN: +BS, ST/ND/NT  GENITOURINARY: Voiding, Bladder not distended  EXTREMITIES:  2+ Peripheral Pulses, No clubbing, No cyanosis, No tibial edema  MUSCULOSKELTAL: Joints normal ROM, No TTP, No effusion  VAGINAL: deferred  SKIN: no rashes  RECTAL: deferred, not indicated  BREAST: deferred                          12.7   6.46  )-----------( 242      ( 03 Apr 2019 10:59 )             39.3     04-03    140  |  106  |  15  ----------------------------<  127<H>  4.1   |  27  |  0.98    Ca    9.1      03 Apr 2019 10:59    TPro  7.4  /  Alb  3.7  /  TBili  0.5  /  DBili  x   /  AST  24  /  ALT  24  /  AlkPhos  129<H>  04-03    Vancomycin levels:   Cultures:     MEDICATIONS  (STANDING):  amLODIPine   Tablet 10 milliGRAM(s) Oral daily  apixaban 2.5 milliGRAM(s) Oral every 12 hours  predniSONE   Tablet 2.5 milliGRAM(s) Oral daily  sulfaSALAzine 1000 milliGRAM(s) Oral two times a day    MEDICATIONS  (PRN):      all labs reviewed  all imaging reviewed    Ekg: NSR, 1degree AVB    Assessment and Plan:    1. Syncope:  admit to telemetry  r/o arrhythmia  cardiology eval of linq  Trops x 3    2. RA:   Prednisone 2.5mg/day  Sulfasalazine 1000mg po Bid    3. Htn:   cw Norvasc    4. h/o Afib with elevated Chads:  c/w Apixaban for secondary CVA prophy

## 2019-04-04 RX ADMIN — APIXABAN 2.5 MILLIGRAM(S): 2.5 TABLET, FILM COATED ORAL at 06:17

## 2019-04-04 RX ADMIN — Medication 2.5 MILLIGRAM(S): at 06:18

## 2019-04-04 RX ADMIN — Medication 1000 MILLIGRAM(S): at 06:18

## 2019-04-04 RX ADMIN — Medication 1000 MILLIGRAM(S): at 18:28

## 2019-04-04 RX ADMIN — AMLODIPINE BESYLATE 10 MILLIGRAM(S): 2.5 TABLET ORAL at 06:17

## 2019-04-04 NOTE — PROGRESS NOTE ADULT - SUBJECTIVE AND OBJECTIVE BOX
73 y.o with PMH of RA on Cimzia, CVA,  s/p B/l TKR,  HTN, HLD, Fatty liver, pancreatic lesion presented to ED c/o syncopal episode; patient states that she became dizzy and fell and hit her forehead and Rt side of the face; patient had similar episodes in the past and she had a linq placed.    4.4: no distress          REVIEW OF SYSTEMS:    CONSTITUTIONAL: No weakness, No fevers or chills  ENT: No ear ache, No sorethroat  NECK: No pain, No stiffness  RESPIRATORY: No cough, No wheezing, No hemoptysis; No dyspnea  CARDIOVASCULAR: No chest pain, No palpitations  GASTROINTESTINAL: No abd pain, No nausea, No vomiting, No hematemesis, No diarrhea or constipation. No melena, No hematochezia.  GENITOURINARY: No dysuria, No  hematuria  NEUROLOGICAL: No diplopia, No paresthesia, No motor dysfunction  MUSCULOSKELETAL: No arthralgia, No myalgia  SKIN: No rashes, or lesions   PSYCH: no anxiety, no suicidal ideation    All other review of systems is negative unless indicated above    Vital Signs Last 24 Hrs  T(C): 37.2 (04 Apr 2019 11:22), Max: 37.2 (04 Apr 2019 11:22)  T(F): 98.9 (04 Apr 2019 11:22), Max: 98.9 (04 Apr 2019 11:22)  HR: 73 (04 Apr 2019 11:22) (69 - 77)  BP: 125/75 (04 Apr 2019 11:22) (123/70 - 152/84)  BP(mean): 115 (03 Apr 2019 15:36) (115 - 115)  RR: 18 (04 Apr 2019 11:22) (16 - 18)  SpO2: 95% (04 Apr 2019 11:22) (95% - 98%)    PHYSICAL EXAM:    GENERAL: NAD, Well nourished  HEENT:  NC/AT, EOMI, PERRLA, No scleral icterus, Moist mucous membranes  NECK: Supple, No JVD  CNS:  Alert & Oriented X3, Motor Strength 5/5 B/L upper and lower extremities; DTRs 2+ intact   LUNG: Normal Breath sounds, Clear to auscultation bilaterally, No rales, No rhonchi, No wheezing  HEART: RRR; No murmurs, No rubs  ABDOMEN: +BS, ST/ND/NT  GENITOURINARY: Voiding, Bladder not distended  EXTREMITIES:  2+ Peripheral Pulses, No clubbing, No cyanosis, No tibial edema  MUSCULOSKELTAL: Joints normal ROM, No TTP, No effusion  VAGINAL: deferred  SKIN: no rashes  RECTAL: deferred, not indicated  BREAST: deferred               Labs:                        12.7   6.46  )-----------( 242      ( 03 Apr 2019 10:59 )             39.3       140  |  106  |  15  ----------------------------<  127<H>  4.1   |  27  |  0.98    Ca    9.1      03 Apr 2019 10:59    TPro  7.4  /  Alb  3.7  /  TBili  0.5  /  DBili  x   /  AST  24  /  ALT  24  /  AlkPhos  129<H>  04-03           MEDICATIONS  (STANDING):  amLODIPine   Tablet 10 milliGRAM(s) Oral daily  predniSONE   Tablet 2.5 milliGRAM(s) Oral daily  sulfaSALAzine 1000 milliGRAM(s) Oral two times a day        all labs reviewed  all imaging reviewed    Ekg: NSR, 1degree AVB    Assessment and Plan:    1. Syncope:  admit to telemetry  r/o arrhythmia  cardiology eval of linq: had sinus pauses ~4sec  EP eval for PPM underway....apixaban on hold  Trops x 3 negative    2. RA:   Prednisone 2.5mg/day  Sulfasalazine 1000mg po Bid    3. Htn:   cw Norvasc    4. h/o Afib with elevated Chads:  Apixaban on hold

## 2019-04-04 NOTE — CONSULT NOTE ADULT - SUBJECTIVE AND OBJECTIVE BOX
74 y/o female who had syncope.  Her prodromal symptoms were dizziness prior to her traumatic fall.   In the past she had an ILR implanted.  She is SR presently on the telemetry monitor with t no pauses seen.       Past Medical History:  Afib    CVA (cerebral vascular accident)    Dizziness    Fatty liver    HTN (hypertension)    Pancreatic lesion    RA (rheumatoid arthritis)    Unsteady gait  due to unstable Left knee.    Past Surgical History:  Failed total left knee replacement  2016  H/O cataract extraction, right  2015  H/O colonoscopy    H/O dilation and curettage  2016  H/O hernia repair    H/O total knee replacement, right  10/13/2016.    Family History:  Father  Family history of lung cancer  Mother had Lung Cancer    Social History:  no smoking, no Etoh        REVIEW OF SYSTEMS:    CONSTITUTIONAL: No weakness, No fevers or chills  ENT: No ear ache, No sorethroat  NECK: No pain, No stiffness  RESPIRATORY: No cough, No wheezing, No hemoptysis; No dyspnea  CARDIOVASCULAR: No chest pain, No palpitations  GASTROINTESTINAL: No abd pain, No nausea, No vomiting, No hematemesis, No diarrhea or constipation. No melena, No hematochezia.  GENITOURINARY: No dysuria, No  hematuria  NEUROLOGICAL: No diplopia, No paresthesia, No motor dysfunction  MUSCULOSKELETAL: No arthralgia, No myalgia  SKIN: No rashes, or lesions   PSYCH: no anxiety, no suicidal ideation    All other review of systems is negative unless indicated above    Vital Signs Last 24 Hrs  T(C): 36.6 (2019 15:36), Max: 36.6 (2019 10:42)  T(F): 97.8 (2019 15:36), Max: 97.8 (2019 10:42)  HR: 77 (2019 15:41) (69 - 95)  BP: 129/94 (2019 15:41) (127/81 - 147/103)  BP(mean): 115 (2019 15:36) (115 - 115)  RR: 18 (2019 15:36) (17 - 18)  SpO2: 98% (2019 15:36) (96% - 98%)    PHYSICAL EXAM:    GENERAL: NAD, Well nourished  HEENT:  NC/AT, EOMI, PERRLA, No scleral icterus, Moist mucous membranes  NECK: Supple, No JVD  CNS:  Alert & Oriented X3, Motor Strength 5/5 B/L upper and lower extremities; DTRs 2+ intact   LUNG: Normal Breath sounds, Clear to auscultation bilaterally, No rales, No rhonchi, No wheezing  HEART: RRR; No murmurs, No rubs  ABDOMEN: +BS, ST/ND/NT  GENITOURINARY: Voiding, Bladder not distended  EXTREMITIES:  2+ Peripheral Pulses, No clubbing, No cyanosis, No tibial edema  MUSCULOSKELTAL: Joints normal ROM, No TTP, No effusion  VAGINAL: deferred  SKIN: no rashes  RECTAL: deferred, not indicated  BREAST: deferred                                 MEDICATIONS  (STANDING):  amLODIPine   Tablet 10 milliGRAM(s) Oral daily  predniSONE   Tablet 2.5 milliGRAM(s) Oral daily  sulfaSALAzine 1000 milliGRAM(s) Oral two times a day    MEDICATIONS  (PRN):      Allergies    No Known Allergies    Intolerances          LABS:                        12.7   6.46  )-----------( 242      ( 2019 10:59 )             39.3     04-    140  |  106  |  15  ----------------------------<  127<H>  4.1   |  27  |  0.98    Ca    9.1      2019 10:59    TPro  7.4  /  Alb  3.7  /  TBili  0.5  /  DBili  x   /  AST  24  /  ALT  24  /  AlkPhos  129<H>  04-03    PT/INR - ( 2019 10:59 )   PT: 15.4 sec;   INR: 1.37 ratio         PTT - ( 2019 10:59 )  PTT:34.5 sec  Urinalysis Basic - ( 2019 16:40 )    Color: Yellow / Appearance: Clear / S.005 / pH: x  Gluc: x / Ketone: Negative  / Bili: Negative / Urobili: Negative mg/dL   Blood: x / Protein: Negative mg/dL / Nitrite: Negative   Leuk Esterase: Trace / RBC: Negative /HPF / WBC 0-2   Sq Epi: x / Non Sq Epi: Occasional / Bacteria: Negative      CARDIAC MARKERS ( 2019 18:14 )  <0.015 ng/mL / x     / x     / x     / x      CARDIAC MARKERS ( 2019 14:08 )  <0.015 ng/mL / x     / x     / x     / x      CARDIAC MARKERS ( 2019 10:59 )  <0.015 ng/mL / x     / 76 U/L / x     / x                EKG:  SR at 81 BPM with LAD   TELE:  SR at 76 BPM with first degree HB 72 y/o female who had syncope.  Her prodromal symptoms were dizziness prior to her traumatic fall.   In the past she had an ILR implanted.  She is SR presently on the telemetry monitor with t no pauses seen.       Past Medical History:  Afib    CVA (cerebral vascular accident)    Dizziness    Fatty liver    HTN (hypertension)    Pancreatic lesion    RA (rheumatoid arthritis)    Unsteady gait  due to unstable Left knee.    Past Surgical History:  Failed total left knee replacement  2016  H/O cataract extraction, right  2015  H/O colonoscopy    H/O dilation and curettage  2016  H/O hernia repair    H/O total knee replacement, right  10/13/2016.    Family History:  Father  Family history of lung cancer  Mother had Lung Cancer    Social History:  no smoking, no Etoh        REVIEW OF SYSTEMS:    CONSTITUTIONAL: No weakness, No fevers or chills  ENT: No ear ache, No sorethroat  NECK: No pain, No stiffness  RESPIRATORY: No cough, No wheezing, No hemoptysis; No dyspnea  CARDIOVASCULAR: No chest pain, No palpitations  GASTROINTESTINAL: No abd pain, No nausea, No vomiting, No hematemesis, No diarrhea or constipation. No melena, No hematochezia.  GENITOURINARY: No dysuria, No  hematuria  NEUROLOGICAL: No diplopia, No paresthesia, No motor dysfunction  MUSCULOSKELETAL: No arthralgia, No myalgia  SKIN: No rashes, or lesions   PSYCH: no anxiety, no suicidal ideation    All other review of systems is negative unless indicated above    Vital Signs Last 24 Hrs  T(C): 36.6 (2019 15:36), Max: 36.6 (2019 10:42)  T(F): 97.8 (2019 15:36), Max: 97.8 (2019 10:42)  HR: 77 (2019 15:41) (69 - 95)  BP: 129/94 (2019 15:41) (127/81 - 147/103)  BP(mean): 115 (2019 15:36) (115 - 115)  RR: 18 (2019 15:36) (17 - 18)  SpO2: 98% (2019 15:36) (96% - 98%)    PHYSICAL EXAM:    GENERAL: NAD, Well nourished  HEENT:  NC/AT, EOMI, PERRLA, No scleral icterus, Moist mucous membranes  NECK: Supple, No JVD  CNS:  Alert & Oriented X3, Motor Strength 5/5 B/L upper and lower extremities; DTRs 2+ intact   LUNG: Normal Breath sounds, Clear to auscultation bilaterally, No rales, No rhonchi, No wheezing  HEART: RRR; No murmurs, No rubs  ABDOMEN: +BS, ST/ND/NT  GENITOURINARY: Voiding, Bladder not distended  EXTREMITIES:  2+ Peripheral Pulses, No clubbing, No cyanosis, No tibial edema  MUSCULOSKELTAL: Joints normal ROM, No TTP, No effusion  VAGINAL: deferred  SKIN: no rashes  RECTAL: deferred, not indicated  BREAST: deferred                                 MEDICATIONS  (STANDING):  amLODIPine   Tablet 10 milliGRAM(s) Oral daily  predniSONE   Tablet 2.5 milliGRAM(s) Oral daily  sulfaSALAzine 1000 milliGRAM(s) Oral two times a day    MEDICATIONS  (PRN):      Allergies    No Known Allergies    Intolerances          LABS:                        12.7   6.46  )-----------( 242      ( 2019 10:59 )             39.3     04-    140  |  106  |  15  ----------------------------<  127<H>  4.1   |  27  |  0.98    Ca    9.1      2019 10:59    TPro  7.4  /  Alb  3.7  /  TBili  0.5  /  DBili  x   /  AST  24  /  ALT  24  /  AlkPhos  129<H>  04-03    PT/INR - ( 2019 10:59 )   PT: 15.4 sec;   INR: 1.37 ratio         PTT - ( 2019 10:59 )  PTT:34.5 sec  Urinalysis Basic - ( 2019 16:40 )    Color: Yellow / Appearance: Clear / S.005 / pH: x  Gluc: x / Ketone: Negative  / Bili: Negative / Urobili: Negative mg/dL   Blood: x / Protein: Negative mg/dL / Nitrite: Negative   Leuk Esterase: Trace / RBC: Negative /HPF / WBC 0-2   Sq Epi: x / Non Sq Epi: Occasional / Bacteria: Negative      CARDIAC MARKERS ( 2019 18:14 )  <0.015 ng/mL / x     / x     / x     / x      CARDIAC MARKERS ( 2019 14:08 )  <0.015 ng/mL / x     / x     / x     / x      CARDIAC MARKERS ( 2019 10:59 )  <0.015 ng/mL / x     / 76 U/L / x     / x            EKG:  SR at 81 BPM with LAD   TELE:  SR at 76 BPM with first degree HB 72 y/o female who had syncope.  Her prodromal symptoms were dizziness prior to her traumatic fall.   She  has an ILR implanted.  She is SR presently on the telemetry monitor with t no pauses seen.       Past Medical History:  Afib    CVA (cerebral vascular accident)    Dizziness    Fatty liver    HTN (hypertension)    Pancreatic lesion    RA (rheumatoid arthritis)    Unsteady gait  due to unstable Left knee.    Past Surgical History:  Failed total left knee replacement  2016  H/O cataract extraction, right  2015  H/O colonoscopy    H/O dilation and curettage  2016  H/O hernia repair    H/O total knee replacement, right  10/13/2016.    Family History:  Father  Family history of lung cancer  Mother had Lung Cancer    Social History:  no smoking, no Etoh        REVIEW OF SYSTEMS:    CONSTITUTIONAL: No weakness, No fevers or chills  ENT: No ear ache, No sorethroat  NECK: No pain, No stiffness  RESPIRATORY: No cough, No wheezing, No hemoptysis; No dyspnea  CARDIOVASCULAR: No chest pain, No palpitations  GASTROINTESTINAL: No abd pain, No nausea, No vomiting, No hematemesis, No diarrhea or constipation. No melena, No hematochezia.  GENITOURINARY: No dysuria, No  hematuria  NEUROLOGICAL: No diplopia, No paresthesia, No motor dysfunction  MUSCULOSKELETAL: No arthralgia, No myalgia  SKIN: No rashes, or lesions   PSYCH: no anxiety, no suicidal ideation    All other review of systems is negative unless indicated above    Vital Signs Last 24 Hrs  T(C): 36.6 (2019 15:36), Max: 36.6 (2019 10:42)  T(F): 97.8 (2019 15:36), Max: 97.8 (2019 10:42)  HR: 77 (2019 15:41) (69 - 95)  BP: 129/94 (2019 15:41) (127/81 - 147/103)  BP(mean): 115 (2019 15:36) (115 - 115)  RR: 18 (2019 15:36) (17 - 18)  SpO2: 98% (2019 15:36) (96% - 98%)    PHYSICAL EXAM:    GENERAL: NAD, Well nourished  HEENT:  NC/AT, EOMI, PERRLA, No scleral icterus, Moist mucous membranes  NECK: Supple, No JVD  CNS:  Alert & Oriented X3, Motor Strength 5/5 B/L upper and lower extremities; DTRs 2+ intact   LUNG: Normal Breath sounds, Clear to auscultation bilaterally, No rales, No rhonchi, No wheezing  HEART: RRR; No murmurs, No rubs  ABDOMEN: +BS, ST/ND/NT  GENITOURINARY: Voiding, Bladder not distended  EXTREMITIES:  2+ Peripheral Pulses, No clubbing, No cyanosis, No tibial edema  MUSCULOSKELTAL: Joints normal ROM, No TTP, No effusion  VAGINAL: deferred  SKIN: no rashes  RECTAL: deferred, not indicated  BREAST: deferred                                 MEDICATIONS  (STANDING):  amLODIPine   Tablet 10 milliGRAM(s) Oral daily  predniSONE   Tablet 2.5 milliGRAM(s) Oral daily  sulfaSALAzine 1000 milliGRAM(s) Oral two times a day    MEDICATIONS  (PRN):      Allergies    No Known Allergies    Intolerances          LABS:                        12.7   6.46  )-----------( 242      ( 2019 10:59 )             39.3     04    140  |  106  |  15  ----------------------------<  127<H>  4.1   |  27  |  0.98    Ca    9.1      2019 10:59    TPro  7.4  /  Alb  3.7  /  TBili  0.5  /  DBili  x   /  AST  24  /  ALT  24  /  AlkPhos  129<H>  04-03    PT/INR - ( 2019 10:59 )   PT: 15.4 sec;   INR: 1.37 ratio         PTT - ( 2019 10:59 )  PTT:34.5 sec  Urinalysis Basic - ( 2019 16:40 )    Color: Yellow / Appearance: Clear / S.005 / pH: x  Gluc: x / Ketone: Negative  / Bili: Negative / Urobili: Negative mg/dL   Blood: x / Protein: Negative mg/dL / Nitrite: Negative   Leuk Esterase: Trace / RBC: Negative /HPF / WBC 0-2   Sq Epi: x / Non Sq Epi: Occasional / Bacteria: Negative      CARDIAC MARKERS ( 2019 18:14 )  <0.015 ng/mL / x     / x     / x     / x      CARDIAC MARKERS ( 2019 14:08 )  <0.015 ng/mL / x     / x     / x     / x      CARDIAC MARKERS ( 2019 10:59 )  <0.015 ng/mL / x     / 76 U/L / x     / x            EKG:  SR at 81 BPM with LAD   TELE:  SR at 76 BPM with first degree HB 74 y/o female who had syncope.  Her prodromal symptoms were dizziness prior to her traumatic fall.   She  has an ILR implanted.  She is SR presently on the telemetry monitor with  no pauses seen.       Past Medical History:  Afib    CVA (cerebral vascular accident)    Dizziness    Fatty liver    HTN (hypertension)    Pancreatic lesion    RA (rheumatoid arthritis)    Unsteady gait  due to unstable Left knee.    Past Surgical History:  Failed total left knee replacement  2016  H/O cataract extraction, right  2015  H/O colonoscopy    H/O dilation and curettage  2016  H/O hernia repair    H/O total knee replacement, right  10/13/2016.    Family History:  Father  Family history of lung cancer  Mother had Lung Cancer    Social History:  no smoking, no Etoh        REVIEW OF SYSTEMS:    CONSTITUTIONAL: No weakness, No fevers or chills  ENT: No ear ache, No sorethroat  NECK: No pain, No stiffness  RESPIRATORY: No cough, No wheezing, No hemoptysis; No dyspnea  CARDIOVASCULAR: No chest pain, No palpitations  GASTROINTESTINAL: No abd pain, No nausea, No vomiting, No hematemesis, No diarrhea or constipation. No melena, No hematochezia.  GENITOURINARY: No dysuria, No  hematuria  NEUROLOGICAL: No diplopia, No paresthesia, No motor dysfunction  MUSCULOSKELETAL: No arthralgia, No myalgia  SKIN: No rashes, or lesions   PSYCH: no anxiety, no suicidal ideation    All other review of systems is negative unless indicated above    Vital Signs Last 24 Hrs  T(C): 36.6 (2019 15:36), Max: 36.6 (2019 10:42)  T(F): 97.8 (2019 15:36), Max: 97.8 (2019 10:42)  HR: 77 (2019 15:41) (69 - 95)  BP: 129/94 (2019 15:41) (127/81 - 147/103)  BP(mean): 115 (2019 15:36) (115 - 115)  RR: 18 (2019 15:36) (17 - 18)  SpO2: 98% (2019 15:36) (96% - 98%)    PHYSICAL EXAM:    GENERAL: NAD, Well nourished  HEENT:  NC/AT, EOMI, PERRLA, No scleral icterus, Moist mucous membranes  NECK: Supple, No JVD  CNS:  Alert & Oriented X3, Motor Strength 5/5 B/L upper and lower extremities; DTRs 2+ intact   LUNG: Normal Breath sounds, Clear to auscultation bilaterally, No rales, No rhonchi, No wheezing  HEART: RRR; No murmurs, No rubs  ABDOMEN: +BS, ST/ND/NT  GENITOURINARY: Voiding, Bladder not distended  EXTREMITIES:  2+ Peripheral Pulses, No clubbing, No cyanosis, No tibial edema  MUSCULOSKELTAL: Joints normal ROM, No TTP, No effusion  VAGINAL: deferred  SKIN: no rashes  RECTAL: deferred, not indicated  BREAST: deferred                                 MEDICATIONS  (STANDING):  amLODIPine   Tablet 10 milliGRAM(s) Oral daily  predniSONE   Tablet 2.5 milliGRAM(s) Oral daily  sulfaSALAzine 1000 milliGRAM(s) Oral two times a day    MEDICATIONS  (PRN):      Allergies    No Known Allergies    Intolerances          LABS:                        12.7   6.46  )-----------( 242      ( 2019 10:59 )             39.3         140  |  106  |  15  ----------------------------<  127<H>  4.1   |  27  |  0.98    Ca    9.1      2019 10:59    TPro  7.4  /  Alb  3.7  /  TBili  0.5  /  DBili  x   /  AST  24  /  ALT  24  /  AlkPhos  129<H>  04-03    PT/INR - ( 2019 10:59 )   PT: 15.4 sec;   INR: 1.37 ratio         PTT - ( 2019 10:59 )  PTT:34.5 sec  Urinalysis Basic - ( 2019 16:40 )    Color: Yellow / Appearance: Clear / S.005 / pH: x  Gluc: x / Ketone: Negative  / Bili: Negative / Urobili: Negative mg/dL   Blood: x / Protein: Negative mg/dL / Nitrite: Negative   Leuk Esterase: Trace / RBC: Negative /HPF / WBC 0-2   Sq Epi: x / Non Sq Epi: Occasional / Bacteria: Negative      CARDIAC MARKERS ( 2019 18:14 )  <0.015 ng/mL / x     / x     / x     / x      CARDIAC MARKERS ( 2019 14:08 )  <0.015 ng/mL / x     / x     / x     / x      CARDIAC MARKERS ( 2019 10:59 )  <0.015 ng/mL / x     / 76 U/L / x     / x            EKG:  SR at 81 BPM with LAD   TELE:  SR at 76 BPM with first degree HB 72 y/o female who had an unwitnessed syncope.  Her prodromal symptoms were dizziness prior to her traumatic fall.   She  has an ILR implanted.  She is SR presently on the telemetry monitor with  no pauses seen. She has had 4 second pause documented on ILR in 2019.  She complains of daily lightheadedness.      Past Medical History:  Afib    CVA (cerebral vascular accident)    Dizziness    Fatty liver    HTN (hypertension)    Pancreatic lesion    RA (rheumatoid arthritis)    Unsteady gait  due to unstable Left knee.    Past Surgical History:  Failed total left knee replacement  2016  H/O cataract extraction, right  2015  H/O colonoscopy    H/O dilation and curettage  2016  H/O hernia repair    H/O total knee replacement, right  10/13/2016.    Family History:  Father  Family history of lung cancer  Mother had "Massive MI - at age 81 y/o    Social History:  no smoking, no Etoh  Just started a new diet i week ago. t per he Chiropractor:  Consists of one liquid meal replacement daily  and numerous multiple Vitamis        REVIEW OF SYSTEMS: Frequent episodes of lightheadedness (over the last year)    CONSTITUTIONAL: No weakness, No fevers or chills  ENT: No ear ache, No sorethroat  NECK: No pain, No stiffness  RESPIRATORY: No cough, No wheezing, No hemoptysis; No dyspnea  CARDIOVASCULAR: No chest pain, No palpitations  GASTROINTESTINAL: No abd pain, No nausea, No vomiting, No hematemesis, No diarrhea or constipation. No melena, No hematochezia.  GENITOURINARY: No dysuria, No  hematuria  NEUROLOGICAL: No diplopia, No paresthesia, No motor dysfunction  MUSCULOSKELETAL: No arthralgia, No myalgia  SKIN: No rashes, or lesions   PSYCH: no anxiety, no suicidal ideation    All other review of systems is negative unless indicated above    Vital Signs Last 24 Hrs  T(C): 36.6 (2019 15:36), Max: 36.6 (2019 10:42)  T(F): 97.8 (2019 15:36), Max: 97.8 (2019 10:42)  HR: 77 (2019 15:41) (69 - 95)  BP: 129/94 (2019 15:41) (127/81 - 147/103)  BP(mean): 115 (2019 15:36) (115 - 115)  RR: 18 (2019 15:36) (17 - 18)  SpO2: 98% (2019 15:36) (96% - 98%)    PHYSICAL EXAM:    GENERAL: NAD, Well nourished  HEENT:  NC/AT, EOMI, PERRLA, No scleral icterus, Moist mucous membranes  NECK: Supple, No JVD  CNS:  Alert & Oriented X3, Motor Strength 5/5 B/L upper and lower extremities; DTRs 2+ intact   LUNG: Normal Breath sounds, Clear to auscultation bilaterally, No rales, No rhonchi, No wheezing  HEART: RRR; No murmurs, No rubs  ABDOMEN: +BS, ST/ND/NT  GENITOURINARY: Voiding, Bladder not distended  EXTREMITIES:  2+ Peripheral Pulses, No clubbing, No cyanosis, No tibial edema  MUSCULOSKELTAL: Joints normal ROM, No TTP, No effusion  VAGINAL: deferred  SKIN: no rashes  RECTAL: deferred, not indicated  BREAST: deferred                         MEDICATIONS  (STANDING):  amLODIPine   Tablet 10 milliGRAM(s) Oral daily  predniSONE   Tablet 2.5 milliGRAM(s) Oral daily  sulfaSALAzine 1000 milliGRAM(s) Oral two times a day    MEDICATIONS  (PRN):      Allergies    No Known Allergies    Intolerances          LABS:                        12.7   6.46  )-----------( 242      ( 2019 10:59 )             39.3     04-03    140  |  106  |  15  ----------------------------<  127<H>  4.1   |  27  |  0.98    Ca    9.1      2019 10:59    TPro  7.4  /  Alb  3.7  /  TBili  0.5  /  DBili  x   /  AST  24  /  ALT  24  /  AlkPhos  129<H>  04-03    PT/INR - ( 2019 10:59 )   PT: 15.4 sec;   INR: 1.37 ratio         PTT - ( 2019 10:59 )  PTT:34.5 sec  Urinalysis Basic - ( 2019 16:40 )    Color: Yellow / Appearance: Clear / S.005 / pH: x  Gluc: x / Ketone: Negative  / Bili: Negative / Urobili: Negative mg/dL   Blood: x / Protein: Negative mg/dL / Nitrite: Negative   Leuk Esterase: Trace / RBC: Negative /HPF / WBC 0-2   Sq Epi: x / Non Sq Epi: Occasional / Bacteria: Negative      CARDIAC MARKERS ( 2019 18:14 )  <0.015 ng/mL / x     / x     / x     / x      CARDIAC MARKERS ( 2019 14:08 )  <0.015 ng/mL / x     / x     / x     / x      CARDIAC MARKERS ( 2019 10:59 )  <0.015 ng/mL / x     / 76 U/L / x     / x            EKG:  SR at 81 BPM with LAD   TELE:  SR at 76 BPM with first degree HB 72 y/o female who had an unwitnessed syncope.  Her prodromal symptoms were dizziness prior to her traumatic fall.   She  has an ILR implanted.  She is SR presently on the telemetry monitor with  no pauses seen. She has had 4 second pause documented on ILR in 2019.  She complains of daily lightheadedness.  Over the last week she started a new diet but hasn't lost any significant weight.      Past Medical History:  Afib    CVA (cerebral vascular accident)    Dizziness    Fatty liver    HTN (hypertension)    Pancreatic lesion    RA (rheumatoid arthritis)    Unsteady gait  due to unstable Left knee.    Past Surgical History:  Failed total left knee replacement  2016  H/O cataract extraction, right  2015  H/O colonoscopy    H/O dilation and curettage  2016  H/O hernia repair    H/O total knee replacement, right  10/13/2016.    Family History:  Father  Family history of lung cancer  Mother had "Massive MI - at age 81 y/o    Social History:  no smoking, no Etoh  Just started a new diet i week ago. t per he Chiropractor:  Consists of one liquid meal replacement daily  and numerous multiple Vitamis        REVIEW OF SYSTEMS: Frequent episodes of lightheadedness (over the last year)    CONSTITUTIONAL: No weakness, No fevers or chills  ENT: No ear ache, No sorethroat  NECK: No pain, No stiffness  RESPIRATORY: No cough, No wheezing, No hemoptysis; No dyspnea  CARDIOVASCULAR: No chest pain, No palpitations  GASTROINTESTINAL: No abd pain, No nausea, No vomiting, No hematemesis, No diarrhea or constipation. No melena, No hematochezia.  GENITOURINARY: No dysuria, No  hematuria  NEUROLOGICAL: No diplopia, No paresthesia, No motor dysfunction  MUSCULOSKELETAL: No arthralgia, No myalgia  SKIN: No rashes, or lesions   PSYCH: no anxiety, no suicidal ideation    All other review of systems is negative unless indicated above    Vital Signs Last 24 Hrs  T(C): 36.6 (2019 15:36), Max: 36.6 (2019 10:42)  T(F): 97.8 (2019 15:36), Max: 97.8 (2019 10:42)  HR: 77 (2019 15:41) (69 - 95)  BP: 129/94 (2019 15:41) (127/81 - 147/103)  BP(mean): 115 (2019 15:36) (115 - 115)  RR: 18 (2019 15:36) (17 - 18)  SpO2: 98% (2019 15:36) (96% - 98%)    PHYSICAL EXAM:    GENERAL: NAD, Well nourished  HEENT:  NC/AT, EOMI, PERRLA, No scleral icterus, Moist mucous membranes  NECK: Supple, No JVD  CNS:  Alert & Oriented X3, Motor Strength 5/5 B/L upper and lower extremities; DTRs 2+ intact   LUNG: Normal Breath sounds, Clear to auscultation bilaterally, No rales, No rhonchi, No wheezing  HEART: RRR; No murmurs, No rubs  ABDOMEN: +BS, ST/ND/NT  GENITOURINARY: Voiding, Bladder not distended  EXTREMITIES:  2+ Peripheral Pulses, No clubbing, No cyanosis, No tibial edema  MUSCULOSKELTAL: Joints normal ROM, No TTP, No effusion  VAGINAL: deferred  SKIN: no rashes  RECTAL: deferred, not indicated  BREAST: deferred                         MEDICATIONS  (STANDING):  amLODIPine   Tablet 10 milliGRAM(s) Oral daily  predniSONE   Tablet 2.5 milliGRAM(s) Oral daily  sulfaSALAzine 1000 milliGRAM(s) Oral two times a day    MEDICATIONS  (PRN):      Allergies    No Known Allergies    Intolerances          LABS:                        12.7   6.46  )-----------( 242      ( 2019 10:59 )             39.3     04-03    140  |  106  |  15  ----------------------------<  127<H>  4.1   |  27  |  0.98    Ca    9.1      2019 10:59    TPro  7.4  /  Alb  3.7  /  TBili  0.5  /  DBili  x   /  AST  24  /  ALT  24  /  AlkPhos  129<H>  04-03    PT/INR - ( 2019 10:59 )   PT: 15.4 sec;   INR: 1.37 ratio         PTT - ( 2019 10:59 )  PTT:34.5 sec  Urinalysis Basic - ( 2019 16:40 )    Color: Yellow / Appearance: Clear / S.005 / pH: x  Gluc: x / Ketone: Negative  / Bili: Negative / Urobili: Negative mg/dL   Blood: x / Protein: Negative mg/dL / Nitrite: Negative   Leuk Esterase: Trace / RBC: Negative /HPF / WBC 0-2   Sq Epi: x / Non Sq Epi: Occasional / Bacteria: Negative      CARDIAC MARKERS ( 2019 18:14 )  <0.015 ng/mL / x     / x     / x     / x      CARDIAC MARKERS ( 2019 14:08 )  <0.015 ng/mL / x     / x     / x     / x      CARDIAC MARKERS ( 2019 10:59 )  <0.015 ng/mL / x     / 76 U/L / x     / x            EKG:  SR at 81 BPM with LAD   TELE:  SR at 76 BPM with first degree HB

## 2019-04-04 NOTE — CONSULT NOTE ADULT - ASSESSMENT
Syncope- Check orthostatic BP readings.  No arrythmias including bradycardia noted on tele so far.  EP team consulted.    HTN- Continue current meds.  BP optimal.    Atrial fibrillation - continue ELlliquis.  Now in sinus rythm.    Other medical issues- Management per primary team.   Thank you for allowing me to participate in the care of this patient. Please feel free to contact me with any questions.

## 2019-04-04 NOTE — CONSULT NOTE ADULT - SUBJECTIVE AND OBJECTIVE BOX
Patient is a 73y old  Female who presents with a chief complaint of syncope.    HPI:  73 y.o with PMH of RA on Cimzia, CVA,  s/p B/l TKR,  HTN, HLD, Fatty liver, pancreatic lesion presented to ED c/o syncopal episode; patient states that she became dizzy and fell and hit her forehead and Rt side of the face; patient had similar episodes in the past and she had a linq placed.    Pt denies any dizziness now.   She had pauses in the past that were short and was asked to get sleep study and she was adamant that she did not want sleep study.        Past Medical History:  Afib    CVA (cerebral vascular accident)    Dizziness    Fatty liver    HTN (hypertension)    Pancreatic lesion    RA (rheumatoid arthritis)    Unsteady gait  due to unstable Left knee.     Past Surgical History:  Failed total left knee replacement  2016  H/O cataract extraction, right  2015  H/O colonoscopy    H/O dilation and curettage  2016  H/O hernia repair    H/O total knee replacement, right  10/13/2016.     Family History:  Father  Family history of lung cancer     Mother  Still living? No   had Chf     Social History:  no smoking, no Etoh            REVIEW OF SYSTEMS:    CONSTITUTIONAL: No weakness, No fevers or chills  ENT: No ear ache, No sorethroat  NECK: No pain, No stiffness  RESPIRATORY: No cough, No wheezing, No hemoptysis; No dyspnea  CARDIOVASCULAR: No chest pain, No palpitations c/o syncope  GASTROINTESTINAL: No abd pain, No nausea, No vomiting, No hematemesis, No diarrhea or constipation. No melena, No hematochezia.  GENITOURINARY: No dysuria, No  hematuria  NEUROLOGICAL: No diplopia, No paresthesia, No motor dysfunction  MUSCULOSKELETAL: No arthralgia, No myalgia  SKIN: No rashes, or lesions   PSYCH: no anxiety, no suicidal ideation    All other review of systems is negative unless indicated above    ICU Vital Signs Last 24 Hrs  T(C): 36.5 (2019 05:14), Max: 37.1 (2019 17:48)  T(F): 97.7 (2019 05:14), Max: 98.7 (2019 17:48)  HR: 77 (2019 05:14) (69 - 95)  BP: 152/84 (2019 05:14) (123/70 - 152/84)  BP(mean): 115 (2019 15:36) (115 - 115)  ABP: --  ABP(mean): --  RR: 16 (2019 05:14) (16 - 18)  SpO2: 97% (2019 05:14) (96% - 98%)                  PAST MEDICAL & SURGICAL HISTORY:  CVA (cerebral vascular accident)  Afib  Pancreatic lesion  Fatty liver  Unsteady gait: due to unstable Left knee  Dizziness  HTN (hypertension)  RA (rheumatoid arthritis)  H/O colonoscopy  H/O dilation and curettage: 2016  H/O hernia repair:   H/O total knee replacement, right: 10/13/2016  Failed total left knee replacement: 2016  H/O cataract extraction, right: 2015      MEDICATIONS  (STANDING):  amLODIPine   Tablet 10 milliGRAM(s) Oral daily  apixaban 2.5 milliGRAM(s) Oral every 12 hours  predniSONE   Tablet 2.5 milliGRAM(s) Oral daily  sulfaSALAzine 1000 milliGRAM(s) Oral two times a day    MEDICATIONS  (PRN):      FAMILY HISTORY:  Family history of heart disease (Mother)  Family history of lung cancer (Father)          REVIEW OF SYSTEMS:  CONSTITUTIONAL:    No fatigue, malaise, lethargy.  No fever or chills.  HEENT:  Eyes:  No visual changes.     ENT:  No epistaxis.  No sinus pain.    RESPIRATORY:  No cough.  No wheeze.  No hemoptysis.  No shortness of breath.  CARDIOVASCULAR:  No chest pains.  No palpitations. No shortness of breath, No orthopnea or PND. c/osyncope.   GASTROINTESTINAL:  No abdominal pain.  No nausea or vomiting.    GENITOURINARY:    No hematuria.    MUSCULOSKELETAL:  No musculoskeletal pain.  No joint swelling.  No arthritis.  NEUROLOGICAL:  No tingling or numbness or weakness.  PSYCHIATRIC:  No confusion            PHYSICAL EXAM-    Constitutional: no acute distress    Head: Head is normocephalic and atraumatic.      Neck: There are strong carotid pulses bilaterally. No JVD.     Cardiovascular: Regular rate and rhythm without S3, S4. No murmurs or rubs are appreciated.      Respiratory: Breathsounds are normal. No rales. No wheezing.    Abdomen: Soft, nontender, nondistended with positive bowel sounds.      Extremity: No tenderness. No  pitting edema     Neurologic: The patient is alert and oriented.      Skin: No rash, no obvious lesions noted.      Psychiatric: The patient appears to be emotionally stable.      INTERPRETATION OF TELEMETRY: SR 60-90/minPVCs, first degree AV block    ECG: Sinus rythm , L axis, first degree AV block    I&O's Detail      LABS:                        12.7   6.46  )-----------( 242      ( 2019 10:59 )             39.3         140  |  106  |  15  ----------------------------<  127<H>  4.1   |  27  |  0.98    Ca    9.1      2019 10:59    TPro  7.4  /  Alb  3.7  /  TBili  0.5  /  DBili  x   /  AST  24  /  ALT  24  /  AlkPhos  129<H>      CARDIAC MARKERS ( 2019 18:14 )  <0.015 ng/mL / x     / x     / x     / x      CARDIAC MARKERS ( 2019 14:08 )  <0.015 ng/mL / x     / x     / x     / x      CARDIAC MARKERS ( 2019 10:59 )  <0.015 ng/mL / x     / 76 U/L / x     / x          PT/INR - ( 2019 10:59 )   PT: 15.4 sec;   INR: 1.37 ratio         PTT - ( 2019 10:59 )  PTT:34.5 sec  Urinalysis Basic - ( 2019 16:40 )    Color: Yellow / Appearance: Clear / S.005 / pH: x  Gluc: x / Ketone: Negative  / Bili: Negative / Urobili: Negative mg/dL   Blood: x / Protein: Negative mg/dL / Nitrite: Negative   Leuk Esterase: Trace / RBC: Negative /HPF / WBC 0-2   Sq Epi: x / Non Sq Epi: Occasional / Bacteria: Negative      I&O's Summary    BNP  RADIOLOGY & ADDITIONAL STUDIES:  < from: Xray Chest 1 View AP/PA. (19 @ 12:14) >  FINDINGS/  IMPRESSION:      Wireless loop recorder in the left chest wall is reidentified.    The lungs are clear.  No pleural abnormality is seen.      Cardiomediastinal silhouette is intact.                  GORDY SALES M.D., ATTENDING RADIOLOGIST  This document has been electronically signed. Apr  3 2019  3:08PM        < end of copied text >

## 2019-04-05 PROCEDURE — 71045 X-RAY EXAM CHEST 1 VIEW: CPT | Mod: 26

## 2019-04-05 PROCEDURE — 33208 INSRT HEART PM ATRIAL & VENT: CPT

## 2019-04-05 PROCEDURE — 93010 ELECTROCARDIOGRAM REPORT: CPT

## 2019-04-05 RX ORDER — ACETAMINOPHEN 500 MG
650 TABLET ORAL EVERY 6 HOURS
Qty: 0 | Refills: 0 | Status: DISCONTINUED | OUTPATIENT
Start: 2019-04-05 | End: 2019-04-06

## 2019-04-05 RX ORDER — CEFAZOLIN SODIUM 1 G
1000 VIAL (EA) INJECTION EVERY 8 HOURS
Qty: 0 | Refills: 0 | Status: COMPLETED | OUTPATIENT
Start: 2019-04-05 | End: 2019-04-06

## 2019-04-05 RX ORDER — CEFAZOLIN SODIUM 1 G
2000 VIAL (EA) INJECTION ONCE
Qty: 0 | Refills: 0 | Status: COMPLETED | OUTPATIENT
Start: 2019-04-05 | End: 2019-04-05

## 2019-04-05 RX ORDER — OXYCODONE AND ACETAMINOPHEN 5; 325 MG/1; MG/1
1 TABLET ORAL EVERY 6 HOURS
Qty: 0 | Refills: 0 | Status: DISCONTINUED | OUTPATIENT
Start: 2019-04-05 | End: 2019-04-06

## 2019-04-05 RX ORDER — OXYCODONE AND ACETAMINOPHEN 5; 325 MG/1; MG/1
1 TABLET ORAL ONCE
Qty: 0 | Refills: 0 | Status: DISCONTINUED | OUTPATIENT
Start: 2019-04-05 | End: 2019-04-05

## 2019-04-05 RX ADMIN — Medication 1000 MILLIGRAM(S): at 18:38

## 2019-04-05 RX ADMIN — Medication 100 MILLIGRAM(S): at 15:35

## 2019-04-05 RX ADMIN — Medication 2.5 MILLIGRAM(S): at 05:43

## 2019-04-05 RX ADMIN — OXYCODONE AND ACETAMINOPHEN 1 TABLET(S): 5; 325 TABLET ORAL at 22:55

## 2019-04-05 RX ADMIN — Medication 1000 MILLIGRAM(S): at 22:58

## 2019-04-05 RX ADMIN — Medication 650 MILLIGRAM(S): at 18:00

## 2019-04-05 RX ADMIN — AMLODIPINE BESYLATE 10 MILLIGRAM(S): 2.5 TABLET ORAL at 05:42

## 2019-04-05 RX ADMIN — Medication 1000 MILLIGRAM(S): at 05:43

## 2019-04-05 RX ADMIN — OXYCODONE AND ACETAMINOPHEN 1 TABLET(S): 5; 325 TABLET ORAL at 18:38

## 2019-04-05 RX ADMIN — OXYCODONE AND ACETAMINOPHEN 1 TABLET(S): 5; 325 TABLET ORAL at 23:30

## 2019-04-05 NOTE — PROGRESS NOTE ADULT - ASSESSMENT
1. Syncope:  admit to telemetry  cardiology eval of linq: had sinus pauses ~4sec  EP eval appreciated - for PPm today  Trops x 3 negative  - eliquis on hold, restart post procedure    2. RA:   Prednisone 2.5mg/day  Sulfasalazine 1000mg po Bid    3. Htn:   cw Norvasc    4. h/o Afib with elevated Chads:  Apixaban on hold    dispo: likely d/c home in AM post PPM placement

## 2019-04-05 NOTE — PACU DISCHARGE NOTE - COMMENTS
Report given to Mikaela ELLIS 3N; placed on telemetry monitor with rhythm verified by Britany Monitor tech 3N; verbal and written instructions to pt and pt's  and PPM paired with home monitor by [facility] and  verifying instructions. pt transported via stretcher by transporter

## 2019-04-05 NOTE — PROGRESS NOTE ADULT - SUBJECTIVE AND OBJECTIVE BOX
Patient is a 73y old  Female who presents with a chief complaint of syncope.    HPI:  73 y.o with PMH of RA on Cimzia, CVA,  s/p B/l TKR,  HTN, HLD, Fatty liver, pancreatic lesion presented to ED c/o syncopal episode; patient states that she became dizzy and fell and hit her forehead and Rt side of the face; patient had similar episodes in the past and she had a linq placed.    Pt denies any dizziness now.   She had pauses in the past that were short and was asked to get sleep study and she was adamant that she did not want sleep study.    /- pt seen and examined this am . Pt denies any symptoms this am.     Past Medical History:  Afib    CVA (cerebral vascular accident)    Dizziness    Fatty liver    HTN (hypertension)    Pancreatic lesion    RA (rheumatoid arthritis)    Unsteady gait  due to unstable Left knee.     Past Surgical History:  Failed total left knee replacement  2016  H/O cataract extraction, right  2015  H/O colonoscopy    H/O dilation and curettage  2016  H/O hernia repair    H/O total knee replacement, right  10/13/2016.     Family History:  Father  Family history of lung cancer     Mother  Still living? No   had Chf     Social History:  no smoking, no Etoh            REVIEW OF SYSTEMS:    CONSTITUTIONAL: No weakness, No fevers or chills  ENT: No ear ache, No sorethroat  NECK: No pain, No stiffness  RESPIRATORY: No cough, No wheezing, No hemoptysis; No dyspnea  CARDIOVASCULAR: No chest pain, No palpitations c/o syncope  GASTROINTESTINAL: No abd pain, No nausea, No vomiting, No hematemesis, No diarrhea or constipation. No melena, No hematochezia.  GENITOURINARY: No dysuria, No  hematuria  NEUROLOGICAL: No diplopia, No paresthesia, No motor dysfunction  MUSCULOSKELETAL: No arthralgia, No myalgia  SKIN: No rashes, or lesions   PSYCH: no anxiety, no suicidal ideation    All other review of systems is negative unless indicated above    ICU Vital Signs Last 24 Hrs  T(C): 36.5 (2019 05:14), Max: 37.1 (2019 17:48)  T(F): 97.7 (2019 05:14), Max: 98.7 (2019 17:48)  HR: 77 (2019 05:14) (69 - 95)  BP: 152/84 (2019 05:14) (123/70 - 152/84)  BP(mean): 115 (2019 15:36) (115 - 115)  ABP: --  ABP(mean): --  RR: 16 (2019 05:14) (16 - 18)  SpO2: 97% (2019 05:14) (96% - 98%)                  PAST MEDICAL & SURGICAL HISTORY:  CVA (cerebral vascular accident)  Afib  Pancreatic lesion  Fatty liver  Unsteady gait: due to unstable Left knee  Dizziness  HTN (hypertension)  RA (rheumatoid arthritis)  H/O colonoscopy  H/O dilation and curettage: 2016  H/O hernia repair:   H/O total knee replacement, right: 10/13/2016  Failed total left knee replacement: 2016  H/O cataract extraction, right: 2015      MEDICATIONS  (STANDING):  amLODIPine   Tablet 10 milliGRAM(s) Oral daily  apixaban 2.5 milliGRAM(s) Oral every 12 hours  predniSONE   Tablet 2.5 milliGRAM(s) Oral daily  sulfaSALAzine 1000 milliGRAM(s) Oral two times a day    MEDICATIONS  (PRN):      FAMILY HISTORY:  Family history of heart disease (Mother)  Family history of lung cancer (Father)          REVIEW OF SYSTEMS:  CONSTITUTIONAL:    No fatigue, malaise, lethargy.  No fever or chills.  HEENT:  Eyes:  No visual changes.     ENT:  No epistaxis.  No sinus pain.    RESPIRATORY:  No cough.  No wheeze.  No hemoptysis.  No shortness of breath.  CARDIOVASCULAR:  No chest pains.  No palpitations. No shortness of breath, No orthopnea or PND. c/osyncope.   GASTROINTESTINAL:  No abdominal pain.  No nausea or vomiting.    GENITOURINARY:    No hematuria.    MUSCULOSKELETAL:  No musculoskeletal pain.  No joint swelling.  No arthritis.  NEUROLOGICAL:  No tingling or numbness or weakness.  PSYCHIATRIC:  No confusion            PHYSICAL EXAM-    Constitutional: no acute distress    Head: Head is normocephalic and atraumatic.      Neck: There are strong carotid pulses bilaterally. No JVD.     Cardiovascular: Regular rate and rhythm without S3, S4. No murmurs or rubs are appreciated.      Respiratory: Breathsounds are normal. No rales. No wheezing.    Abdomen: Soft, nontender, nondistended with positive bowel sounds.      Extremity: No tenderness. No  pitting edema     Neurologic: The patient is alert and oriented.      Skin: No rash, no obvious lesions noted.      Psychiatric: The patient appears to be emotionally stable.      INTERPRETATION OF TELEMETRY: SR 60-90/minPVCs, first degree AV block    ECG: Sinus rythm , L axis, first degree AV block    I&O's Detail      LABS:                        12.7   6.46  )-----------( 242      ( 2019 10:59 )             39.3         140  |  106  |  15  ----------------------------<  127<H>  4.1   |  27  |  0.98    Ca    9.1      2019 10:59    TPro  7.4  /  Alb  3.7  /  TBili  0.5  /  DBili  x   /  AST  24  /  ALT  24  /  AlkPhos  129<H>      CARDIAC MARKERS ( 2019 18:14 )  <0.015 ng/mL / x     / x     / x     / x      CARDIAC MARKERS ( 2019 14:08 )  <0.015 ng/mL / x     / x     / x     / x      CARDIAC MARKERS ( 2019 10:59 )  <0.015 ng/mL / x     / 76 U/L / x     / x          PT/INR - ( 2019 10:59 )   PT: 15.4 sec;   INR: 1.37 ratio         PTT - ( 2019 10:59 )  PTT:34.5 sec  Urinalysis Basic - ( 2019 16:40 )    Color: Yellow / Appearance: Clear / S.005 / pH: x  Gluc: x / Ketone: Negative  / Bili: Negative / Urobili: Negative mg/dL   Blood: x / Protein: Negative mg/dL / Nitrite: Negative   Leuk Esterase: Trace / RBC: Negative /HPF / WBC 0-2   Sq Epi: x / Non Sq Epi: Occasional / Bacteria: Negative      I&O's Summary    BNP  RADIOLOGY & ADDITIONAL STUDIES:  < from: Xray Chest 1 View AP/PA. (19 @ 12:14) >  FINDINGS/  IMPRESSION:      Wireless loop recorder in the left chest wall is reidentified.    The lungs are clear.  No pleural abnormality is seen.      Cardiomediastinal silhouette is intact.                  GORDY SALES M.D., ATTENDING RADIOLOGIST  This document has been electronically signed. Apr  3 2019  3:08PM        < end of copied text >

## 2019-04-05 NOTE — PROGRESS NOTE ADULT - SUBJECTIVE AND OBJECTIVE BOX
Patient is a 73y old  Female who presents with a chief complaint of syncope.    HPI:  73 y.o with PMH of RA on Cimzia, CVA,  s/p B/l TKR,  HTN, HLD, Fatty liver, pancreatic lesion presented to ED c/o syncopal episode; patient states that she became dizzy and fell and hit her forehead and Rt side of the face; patient had similar episodes in the past and she had a linq placed.    : Pt seen and examined. Chart reviewed. Feels well. No complaints. Awaiting PPM placement today.     ROS: negative unless stated above in HPI      ICU Vital Signs Last 24 Hrs  T(C): 36.5 (2019 05:14), Max: 37.1 (2019 17:48)  T(F): 97.7 (2019 05:14), Max: 98.7 (2019 17:48)  HR: 77 (2019 05:14) (69 - 95)  BP: 152/84 (2019 05:14) (123/70 - 152/84)  BP(mean): 115 (2019 15:36) (115 - 115)  RR: 16 (2019 05:14) (16 - 18)  SpO2: 97% (2019 05:14) (96% - 98%)      MEDICATIONS  (STANDING):  amLODIPine   Tablet 10 milliGRAM(s) Oral daily  predniSONE   Tablet 2.5 milliGRAM(s) Oral daily  sulfaSALAzine 1000 milliGRAM(s) Oral two times a day    MEDICATIONS  (PRN):      PHYSICAL EXAM-    Constitutional: no acute distress    Head: Head is normocephalic and atraumatic.      Neck: There are strong carotid pulses bilaterally. No JVD.     Cardiovascular: Regular rate and rhythm without S3, S4. No murmurs or rubs are appreciated.      Respiratory: Breathsounds are normal. No rales. No wheezing.    Abdomen: Soft, nontender, nondistended with positive bowel sounds.      Extremity: No tenderness. No  pitting edema     Neurologic: The patient is alert and oriented.      Skin: No rash, no obvious lesions noted.      Psychiatric: The patient appears to be emotionally stable.      INTERPRETATION OF TELEMETRY: SR 60-90/minPVCs, first degree AV block    ECG: Sinus rythm , L axis, first degree AV block    I&O's Detail      Labs    CARDIAC MARKERS ( 2019 18:14 )  <0.015 ng/mL / x     / x     / x     / x      CARDIAC MARKERS ( 2019 14:08 )  <0.015 ng/mL / x     / x     / x     / x            CAPILLARY BLOOD GLUCOSE          Urinalysis Basic - ( 2019 16:40 )    Color: Yellow / Appearance: Clear / S.005 / pH: x  Gluc: x / Ketone: Negative  / Bili: Negative / Urobili: Negative mg/dL   Blood: x / Protein: Negative mg/dL / Nitrite: Negative   Leuk Esterase: Trace / RBC: Negative /HPF / WBC 0-2   Sq Epi: x / Non Sq Epi: Occasional / Bacteria: Negative          BNP  RADIOLOGY & ADDITIONAL STUDIES:  < from: Xray Chest 1 View AP/PA. (19 @ 12:14) >  FINDINGS/  IMPRESSION:      Wireless loop recorder in the left chest wall is reidentified.    The lungs are clear.  No pleural abnormality is seen.      Cardiomediastinal silhouette is intact.              GORDY SALES M.D., ATTENDING RADIOLOGIST  This document has been electronically signed. Apr  3 2019  3:08PM        < end of copied text >

## 2019-04-05 NOTE — PROGRESS NOTE ADULT - ASSESSMENT
Syncope-  orthostatic BP readings noted. no orthostasis.   No arrythmias including bradycardia noted on tele so far.  For PPM today.     HTN- Continue current meds.  BP optimal.    Atrial fibrillation - continue ELlliquis- further recommendation for procedure per EP team.   Now in sinus rythm.    Other medical issues- Management per primary team.   Thank you for allowing me to participate in the care of this patient. Please feel free to contact me with any questions.

## 2019-04-06 ENCOUNTER — TRANSCRIPTION ENCOUNTER (OUTPATIENT)
Age: 74
End: 2019-04-06

## 2019-04-06 VITALS
OXYGEN SATURATION: 94 % | DIASTOLIC BLOOD PRESSURE: 76 MMHG | RESPIRATION RATE: 18 BRPM | HEART RATE: 94 BPM | TEMPERATURE: 98 F | SYSTOLIC BLOOD PRESSURE: 134 MMHG

## 2019-04-06 DIAGNOSIS — I48.91 UNSPECIFIED ATRIAL FIBRILLATION: ICD-10-CM

## 2019-04-06 LAB
ANION GAP SERPL CALC-SCNC: 8 MMOL/L — SIGNIFICANT CHANGE UP (ref 5–17)
BASOPHILS # BLD AUTO: 0.05 K/UL — SIGNIFICANT CHANGE UP (ref 0–0.2)
BASOPHILS NFR BLD AUTO: 0.8 % — SIGNIFICANT CHANGE UP (ref 0–2)
BUN SERPL-MCNC: 19 MG/DL — SIGNIFICANT CHANGE UP (ref 7–23)
CALCIUM SERPL-MCNC: 8.6 MG/DL — SIGNIFICANT CHANGE UP (ref 8.5–10.1)
CHLORIDE SERPL-SCNC: 108 MMOL/L — SIGNIFICANT CHANGE UP (ref 96–108)
CO2 SERPL-SCNC: 27 MMOL/L — SIGNIFICANT CHANGE UP (ref 22–31)
CREAT SERPL-MCNC: 0.76 MG/DL — SIGNIFICANT CHANGE UP (ref 0.5–1.3)
EOSINOPHIL # BLD AUTO: 0.19 K/UL — SIGNIFICANT CHANGE UP (ref 0–0.5)
EOSINOPHIL NFR BLD AUTO: 3 % — SIGNIFICANT CHANGE UP (ref 0–6)
GLUCOSE SERPL-MCNC: 92 MG/DL — SIGNIFICANT CHANGE UP (ref 70–99)
HCT VFR BLD CALC: 37.1 % — SIGNIFICANT CHANGE UP (ref 34.5–45)
HGB BLD-MCNC: 12 G/DL — SIGNIFICANT CHANGE UP (ref 11.5–15.5)
IMM GRANULOCYTES NFR BLD AUTO: 0.3 % — SIGNIFICANT CHANGE UP (ref 0–1.5)
LYMPHOCYTES # BLD AUTO: 1.55 K/UL — SIGNIFICANT CHANGE UP (ref 1–3.3)
LYMPHOCYTES # BLD AUTO: 24.3 % — SIGNIFICANT CHANGE UP (ref 13–44)
MCHC RBC-ENTMCNC: 27.8 PG — SIGNIFICANT CHANGE UP (ref 27–34)
MCHC RBC-ENTMCNC: 32.3 GM/DL — SIGNIFICANT CHANGE UP (ref 32–36)
MCV RBC AUTO: 85.9 FL — SIGNIFICANT CHANGE UP (ref 80–100)
MONOCYTES # BLD AUTO: 0.79 K/UL — SIGNIFICANT CHANGE UP (ref 0–0.9)
MONOCYTES NFR BLD AUTO: 12.4 % — SIGNIFICANT CHANGE UP (ref 2–14)
NEUTROPHILS # BLD AUTO: 3.78 K/UL — SIGNIFICANT CHANGE UP (ref 1.8–7.4)
NEUTROPHILS NFR BLD AUTO: 59.2 % — SIGNIFICANT CHANGE UP (ref 43–77)
NRBC # BLD: 0 /100 WBCS — SIGNIFICANT CHANGE UP (ref 0–0)
PLATELET # BLD AUTO: 203 K/UL — SIGNIFICANT CHANGE UP (ref 150–400)
POTASSIUM SERPL-MCNC: 3.9 MMOL/L — SIGNIFICANT CHANGE UP (ref 3.5–5.3)
POTASSIUM SERPL-SCNC: 3.9 MMOL/L — SIGNIFICANT CHANGE UP (ref 3.5–5.3)
RBC # BLD: 4.32 M/UL — SIGNIFICANT CHANGE UP (ref 3.8–5.2)
RBC # FLD: 14.9 % — HIGH (ref 10.3–14.5)
SODIUM SERPL-SCNC: 143 MMOL/L — SIGNIFICANT CHANGE UP (ref 135–145)
WBC # BLD: 6.38 K/UL — SIGNIFICANT CHANGE UP (ref 3.8–10.5)
WBC # FLD AUTO: 6.38 K/UL — SIGNIFICANT CHANGE UP (ref 3.8–10.5)

## 2019-04-06 PROCEDURE — 93010 ELECTROCARDIOGRAM REPORT: CPT

## 2019-04-06 RX ORDER — SULFASALAZINE 500 MG
2 TABLET ORAL
Qty: 0 | Refills: 0 | COMMUNITY
Start: 2019-04-06

## 2019-04-06 RX ADMIN — AMLODIPINE BESYLATE 10 MILLIGRAM(S): 2.5 TABLET ORAL at 06:38

## 2019-04-06 RX ADMIN — OXYCODONE AND ACETAMINOPHEN 1 TABLET(S): 5; 325 TABLET ORAL at 04:49

## 2019-04-06 RX ADMIN — Medication 1000 MILLIGRAM(S): at 06:38

## 2019-04-06 RX ADMIN — Medication 2.5 MILLIGRAM(S): at 06:38

## 2019-04-06 RX ADMIN — OXYCODONE AND ACETAMINOPHEN 1 TABLET(S): 5; 325 TABLET ORAL at 13:12

## 2019-04-06 RX ADMIN — Medication 1000 MILLIGRAM(S): at 08:22

## 2019-04-06 RX ADMIN — OXYCODONE AND ACETAMINOPHEN 1 TABLET(S): 5; 325 TABLET ORAL at 05:25

## 2019-04-06 RX ADMIN — OXYCODONE AND ACETAMINOPHEN 1 TABLET(S): 5; 325 TABLET ORAL at 12:15

## 2019-04-06 NOTE — DISCHARGE NOTE PROVIDER - CARE PROVIDER_API CALL
Melissa Burger)  Cardiovascular Disease; Internal Medicine  [address] [place] [address]  Phone: [phone]  Fax: [phone]  Follow Up Time:

## 2019-04-06 NOTE — DISCHARGE NOTE NURSING/CASE MANAGEMENT/SOCIAL WORK - NSDCPNDISPN_GEN_ALL_CORE
Opioids not applicable/not prescribed/Activities of daily living, including home environment that might     exacerbate pain or reduce effectiveness of the pain management plan of care as well as strategies to address these issues/Side effects of pain management treatment/Safe use, storage and disposal of opioids when prescribed/Education provided on the pain management plan of care

## 2019-04-06 NOTE — PROGRESS NOTE ADULT - ASSESSMENT
s/pDual lead PPM implantation POD #1  observed overnight on tele-SR with first degree AVB HR 80-90s no ectopy detected  Left device im[plant site with prineal drsg dry and intact no bleeding or hematoma detected  Patient feels well.  Denies chest pain, shortness of breath, dizziness or palpitations at this time.    CXR-No PTX    EKG:NSR@80   TELE:no ectopy, NSR 80s  GENERAL: appears well, OOB to chair  CV: RRR, S1 S2, no murmur, rub, clicks or gallop noted  RESP: LCTA bilaterally, no wheeze, no rhonchi or crackles noted  GI/: soft, NT/ND  NEURO: AOx4, no focal deficits  EXTREMITIES: no edema, clubbing or cyanosis noted  PROCEDURE SITE:  Left device im[plant site with prineal drsg dry and intact no bleeding or hematoma detected

## 2019-04-06 NOTE — PROGRESS NOTE ADULT - SUBJECTIVE AND OBJECTIVE BOX
Nurse Practitioner Progress note:   s/pDual lead PPM implantation POD #1  observed overnight on tele-SR with first degree AVB HR 80-90s no ectopy detected  Left device im[plant site with prineal drsg dry and intact no bleeding or hematoma detected  Patient feels well.  Denies chest pain, shortness of breath, dizziness or palpitations at this time.    CXR-No PTX  Device interrogated this am-normal device function adequate pacing and sensing thresholds  EKG:NSR@80   TELE:no ectopy, NSR 80s  GENERAL: appears well, OOB to chair  CV: RRR, S1 S2, no murmur, rub, clicks or gallop noted  RESP: LCTA bilaterally, no wheeze, no rhonchi or crackles noted  GI/: soft, NT/ND  NEURO: AOx4, no focal deficits  EXTREMITIES: no edema, clubbing or cyanosis noted  PROCEDURE SITE:  Left device im[plant site with prineal drsg dry and intact no bleeding or hematoma detected      T(C): 36.7 (04-06-19 @ 09:54), Max: 36.7 (04-06-19 @ 04:34)  HR: 94 (04-06-19 @ 09:54) (73 - 94)  BP: 134/76 (04-06-19 @ 09:54) (134/66 - 168/80)  RR: 18 (04-06-19 @ 09:54) (16 - 20)  SpO2: 94% (04-06-19 @ 09:54) (94% - 98%)  Wt(kg): --  CBC Full  -  ( 06 Apr 2019 05:28 )  WBC Count : 6.38 K/uL  RBC Count : 4.32 M/uL  Hemoglobin : 12.0 g/dL  Hematocrit : 37.1 %  Platelet Count - Automated : 203 K/uL  Mean Cell Volume : 85.9 fl  Mean Cell Hemoglobin : 27.8 pg  Mean Cell Hemoglobin Concentration : 32.3 gm/dL  Auto Neutrophil # : 3.78 K/uL  Auto Lymphocyte # : 1.55 K/uL  Auto Monocyte # : 0.79 K/uL  Auto Eosinophil # : 0.19 K/uL  Auto Basophil # : 0.05 K/uL  Auto Neutrophil % : 59.2 %  Auto Lymphocyte % : 24.3 %  Auto Monocyte % : 12.4 %  Auto Eosinophil % : 3.0 %  Auto Basophil % : 0.8 %    04-06    143  |  108  |  19  ----------------------------<  92  3.9   |  27  |  0.76    Ca    8.6      06 Apr 2019 05:28              MEDICATIONS  (STANDING):  amLODIPine   Tablet 10 milliGRAM(s) Oral daily  predniSONE   Tablet 2.5 milliGRAM(s) Oral daily  sulfaSALAzine 1000 milliGRAM(s) Oral two times a day    MEDICATIONS  (PRN):  acetaminophen   Tablet .. 650 milliGRAM(s) Oral every 6 hours PRN Mild Pain (1 - 3)  oxyCODONE    5 mG/acetaminophen 325 mG 1 Tablet(s) Oral every 6 hours PRN Moderate Pain (4 - 6)        HPI:  73 y.o with PMH of RA on Cimzia, CVA,  s/p B/l TKR,  HTN, HLD, Fatty liver, pancreatic lesion presented to ED c/o syncopal episode; patient states that she became dizzy and fell and hit her forehead and Rt side of the face; patient had similar episodes in the past and she had a linq placed.       Past Medical History:  Afib    CVA (cerebral vascular accident)    Dizziness    Fatty liver    HTN (hypertension)    Pancreatic lesion    RA (rheumatoid arthritis)    Unsteady gait  due to unstable Left knee.     Past Surgical History:  Failed total left knee replacement  6/28/2016  H/O cataract extraction, right  1/2015  H/O colonoscopy    H/O dilation and curettage  5/2016  H/O hernia repair  1995  H/O total knee replacement, right  10/13/2016.     Family History:  Father  Family history of lung cancer     Mother  Still living? No   had Chf     Social History:  no smoking, no Etoh            REVIEW OF SYSTEMS:    CONSTITUTIONAL: No weakness, No fevers or chills  ENT: No ear ache, No sorethroat  NECK: No pain, No stiffness  RESPIRATORY: No cough, No wheezing, No hemoptysis; No dyspnea  CARDIOVASCULAR: No chest pain, No palpitations  GASTROINTESTINAL: No abd pain, No nausea, No vomiting, No hematemesis, No diarrhea or constipation. No melena, No hematochezia.  GENITOURINARY: No dysuria, No  hematuria  NEUROLOGICAL: No diplopia, No paresthesia, No motor dysfunction  MUSCULOSKELETAL: No arthralgia, No myalgia  SKIN: No rashes, or lesions   PSYCH: no anxiety, no suicidal ideation    All other review of systems is negative unless indicated above    Vital Signs Last 24 Hrs  T(C): 36.6 (03 Apr 2019 15:36), Max: 36.6 (03 Apr 2019 10:42)  T(F): 97.8 (03 Apr 2019 15:36), Max: 97.8 (03 Apr 2019 10:42)  HR: 77 (03 Apr 2019 15:41) (69 - 95)  BP: 129/94 (03 Apr 2019 15:41) (127/81 - 147/103)  BP(mean): 115 (03 Apr 2019 15:36) (115 - 115)  RR: 18 (03 Apr 2019 15:36) (17 - 18)  SpO2: 98% (03 Apr 2019 15:36) (96% - 98%)    PHYSICAL EXAM:    GENERAL: NAD, Well nourished  HEENT:  NC/AT, EOMI, PERRLA, No scleral icterus, Moist mucous membranes  NECK: Supple, No JVD  CNS:  Alert & Oriented X3, Motor Strength 5/5 B/L upper and lower extremities; DTRs 2+ intact   LUNG: Normal Breath sounds, Clear to auscultation bilaterally, No rales, No rhonchi, No wheezing  HEART: RRR; No murmurs, No rubs  ABDOMEN: +BS, ST/ND/NT  GENITOURINARY: Voiding, Bladder not distended  EXTREMITIES:  2+ Peripheral Pulses, No clubbing, No cyanosis, No tibial edema  MUSCULOSKELTAL: Joints normal ROM, No TTP, No effusion  VAGINAL: deferred  SKIN: no rashes  RECTAL: deferred, not indicated  BREAST: deferred                          12.7   6.46  )-----------( 242      ( 03 Apr 2019 10:59 )             39.3     04-03    140  |  106  |  15  ----------------------------<  127<H>  4.1   |  27  |  0.98    Ca    9.1      03 Apr 2019 10:59    TPro  7.4  /  Alb  3.7  /  TBili  0.5  /  DBili  x   /  AST  24  /  ALT  24  /  AlkPhos  129<H>  04-03    Vancomycin levels:   Cultures:     MEDICATIONS  (STANDING):  amLODIPine   Tablet 10 milliGRAM(s) Oral daily  apixaban 2.5 milliGRAM(s) Oral every 12 hours  predniSONE   Tablet 2.5 milliGRAM(s) Oral daily  sulfaSALAzine 1000 milliGRAM(s) Oral two times a day    MEDICATIONS  (PRN):      all labs reviewed  all imaging reviewed    Ekg: NSR, 1degree AVB    Assessment and Plan:    1. Syncope:  admit to telemetry  r/o arrhythmia  cardiology eval of linq  Trops x 3    2. RA:   Prednisone 2.5mg/day  Sulfasalazine 1000mg po Bid    3. Htn:   cw Norvasc    4. h/o Afib with elevated Chads:  c/w Apixaban for secondary CVA prophy (03 Apr 2019 16:16)    now s/p dual lead PPM implantation POD #1    ASSESSMENT/PLAN:        -OOB to chair, then ambulate ad chaim on tele  -continue preprocedure meds, resume eliquis this evening  -Encourage PO fluids  -Plan of care discussed with patient,  and MD  -labs, EKG and procedure site assessed  -Follow-up with attending Marthao  within 1-2 weeks

## 2019-04-06 NOTE — DISCHARGE NOTE NURSING/CASE MANAGEMENT/SOCIAL WORK - NSDCDPATPORTLINK_GEN_ALL_CORE
You can access the [facility] Patient Portal, offered by [facility], by registering with the following website: [email or web address]

## 2019-04-06 NOTE — PROGRESS NOTE ADULT - PROBLEM SELECTOR PLAN 1
-OOB / discharge home today  -continue preprocedure meds, resume eliquis this evening  -Encourage PO fluids  -Plan of care discussed with patient,  and MD  -labs, EKG and procedure site assessed  -Follow-up with attending Maccaro  within 1-2 weeks

## 2019-04-06 NOTE — DISCHARGE NOTE PROVIDER - NSDCCPCAREPLAN_GEN_ALL_CORE_FT
PRINCIPAL DISCHARGE DIAGNOSIS  Diagnosis: Near syncope  Assessment and Plan of Treatment:       SECONDARY DISCHARGE DIAGNOSES  Diagnosis: Contusion of face, initial encounter  Assessment and Plan of Treatment:     Diagnosis: Injury of head, initial encounter  Assessment and Plan of Treatment:

## 2019-04-06 NOTE — DISCHARGE NOTE PROVIDER - HOSPITAL COURSE
73 y.o with PMH of RA on Cimzia, CVA,  s/p B/l TKR,  HTN, HLD, Fatty liver, pancreatic lesion presented to ED c/o syncopal episode; patient states that she became dizzy and fell and hit her forehead and Rt side of the face; patient had similar episodes in the past and she had a linq placed.        4/5: Pt seen and examined. Chart reviewed. Feels well. No complaints. Awaiting PPM placement today.         ROS: negative unless stated above in HPI            ICU Vital Signs Last 24 Hrs    T(C): 36.5 (04 Apr 2019 05:14), Max: 37.1 (03 Apr 2019 17:48)    T(F): 97.7 (04 Apr 2019 05:14), Max: 98.7 (03 Apr 2019 17:48)    HR: 77 (04 Apr 2019 05:14) (69 - 95)    BP: 152/84 (04 Apr 2019 05:14) (123/70 - 152/84)    BP(mean): 115 (03 Apr 2019 15:36) (115 - 115)    RR: 16 (04 Apr 2019 05:14) (16 - 18)    SpO2: 97% (04 Apr 2019 05:14) (96% - 98%)            MEDICATIONS  (STANDING):    amLODIPine   Tablet 10 milliGRAM(s) Oral daily    predniSONE   Tablet 2.5 milliGRAM(s) Oral daily    sulfaSALAzine 1000 milliGRAM(s) Oral two times a day        MEDICATIONS  (PRN):            PHYSICAL EXAM-        Constitutional: no acute distress        Head: Head is normocephalic and atraumatic.          Neck: There are strong carotid pulses bilaterally. No JVD.         Cardiovascular: Regular rate and rhythm without S3, S4. No murmurs or rubs are appreciated.          Respiratory: Breathsounds are normal. No rales. No wheezing.        Abdomen: Soft, nontender, nondistended with positive bowel sounds.          Extremity: No tenderness. No  pitting edema         Neurologic: The patient is alert and oriented.         s/p permanent pacemaker placemaker

## 2019-04-23 ENCOUNTER — APPOINTMENT (OUTPATIENT)
Dept: ELECTROPHYSIOLOGY | Facility: CLINIC | Age: 74
End: 2019-04-23

## 2019-04-30 ENCOUNTER — APPOINTMENT (OUTPATIENT)
Dept: NEUROLOGY | Facility: CLINIC | Age: 74
End: 2019-04-30
Payer: MEDICARE

## 2019-04-30 VITALS
SYSTOLIC BLOOD PRESSURE: 108 MMHG | BODY MASS INDEX: 23.19 KG/M2 | HEIGHT: 68 IN | WEIGHT: 153 LBS | DIASTOLIC BLOOD PRESSURE: 68 MMHG | HEART RATE: 88 BPM

## 2019-04-30 DIAGNOSIS — Z87.898 PERSONAL HISTORY OF OTHER SPECIFIED CONDITIONS: ICD-10-CM

## 2019-04-30 PROCEDURE — 99214 OFFICE O/P EST MOD 30 MIN: CPT

## 2019-04-30 NOTE — REVIEW OF SYSTEMS
[Abnormal Sensation] : an abnormal sensation [Dizziness] : dizziness [Arthralgias] : arthralgias [Joint Pain] : joint pain [Negative] : Heme/Lymph [Arm Weakness] : no arm weakness [Hand Weakness] : no hand weakness [Leg Weakness] : no leg weakness [Difficulty Walking] : no difficulty walking [de-identified] : Improved focal weakness

## 2019-04-30 NOTE — DISCUSSION/SUMMARY
[FreeTextEntry1] : Patient with chronic atrial fibrillation right CVA with residual left hemiparesis improved symptoms since last visit.\par \par Underwent permanent pacemaker syncopal episode recently.\par \par Continue present medications.\par \par Follow up with you in cardiology for other medical problems.\par \par Follow up with rheumatology for rheumatoid arthritis.\par \par Return for reevaluation 6 months or as needed.\par \par Patient education provided regarding stroke in secondary stroke prevention.

## 2019-04-30 NOTE — PHYSICAL EXAM
[General Appearance - Alert] : alert [General Appearance - In No Acute Distress] : in no acute distress [Oriented To Time, Place, And Person] : oriented to person, place, and time [Impaired Insight] : insight and judgment were intact [Affect] : the affect was normal [Person] : oriented to person [Place] : oriented to place [Time] : oriented to time [Concentration Intact] : normal concentrating ability [Visual Intact] : visual attention was ~T not ~L decreased [Naming Objects] : no difficulty naming common objects [Repeating Phrases] : no difficulty repeating a phrase [Writing A Sentence] : no difficulty writing a sentence [Fluency] : fluency intact [Comprehension] : comprehension intact [Reading] : reading intact [Past History] : adequate knowledge of personal past history [Cranial Nerves Optic (II)] : visual acuity intact bilaterally,  visual fields full to confrontation, pupils equal round and reactive to light [Cranial Nerves Oculomotor (III)] : extraocular motion intact [Cranial Nerves Trigeminal (V)] : facial sensation intact symmetrically [Cranial Nerves Facial (VII)] : face symmetrical [Cranial Nerves Vestibulocochlear (VIII)] : hearing was intact bilaterally [Cranial Nerves Glossopharyngeal (IX)] : tongue and palate midline [Cranial Nerves Accessory (XI - Cranial And Spinal)] : head turning and shoulder shrug symmetric [Cranial Nerves Hypoglossal (XII)] : there was no tongue deviation with protrusion [Motor Strength] : muscle strength was normal in all four extremities [No Muscle Atrophy] : normal bulk in all four extremities [Sensation Tactile Decrease] : light touch was intact [Romberg's Sign] : a positive Romberg's sign was present [Limited Balance] : the patient's balance was impaired [2+] : Ankle jerk left 2+ [Sclera] : the sclera and conjunctiva were normal [PERRL With Normal Accommodation] : pupils were equal in size, round, reactive to light, with normal accommodation [Extraocular Movements] : extraocular movements were intact [Outer Ear] : the ears and nose were normal in appearance [Oropharynx] : the oropharynx was normal [Neck Appearance] : the appearance of the neck was normal [Neck Cervical Mass (___cm)] : no neck mass was observed [Jugular Venous Distention Increased] : there was no jugular-venous distention [Thyroid Diffuse Enlargement] : the thyroid was not enlarged [Thyroid Nodule] : there were no palpable thyroid nodules [Auscultation Breath Sounds / Voice Sounds] : lungs were clear to auscultation bilaterally [Heart Rate And Rhythm] : heart rate was normal and rhythm regular [Heart Sounds] : normal S1 and S2 [Heart Sounds Gallop] : no gallops [Murmurs] : no murmurs [Heart Sounds Pericardial Friction Rub] : no pericardial rub [Full Pulse] : the pedal pulses are present [Edema] : there was no peripheral edema [Bowel Sounds] : normal bowel sounds [Abdomen Soft] : soft [Abdomen Tenderness] : non-tender [Abdomen Mass (___ Cm)] : no abdominal mass palpated [No CVA Tenderness] : no ~M costovertebral angle tenderness [No Spinal Tenderness] : no spinal tenderness [Abnormal Walk] : normal gait [Nail Clubbing] : no clubbing  or cyanosis of the fingernails [Motor Tone] : muscle strength and tone were normal [Skin Color & Pigmentation] : normal skin color and pigmentation [Skin Turgor] : normal skin turgor [] : no rash [Past-pointing] : there was no past-pointing [Tremor] : no tremor present [Plantar Reflex Right Only] : normal on the right [Plantar Reflex Left Only] : normal on the left [FreeTextEntry6] : No focal weakness [FreeTextEntry8] : Minimal difficulties with walking  [FreeTextEntry9] : Slightly brisk on left [FreeTextEntry1] : Multiple surgeries both knees limited difficulty with walking left LE

## 2019-04-30 NOTE — HISTORY OF PRESENT ILLNESS
[FreeTextEntry1] : She is 73-year-old patient coming here for a followup evaluation for right CVA with left hemiparesis with chronic atrial fibrillation with underlying rheumatoid arthritis currently taking Eliquis, recently had a syncopal episode and had permanent pacemaker placed in April in [facility]. Patient still complaining about pain involving left shoulder area post procedure.\par \par Difficulty tolerating methotrexate and Cimzia and xelzan in the past currently not taking any other medications except prednisone and sulfasalzeine.\par \par Denies of any other new complaints except sleep apnea for which she is supposed to use CPAP treatments which she is not using it. Persistent arthralgias and joint pains. Being followed by cardiology and rheumatologist.

## 2019-04-30 NOTE — REASON FOR VISIT
[Follow-Up: _____] : a [unfilled] follow-up visit [FreeTextEntry1] : Follow up for pt with [facility] with CVA and left hemiparesis

## 2019-04-30 NOTE — CONSULT LETTER
[Dear  ___] : Dear  [unfilled], [Consult Letter:] : I had the pleasure of evaluating your patient, [unfilled]. [Please see my note below.] : Please see my note below. [Consult Closing:] : Thank you very much for allowing me to participate in the care of this patient.  If you have any questions, please do not hesitate to contact me. [Sincerely,] : Sincerely, [DrTalon  ___] : Dr. OLIVAREZ

## 2019-05-28 ENCOUNTER — RX RENEWAL (OUTPATIENT)
Age: 74
End: 2019-05-28

## 2019-06-17 ENCOUNTER — APPOINTMENT (OUTPATIENT)
Dept: RHEUMATOLOGY | Facility: CLINIC | Age: 74
End: 2019-06-17
Payer: MEDICARE

## 2019-06-17 VITALS
HEART RATE: 80 BPM | HEIGHT: 68 IN | DIASTOLIC BLOOD PRESSURE: 76 MMHG | OXYGEN SATURATION: 95 % | WEIGHT: 153 LBS | SYSTOLIC BLOOD PRESSURE: 123 MMHG | BODY MASS INDEX: 23.19 KG/M2

## 2019-06-17 PROCEDURE — 99214 OFFICE O/P EST MOD 30 MIN: CPT

## 2019-06-17 RX ORDER — ATORVASTATIN CALCIUM 40 MG/1
40 TABLET, FILM COATED ORAL
Qty: 30 | Refills: 0 | Status: DISCONTINUED | COMMUNITY
Start: 2017-11-03 | End: 2019-06-17

## 2019-06-17 NOTE — HISTORY OF PRESENT ILLNESS
[FreeTextEntry1] : Alda returns for a followup for her rheumatoid arthritis after March. She is currently using sulfasalazine 2 g daily. She is using prednisone 2.5 mg daily. She was supposed to wean off the prednisone but was not aware that she had 2.\par \par Since her last visit with me she passed out at home, she now has a pacemaker put in. She continues to use the anticoagulation. She states that intermittently she will use Aleve. She rates her current joint pain as a 3/10. She admits to fatigue. Which he rates at 5/10. She has not been exercising, she has stopped working. She is concerned about a new nodule on her left thumb. She wants to know what the next step is for her rheumatoid arthritis.\par \par Last year her bone density test was normal.\par \par She has seen pulmonary, she was not told that she had to repeat a CT chest. She also has an appointment to repeat the MRI of the pancreas.\par \par She denies prolonged morning stiffness. She has a good appetite and denies weight loss. She denies fevers or chills or night sweats.

## 2019-06-17 NOTE — ASSESSMENT
[FreeTextEntry1] : 73-year-old  female with multiple medical problems including hypertension, osteoarthritis status post bilateral knee replacement, left hemiparesis, on anticoagulation, recent pelvic fracture, and rheumatoid arthritis.\par \par Rheumatoid arthritis-\par based on her history she was diagnosed about 3 years ago based on joint pains and abnormal blood work.  She was steroid dependent and was taken off the steroids once she had the pelvic fracture. \par  Prior to seeing me she has tried Plaquenil. With me she initially tried cimzia followed by xeljanz in combination with methotrexate. We then resumed the cimzia. A few months ago she was hospitalized with pneumonia, at that time it was unclear whether she had pneumonia versus COPD versus interstitial lung disease or side effect of methotrexate. She continues to stay off both the TNF inhibitors and the methotrexate.\par \par She is currently on sulfasalazine 2 g daily. She defers use of any additional medications. The issue is that she continues to use steroids albeit small dose. She is currently on 2.5 mg. To wean off steroids over next month and will reassess her sx. \par Given the presence of a possible new nodule I am concerned that her rheumatoid arthritis is progressing.\par \par Nonspecific interstitial pneumonitis-\par She was admitted to [facility] in December 2018 with pneumonia, she also has superimposed NSIP most likely related to the rheumatoid arthritis. As per pulmonologist, it is unclear if she had to toxicity from methotrexate but we discontinued it.\par \par Her last CT chest as per pulmonary is much better. For now we will continue with prednisone as treatment for the rheumatoid arthritis as well as the pulmonary issue. If need be Rituxan can be used as it will treat both the ILD as well as the rheumatoid arthritis. We'll continue clinical surveillance for now. She is to review literature/. \par \par Steroid use-\par Risks and benefits of steroids were d/w patient including but not limited to weight gain, diabetes, HTN, avascular necrosis, osteoporosis, glaucoma, cataract, infections and immunosuppression.\par \par \par f/u 4 weeks

## 2019-06-17 NOTE — PHYSICAL EXAM
[General Appearance - Alert] : alert [General Appearance - Well Nourished] : well nourished [General Appearance - Well Developed] : well developed [Sclera] : the sclera and conjunctiva were normal [Oropharynx] : the oropharynx was normal [Neck Appearance] : the appearance of the neck was normal [Respiration, Rhythm And Depth] : normal respiratory rhythm and effort [Heart Sounds] : normal S1 and S2 [Full Pulse] : the pedal pulses are present [Abdomen Tenderness] : non-tender [Edema] : there was no peripheral edema [Cervical Lymph Nodes Enlarged Anterior Bilaterally] : anterior cervical [No CVA Tenderness] : no ~M costovertebral angle tenderness [Supraclavicular Lymph Nodes Enlarged Bilaterally] : supraclavicular [No Spinal Tenderness] : no spinal tenderness [Abnormal Walk] : normal gait [Nail Clubbing] : no clubbing  or cyanosis of the fingernails [Motor Tone] : muscle strength and tone were normal [FreeTextEntry1] : antalgic gait, overall improved/ , bony changes hands, knees b/l TKR, hips and ankles and shoulders FROM, no active synovitis, POM ankles, left thumb with ? new nodule, bony changes hands c/w OA [] : no rash [Deep Tendon Reflexes (DTR)] : deep tendon reflexes were 2+ and symmetric [Motor Exam] : the motor exam was normal [Impaired Insight] : insight and judgment were intact [Oriented To Time, Place, And Person] : oriented to person, place, and time [Affect] : the affect was normal

## 2019-06-17 NOTE — REVIEW OF SYSTEMS
[Chills] : no chills [Fever] : no fever [Feeling Poorly] : not feeling poorly [Eye Pain] : no eye pain [Feeling Tired] : feeling tired [Dry Eyes] : no dryness of the eyes [Loss Of Hearing] : no hearing loss [Lower Ext Edema] : no lower extremity edema [Chest Pain] : no chest pain [Shortness Of Breath] : no shortness of breath [Cough] : no cough [SOB on Exertion] : no shortness of breath during exertion [Abdominal Pain] : no abdominal pain [Dysuria] : no dysuria [Joint Pain] : joint pain [Arthralgias] : arthralgias [Joint Stiffness] : no joint stiffness [Joint Swelling] : no joint swelling [Skin Lesions] : no skin lesions [Dizziness] : no dizziness [Fainting] : no fainting [Limb Weakness] : no limb weakness [Difficulty Walking] : difficulty walking [Anxiety] : no anxiety [Muscle Weakness] : no muscle weakness [Easy Bruising] : no tendency for easy bruising [Depression] : no depression

## 2019-07-08 NOTE — PATIENT PROFILE ADULT - NSPROGENBLOODRESTRICT_GEN_A_NUR
If he has bleeding that won't stop he needs to go and get his nose packed. Packing is to stay in place for 5 days. Make sure no NSAIDs  
Done, patient called back.   Patient informed of Dr. Deluca response.    Patient was scheduled for appointment tomorrow however he was instructed to remain of his baby aspirin until he sees Dr. Pandey tomorrow.  
Was in ER yesterday Had a bad nosebleed Went to ER states he was cauterized But started bleeding again in the night Used kleenex to keep it at bay Stopped and started throughout the night    Please call  
none

## 2019-07-09 ENCOUNTER — APPOINTMENT (OUTPATIENT)
Dept: RHEUMATOLOGY | Facility: CLINIC | Age: 74
End: 2019-07-09
Payer: MEDICARE

## 2019-07-09 VITALS
WEIGHT: 153 LBS | SYSTOLIC BLOOD PRESSURE: 108 MMHG | BODY MASS INDEX: 23.19 KG/M2 | DIASTOLIC BLOOD PRESSURE: 70 MMHG | HEART RATE: 46 BPM | OXYGEN SATURATION: 97 % | HEIGHT: 68 IN

## 2019-07-09 PROCEDURE — 96372 THER/PROPH/DIAG INJ SC/IM: CPT

## 2019-07-09 PROCEDURE — 99215 OFFICE O/P EST HI 40 MIN: CPT | Mod: 25

## 2019-07-09 RX ORDER — METHYLPRED ACET/NACL,ISO-OS/PF 80 MG/ML
80 VIAL (ML) INJECTION
Qty: 1 | Refills: 0 | Status: COMPLETED | OUTPATIENT
Start: 2019-07-09

## 2019-07-09 RX ADMIN — METHYLPREDNISOLONE ACETATE MG/ML: 80 INJECTION, SUSPENSION INTRA-ARTICULAR; INTRALESIONAL; INTRAMUSCULAR; SOFT TISSUE at 00:00

## 2019-07-09 NOTE — REVIEW OF SYSTEMS
[Chills] : no chills [Fever] : no fever [Feeling Poorly] : not feeling poorly [Feeling Tired] : feeling tired [Eye Pain] : no eye pain [Dry Eyes] : no dryness of the eyes [Loss Of Hearing] : no hearing loss [Chest Pain] : no chest pain [Lower Ext Edema] : no lower extremity edema [Cough] : no cough [SOB on Exertion] : no shortness of breath during exertion [Shortness Of Breath] : no shortness of breath [Abdominal Pain] : no abdominal pain [Dysuria] : no dysuria [Arthralgias] : arthralgias [Joint Swelling] : no joint swelling [Joint Pain] : joint pain [Joint Stiffness] : no joint stiffness [Dizziness] : no dizziness [Skin Lesions] : no skin lesions [Difficulty Walking] : difficulty walking [Limb Weakness] : no limb weakness [Fainting] : no fainting [Depression] : no depression [Anxiety] : no anxiety [Muscle Weakness] : no muscle weakness [Easy Bruising] : no tendency for easy bruising

## 2019-07-09 NOTE — PROCEDURE
[Today's Date:] : Date: [unfilled] [Risks] : risks [Patient] : the patient [Benefits] : benefits [Consent Obtained] : written consent was obtained prior to the procedure and is detailed in the patient's record [#1 Site: ______] : #1 site identified in the [unfilled] [Therapeutic] : therapeutic [Betadine] : betadine solution [Alcohol] : alcohol [Depomedrol ___ mg] : Depomedrol [unfilled] mg [25 gauge 1.5 inch] : A 25 gauge 1.5 inch needle was used [Tolerated Well] : the patient tolerated the procedure well [No Complications] : there were no complications [Instructions Given] : handouts/patient instructions were given to patient [Patient Instructed to Call] : patient was instructed to call if redness at site, a decrease in range of motion or an increase in pain is noted after procedure.

## 2019-07-09 NOTE — ASSESSMENT
[FreeTextEntry1] : 73-year-old  female with multiple medical problems including hypertension, osteoarthritis status post bilateral knee replacement, left hemiparesis, on anticoagulation, recent pelvic fracture, and rheumatoid arthritis.\par \par Rheumatoid arthritis-\par based on her history she was diagnosed about 3 years ago based on joint pains and abnormal blood work.  She was steroid dependent and was taken off the steroids once she had the pelvic fracture. \par  Prior to seeing me she has tried Plaquenil. With me she initially tried cimzia followed by xeljanz in combination with methotrexate. We then resumed the cimzia. A few months ago she was hospitalized with pneumonia, at that time it was unclear whether she had pneumonia versus COPD versus interstitial lung disease or side effect of methotrexate. She continues to stay off both the TNF inhibitors and the methotrexate.\par \par She is currently on sulfasalazine 2 g daily. She defers use of any additional medications. The issue is that she continues to use steroids albeit small dose. She is currently on 2.5 mg q OD, she defers increasing prdnisone. 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 As per pulmonologist, it is unclear if she had to toxicity from methotrexate but we discontinued it.\par \par Her last CT chest as per pulmonary is much better. For now we will continue with prednisone as treatment for the rheumatoid arthritis as well as the pulmonary issue. \par \par Steroid use-\par Risks and benefits of steroids were d/w patient including but not limited to weight gain, diabetes, HTN, avascular necrosis, osteoporosis, glaucoma, cataract, infections and immunosuppression\par \par Polyarthritis-\par IM depomedrol 80 mg x 1 today\par \par f/u 4 weeks

## 2019-07-09 NOTE — HISTORY OF PRESENT ILLNESS
[FreeTextEntry1] : Alda returns for a followup for her rheumatoid arthritis. She is currently using sulfasalazine 2 g daily. She is using prednisone 2.5 mg  every other daily. She was supposed to wean off the prednisone but was not aware that she had 2.\par \par Since her last visit with me she passed out at home, she now has a pacemaker put in. She continues to use the anticoagulation. She states that intermittently she will use Aleve. She rates her current joint pain as a 7/10. She admits to fatigue. Which he rates at 5/10. She has not been exercising, she has stopped working. She is concerned about a new nodule on her left thumb. She wants to know what the next step is for her rheumatoid arthritis.\par \par Last year her bone density test was normal.\par \par She has seen pulmonary, she was not told that she had to repeat a CT chest. She also has an appointment to repeat the MRI of the pancreas.\par \par She admits to pain all over. She has a good appetite and denies weight loss. She denies fevers or chills or night sweats.

## 2019-07-09 NOTE — PHYSICAL EXAM
[General Appearance - Well Nourished] : well nourished [General Appearance - Alert] : alert [General Appearance - Well Developed] : well developed [Sclera] : the sclera and conjunctiva were normal [Oropharynx] : the oropharynx was normal [Neck Appearance] : the appearance of the neck was normal [Respiration, Rhythm And Depth] : normal respiratory rhythm and effort [Heart Sounds] : normal S1 and S2 [Full Pulse] : the pedal pulses are present [Edema] : there was no peripheral edema [Abdomen Tenderness] : non-tender [Cervical Lymph Nodes Enlarged Anterior Bilaterally] : anterior cervical [No CVA Tenderness] : no ~M costovertebral angle tenderness [Supraclavicular Lymph Nodes Enlarged Bilaterally] : supraclavicular [Abnormal Walk] : normal gait [No Spinal Tenderness] : no spinal tenderness [Nail Clubbing] : no clubbing  or cyanosis of the fingernails [Motor Tone] : muscle strength and tone were normal [FreeTextEntry1] : antalgic gait, overall improved/ , bony changes hands, knees b/l TKR, hips and ankles and shoulders FROM, no active synovitis, POM ankles, left thumb with ? new nodule, bony changes hands c/w OA [] : no rash [Deep Tendon Reflexes (DTR)] : deep tendon reflexes were 2+ and symmetric [Motor Exam] : the motor exam was normal [Impaired Insight] : insight and judgment were intact [Oriented To Time, Place, And Person] : oriented to person, place, and time [Affect] : the affect was normal

## 2019-07-11 RX ORDER — ABATACEPT 250 MG/15ML
250 INJECTION, POWDER, LYOPHILIZED, FOR SOLUTION INTRAVENOUS
Qty: 0 | Refills: 0 | Status: COMPLETED | OUTPATIENT
Start: 2019-07-11 | End: 1900-01-01

## 2019-07-11 RX ADMIN — ABATACEPT 3 MG: 250 INJECTION, POWDER, LYOPHILIZED, FOR SOLUTION INTRAVENOUS at 00:00

## 2019-07-12 RX ADMIN — ABATACEPT 3 MG: 250 INJECTION, POWDER, LYOPHILIZED, FOR SOLUTION INTRAVENOUS at 00:00

## 2019-07-15 RX ORDER — ABATACEPT 250 MG/15ML
250 INJECTION, POWDER, LYOPHILIZED, FOR SOLUTION INTRAVENOUS
Qty: 0 | Refills: 0 | Status: COMPLETED | OUTPATIENT
Start: 2019-07-10 | End: 1900-01-01

## 2019-07-16 ENCOUNTER — OUTPATIENT (OUTPATIENT)
Dept: OUTPATIENT SERVICES | Facility: HOSPITAL | Age: 74
LOS: 1 days | Discharge: HOME | End: 2019-07-16
Payer: MEDICARE

## 2019-07-16 DIAGNOSIS — Z96.651 PRESENCE OF RIGHT ARTIFICIAL KNEE JOINT: Chronic | ICD-10-CM

## 2019-07-16 DIAGNOSIS — K86.2 CYST OF PANCREAS: ICD-10-CM

## 2019-07-16 DIAGNOSIS — Z98.890 OTHER SPECIFIED POSTPROCEDURAL STATES: Chronic | ICD-10-CM

## 2019-07-16 DIAGNOSIS — T84.093A OTHER MECHANICAL COMPLICATION OF INTERNAL LEFT KNEE PROSTHESIS, INITIAL ENCOUNTER: Chronic | ICD-10-CM

## 2019-07-16 PROCEDURE — 71045 X-RAY EXAM CHEST 1 VIEW: CPT | Mod: 26

## 2019-07-16 PROCEDURE — 93286 PERI-PX EVAL PM/LDLS PM IP: CPT | Mod: 26

## 2019-07-16 PROCEDURE — 74183 MRI ABD W/O CNTR FLWD CNTR: CPT | Mod: 26

## 2019-07-18 ENCOUNTER — APPOINTMENT (OUTPATIENT)
Dept: RHEUMATOLOGY | Facility: CLINIC | Age: 74
End: 2019-07-18
Payer: MEDICARE

## 2019-07-18 VITALS
RESPIRATION RATE: 14 BRPM | SYSTOLIC BLOOD PRESSURE: 124 MMHG | DIASTOLIC BLOOD PRESSURE: 77 MMHG | OXYGEN SATURATION: 97 % | HEART RATE: 74 BPM

## 2019-07-18 VITALS
HEART RATE: 81 BPM | OXYGEN SATURATION: 97 % | DIASTOLIC BLOOD PRESSURE: 72 MMHG | TEMPERATURE: 97.9 F | SYSTOLIC BLOOD PRESSURE: 106 MMHG | RESPIRATION RATE: 15 BRPM

## 2019-07-18 VITALS
OXYGEN SATURATION: 96 % | HEART RATE: 75 BPM | SYSTOLIC BLOOD PRESSURE: 101 MMHG | DIASTOLIC BLOOD PRESSURE: 67 MMHG | RESPIRATION RATE: 15 BRPM

## 2019-07-18 PROCEDURE — 96413 CHEMO IV INFUSION 1 HR: CPT

## 2019-07-18 RX ORDER — ABATACEPT 250 MG/15ML
250 INJECTION, POWDER, LYOPHILIZED, FOR SOLUTION INTRAVENOUS
Qty: 0 | Refills: 0 | Status: COMPLETED | OUTPATIENT
Start: 2019-07-12

## 2019-07-26 RX ADMIN — ABATACEPT 3 MG: 250 INJECTION, POWDER, LYOPHILIZED, FOR SOLUTION INTRAVENOUS at 00:00

## 2019-08-01 ENCOUNTER — APPOINTMENT (OUTPATIENT)
Dept: RHEUMATOLOGY | Facility: CLINIC | Age: 74
End: 2019-08-01
Payer: MEDICARE

## 2019-08-01 VITALS
RESPIRATION RATE: 14 BRPM | SYSTOLIC BLOOD PRESSURE: 130 MMHG | DIASTOLIC BLOOD PRESSURE: 76 MMHG | OXYGEN SATURATION: 98 % | HEART RATE: 72 BPM | TEMPERATURE: 97.9 F

## 2019-08-01 VITALS
RESPIRATION RATE: 14 BRPM | SYSTOLIC BLOOD PRESSURE: 132 MMHG | HEART RATE: 74 BPM | OXYGEN SATURATION: 99 % | DIASTOLIC BLOOD PRESSURE: 70 MMHG

## 2019-08-01 PROCEDURE — 96413 CHEMO IV INFUSION 1 HR: CPT

## 2019-08-01 RX ORDER — ABATACEPT 250 MG/15ML
250 INJECTION, POWDER, LYOPHILIZED, FOR SOLUTION INTRAVENOUS
Qty: 0 | Refills: 0 | Status: COMPLETED | OUTPATIENT
Start: 2019-07-26

## 2019-08-09 RX ADMIN — ABATACEPT 3 MG: 250 INJECTION, POWDER, LYOPHILIZED, FOR SOLUTION INTRAVENOUS at 00:00

## 2019-08-15 ENCOUNTER — APPOINTMENT (OUTPATIENT)
Dept: RHEUMATOLOGY | Facility: CLINIC | Age: 74
End: 2019-08-15
Payer: MEDICARE

## 2019-08-15 VITALS
OXYGEN SATURATION: 97 % | DIASTOLIC BLOOD PRESSURE: 81 MMHG | SYSTOLIC BLOOD PRESSURE: 126 MMHG | HEART RATE: 77 BPM | TEMPERATURE: 98.4 F | RESPIRATION RATE: 15 BRPM

## 2019-08-15 VITALS
HEART RATE: 72 BPM | SYSTOLIC BLOOD PRESSURE: 113 MMHG | OXYGEN SATURATION: 97 % | RESPIRATION RATE: 15 BRPM | DIASTOLIC BLOOD PRESSURE: 76 MMHG

## 2019-08-15 VITALS
SYSTOLIC BLOOD PRESSURE: 129 MMHG | OXYGEN SATURATION: 98 % | TEMPERATURE: 97.9 F | HEART RATE: 72 BPM | DIASTOLIC BLOOD PRESSURE: 81 MMHG | RESPIRATION RATE: 15 BRPM

## 2019-08-15 PROCEDURE — 96413 CHEMO IV INFUSION 1 HR: CPT

## 2019-08-15 RX ORDER — ABATACEPT 250 MG/15ML
250 INJECTION, POWDER, LYOPHILIZED, FOR SOLUTION INTRAVENOUS
Qty: 0 | Refills: 0 | Status: COMPLETED | OUTPATIENT
Start: 2019-08-09

## 2019-08-15 RX ADMIN — ABATACEPT 0 MG: 250 INJECTION, POWDER, LYOPHILIZED, FOR SOLUTION INTRAVENOUS at 00:00

## 2019-08-15 NOTE — ED PROVIDER NOTE - NS ED NOTE AC HIGH RISK COUNTRIES
Gen: Pt A/Ox3, NAD  Card: RRR, no mrg  Lung: CTABL, no wheezes or crackles  Abdomen: Soft, nontender, nondistended  Ext: no edema No

## 2019-08-30 ENCOUNTER — APPOINTMENT (OUTPATIENT)
Dept: RHEUMATOLOGY | Facility: CLINIC | Age: 74
End: 2019-08-30
Payer: MEDICARE

## 2019-08-30 VITALS
SYSTOLIC BLOOD PRESSURE: 112 MMHG | HEART RATE: 83 BPM | WEIGHT: 145 LBS | DIASTOLIC BLOOD PRESSURE: 78 MMHG | BODY MASS INDEX: 21.98 KG/M2 | OXYGEN SATURATION: 98 % | HEIGHT: 68 IN

## 2019-08-30 PROCEDURE — 99214 OFFICE O/P EST MOD 30 MIN: CPT

## 2019-08-30 RX ORDER — PREDNISONE 2.5 MG/1
2.5 TABLET ORAL
Qty: 60 | Refills: 2 | Status: DISCONTINUED | COMMUNITY
Start: 2019-01-18 | End: 2019-08-30

## 2019-08-31 NOTE — PHYSICAL EXAM
[General Appearance - Alert] : alert [General Appearance - Well Developed] : well developed [General Appearance - Well Nourished] : well nourished [Sclera] : the sclera and conjunctiva were normal [Oropharynx] : the oropharynx was normal [Neck Appearance] : the appearance of the neck was normal [Respiration, Rhythm And Depth] : normal respiratory rhythm and effort [Heart Sounds] : normal S1 and S2 [Full Pulse] : the pedal pulses are present [Abdomen Tenderness] : non-tender [Edema] : there was no peripheral edema [Cervical Lymph Nodes Enlarged Anterior Bilaterally] : anterior cervical [Supraclavicular Lymph Nodes Enlarged Bilaterally] : supraclavicular [No CVA Tenderness] : no ~M costovertebral angle tenderness [No Spinal Tenderness] : no spinal tenderness [Abnormal Walk] : normal gait [Nail Clubbing] : no clubbing  or cyanosis of the fingernails [FreeTextEntry1] : antalgic gait, overall improved/ , bony changes hands, knees b/l TKR, hips and ankles and shoulders FROM, no active synovitis,  bony changes hands c/w OA [Motor Tone] : muscle strength and tone were normal [Deep Tendon Reflexes (DTR)] : deep tendon reflexes were 2+ and symmetric [] : no rash [Motor Exam] : the motor exam was normal [Oriented To Time, Place, And Person] : oriented to person, place, and time [Impaired Insight] : insight and judgment were intact [Affect] : the affect was normal

## 2019-08-31 NOTE — ASSESSMENT
[FreeTextEntry1] : 73-year-old  female with multiple medical problems including hypertension, osteoarthritis status post bilateral knee replacement, left hemiparesis, on anticoagulation, recent pelvic fracture, and rheumatoid arthritis.\par \par Rheumatoid arthritis-\par based on her history she was diagnosed about 3 years ago based on joint pains and abnormal blood work.  She was steroid dependent and was taken off the steroids once she had the pelvic fracture. \par  Prior to seeing me she has tried Plaquenil. With me she initially tried cimzia followed by xeljanz in combination with methotrexate. We then resumed the cimzia. A few months ago she was hospitalized with pneumonia, at that time it was unclear whether she had pneumonia versus COPD versus interstitial lung disease or side effect of methotrexate. She continues to stay off both the TNF inhibitors and the methotrexate.\par \par She is currently on sulfasalazine 2 g daily. She is on on IV orencia and significantly better. She is able to wean off steroids. \par \par Nonspecific interstitial pneumonitis-\par She was admitted to [facility] in December 2018 with pneumonia, she also has superimposed NSIP most likely related to the rheumatoid arthritis. As per pulmonologist, it is unclear if she had to toxicity from methotrexate but we discontinued it.\par \par Her last CT chest as per pulmonary is much better. For now we will continue with prednisone as treatment for the rheumatoid arthritis as well as the pulmonary issue. \par \par Immunosupresant use-\par she is aware to call if unwell\par recommend immunizations\par \par f/u 8 weeks

## 2019-08-31 NOTE — REVIEW OF SYSTEMS
[Fever] : no fever [Chills] : no chills [Feeling Poorly] : not feeling poorly [Feeling Tired] : feeling tired [Eye Pain] : no eye pain [Loss Of Hearing] : no hearing loss [Dry Eyes] : no dryness of the eyes [Lower Ext Edema] : no lower extremity edema [Chest Pain] : no chest pain [Shortness Of Breath] : no shortness of breath [Cough] : no cough [Abdominal Pain] : no abdominal pain [SOB on Exertion] : no shortness of breath during exertion [Dysuria] : no dysuria [Arthralgias] : arthralgias [Joint Swelling] : no joint swelling [Joint Pain] : joint pain [Joint Stiffness] : no joint stiffness [Skin Lesions] : no skin lesions [Fainting] : no fainting [Dizziness] : no dizziness [Difficulty Walking] : difficulty walking [Limb Weakness] : no limb weakness [Anxiety] : no anxiety [Depression] : no depression [Muscle Weakness] : no muscle weakness [Easy Bruising] : no tendency for easy bruising

## 2019-08-31 NOTE — HISTORY OF PRESENT ILLNESS
[FreeTextEntry1] : Alda returns for a followup for her rheumatoid arthritis. She is currently using sulfasalazine 2 g daily. She is now off steroids. She is on orencia and has comeplted loading doses. She is feeling much better. \par \par She rates her current joint pain as a 2/10. She admits to fatigue at same level. She has stopped working. She states that her nodules are shrinking. 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 She has been able to find rheumatology as well and they do infusions for her. \par She has a good appetite and denies weight loss. She denies fevers or chills or night sweats.

## 2019-09-06 RX ADMIN — ABATACEPT 0 MG: 250 INJECTION, POWDER, LYOPHILIZED, FOR SOLUTION INTRAVENOUS at 00:00

## 2019-09-12 ENCOUNTER — APPOINTMENT (OUTPATIENT)
Dept: RHEUMATOLOGY | Facility: CLINIC | Age: 74
End: 2019-09-12
Payer: MEDICARE

## 2019-09-12 VITALS
RESPIRATION RATE: 14 BRPM | TEMPERATURE: 98 F | SYSTOLIC BLOOD PRESSURE: 106 MMHG | OXYGEN SATURATION: 98 % | DIASTOLIC BLOOD PRESSURE: 68 MMHG | HEART RATE: 83 BPM

## 2019-09-12 VITALS
OXYGEN SATURATION: 97 % | RESPIRATION RATE: 15 BRPM | DIASTOLIC BLOOD PRESSURE: 70 MMHG | HEART RATE: 74 BPM | SYSTOLIC BLOOD PRESSURE: 109 MMHG

## 2019-09-12 PROCEDURE — 96413 CHEMO IV INFUSION 1 HR: CPT

## 2019-09-12 RX ORDER — ABATACEPT 250 MG/15ML
250 INJECTION, POWDER, LYOPHILIZED, FOR SOLUTION INTRAVENOUS
Qty: 0 | Refills: 0 | Status: COMPLETED | OUTPATIENT
Start: 2019-09-06

## 2019-09-13 LAB
ALBUMIN SERPL ELPH-MCNC: 3.3 G/DL
ALP BLD-CCNC: 102 U/L
ALT SERPL-CCNC: 6 U/L
ANION GAP SERPL CALC-SCNC: 10 MMOL/L
AST SERPL-CCNC: 14 U/L
BASOPHILS # BLD AUTO: 0.04 K/UL
BASOPHILS NFR BLD AUTO: 0.7 %
BILIRUB SERPL-MCNC: 0.3 MG/DL
BUN SERPL-MCNC: 12 MG/DL
CALCIUM SERPL-MCNC: 7.5 MG/DL
CHLORIDE SERPL-SCNC: 114 MMOL/L
CO2 SERPL-SCNC: 20 MMOL/L
CREAT SERPL-MCNC: 0.69 MG/DL
CRP SERPL-MCNC: 0.25 MG/DL
EOSINOPHIL # BLD AUTO: 0.12 K/UL
EOSINOPHIL NFR BLD AUTO: 2.1 %
ERYTHROCYTE [SEDIMENTATION RATE] IN BLOOD BY WESTERGREN METHOD: 3 MM/HR
GLUCOSE SERPL-MCNC: 92 MG/DL
HCT VFR BLD CALC: 37.2 %
HGB BLD-MCNC: 11.4 G/DL
IMM GRANULOCYTES NFR BLD AUTO: 0.4 %
LYMPHOCYTES # BLD AUTO: 1.54 K/UL
LYMPHOCYTES NFR BLD AUTO: 27.2 %
MAN DIFF?: NORMAL
MCHC RBC-ENTMCNC: 27.9 PG
MCHC RBC-ENTMCNC: 30.6 GM/DL
MCV RBC AUTO: 91.2 FL
MONOCYTES # BLD AUTO: 0.63 K/UL
MONOCYTES NFR BLD AUTO: 11.1 %
NEUTROPHILS # BLD AUTO: 3.31 K/UL
NEUTROPHILS NFR BLD AUTO: 58.5 %
PLATELET # BLD AUTO: 209 K/UL
POTASSIUM SERPL-SCNC: 3.3 MMOL/L
PROT SERPL-MCNC: 5.1 G/DL
RBC # BLD: 4.08 M/UL
RBC # FLD: 13.6 %
SODIUM SERPL-SCNC: 144 MMOL/L
WBC # FLD AUTO: 5.66 K/UL

## 2019-10-04 RX ADMIN — ABATACEPT 0 MG: 250 INJECTION, POWDER, LYOPHILIZED, FOR SOLUTION INTRAVENOUS at 00:00

## 2019-10-09 NOTE — DISCHARGE NOTE ADULT - CARE PROVIDERS DIRECT ADDRESSES
[] : Fellow ,DirectAddress_Unknown,DirectAddress_Unknown,DirectAddress_Unknown,[email or web address]

## 2019-10-10 ENCOUNTER — APPOINTMENT (OUTPATIENT)
Dept: RHEUMATOLOGY | Facility: CLINIC | Age: 74
End: 2019-10-10
Payer: MEDICARE

## 2019-10-10 VITALS
DIASTOLIC BLOOD PRESSURE: 65 MMHG | OXYGEN SATURATION: 98 % | SYSTOLIC BLOOD PRESSURE: 120 MMHG | TEMPERATURE: 97.5 F | HEART RATE: 68 BPM | RESPIRATION RATE: 15 BRPM

## 2019-10-10 VITALS
SYSTOLIC BLOOD PRESSURE: 124 MMHG | RESPIRATION RATE: 15 BRPM | OXYGEN SATURATION: 95 % | DIASTOLIC BLOOD PRESSURE: 76 MMHG | HEART RATE: 89 BPM

## 2019-10-10 PROCEDURE — 96413 CHEMO IV INFUSION 1 HR: CPT

## 2019-10-10 RX ORDER — ABATACEPT 250 MG/15ML
250 INJECTION, POWDER, LYOPHILIZED, FOR SOLUTION INTRAVENOUS
Qty: 0 | Refills: 0 | Status: COMPLETED | OUTPATIENT
Start: 2019-10-04

## 2019-10-28 ENCOUNTER — APPOINTMENT (OUTPATIENT)
Dept: NEUROLOGY | Facility: CLINIC | Age: 74
End: 2019-10-28
Payer: MEDICARE

## 2019-10-28 VITALS
SYSTOLIC BLOOD PRESSURE: 109 MMHG | WEIGHT: 148 LBS | BODY MASS INDEX: 22.43 KG/M2 | HEIGHT: 68 IN | HEART RATE: 76 BPM | DIASTOLIC BLOOD PRESSURE: 76 MMHG

## 2019-10-28 DIAGNOSIS — I48.91 UNSPECIFIED ATRIAL FIBRILLATION: ICD-10-CM

## 2019-10-28 DIAGNOSIS — M06.4 INFLAMMATORY POLYARTHROPATHY: ICD-10-CM

## 2019-10-28 DIAGNOSIS — G81.94 HEMIPLEGIA, UNSPECIFIED AFFECTING LEFT NONDOMINANT SIDE: ICD-10-CM

## 2019-10-28 DIAGNOSIS — R42 DIZZINESS AND GIDDINESS: ICD-10-CM

## 2019-10-28 DIAGNOSIS — I63.9 CEREBRAL INFARCTION, UNSPECIFIED: ICD-10-CM

## 2019-10-28 PROCEDURE — 99214 OFFICE O/P EST MOD 30 MIN: CPT

## 2019-10-28 NOTE — DISCUSSION/SUMMARY
[FreeTextEntry1] : Patient with chronic a fibrillation with a right CVA residual left presses complete resolution of symptoms.\par \par Continue present medications.\par \par Followup with rheumatology and cardiology.\par \par Return for reevaluation in 6 months or as needed.\par \par Patient education provided regarding secondary stroke prevention.\par \par Followup evaluation in 6 months or as needed.\par \par Followup with you for other medical problems.

## 2019-10-28 NOTE — HISTORY OF PRESENT ILLNESS
[FreeTextEntry1] : She is 74-year-old patient coming here for followup evaluation for right CVA with left with chronic  atrial fibrillation and permanent pacemaker currently on Eliquis and statin also taking orencia for rheumatoid arthritis no new complaints.\par \par No headaches no dizziness no focal weakness stable neurologically. Under care of rheumatologist and cardiologist. In the plan of moving.

## 2019-10-28 NOTE — REVIEW OF SYSTEMS
[Arm Weakness] : no arm weakness [Hand Weakness] : no hand weakness [Leg Weakness] : no leg weakness [Abnormal Sensation] : an abnormal sensation [Dizziness] : dizziness [Difficulty Walking] : no difficulty walking [Arthralgias] : arthralgias [Joint Pain] : joint pain [Negative] : Heme/Lymph [de-identified] : Improved focal weakness

## 2019-11-01 RX ADMIN — ABATACEPT 0 MG: 250 INJECTION, POWDER, LYOPHILIZED, FOR SOLUTION INTRAVENOUS at 00:00

## 2019-11-15 ENCOUNTER — APPOINTMENT (OUTPATIENT)
Dept: RHEUMATOLOGY | Facility: CLINIC | Age: 74
End: 2019-11-15

## 2019-11-25 ENCOUNTER — APPOINTMENT (OUTPATIENT)
Dept: RHEUMATOLOGY | Facility: CLINIC | Age: 74
End: 2019-11-25
Payer: MEDICARE

## 2019-11-25 VITALS
HEART RATE: 96 BPM | TEMPERATURE: 97.9 F | DIASTOLIC BLOOD PRESSURE: 70 MMHG | BODY MASS INDEX: 22.5 KG/M2 | OXYGEN SATURATION: 97 % | SYSTOLIC BLOOD PRESSURE: 110 MMHG | WEIGHT: 148 LBS

## 2019-11-25 PROCEDURE — 99214 OFFICE O/P EST MOD 30 MIN: CPT

## 2019-11-25 NOTE — HISTORY OF PRESENT ILLNESS
[FreeTextEntry1] : Alda returns for a followup for her rheumatoid arthritis. She is currently using sulfasalazine 2 g daily. She is now off steroids. She is on orencia and has comeplted loading doses. She is feeling much better. \par She had to miss last orencia dose as she was sick, \par She rates her current joint pain as a 2/10. She admits to fatigue at same level. She has stopped working. She states that her nodules are shrinking. 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 \par She has a good appetite and denies weight loss. She denies fevers or chills or night sweats.

## 2019-11-25 NOTE — ASSESSMENT
[FreeTextEntry1] : 74-year-old  female with multiple medical problems including hypertension, osteoarthritis status post bilateral knee replacement, left hemiparesis, on anticoagulation, recent pelvic fracture, and rheumatoid arthritis.\par \par Rheumatoid arthritis-\par based on her history she was diagnosed about 3 years ago based on joint pains and abnormal blood work.  She was steroid dependent and was taken off the steroids once she had the pelvic fracture. \par  Prior to seeing me she has tried Plaquenil. With me she initially tried cimzia followed by xeljanz in combination with methotrexate. We then resumed the cimzia. A few months ago she was hospitalized with pneumonia, at that time it was unclear whether she had pneumonia versus COPD versus interstitial lung disease or side effect of methotrexate. She continues to stay off both the TNF inhibitors and the methotrexate.\par \par She is currently on sulfasalazine 2 g daily. She is on on IV orencia and significantly better. She is able to wean off steroids. \par \par Nonspecific interstitial pneumonitis-\par She was admitted to [facility] in December 2018 with pneumonia, she also has superimposed NSIP most likely related to the rheumatoid arthritis. As per pulmonologist, it is unclear if she had to toxicity from methotrexate but we discontinued it.\par \par Her last CT chest as per pulmonary is much better\par \par Immunosupresant use-\par she is aware to call if unwell\par recommend immunizations\par \par f/u 12 weeks

## 2019-11-25 NOTE — PHYSICAL EXAM
[General Appearance - Alert] : alert [General Appearance - Well Nourished] : well nourished [General Appearance - Well Developed] : well developed [Sclera] : the sclera and conjunctiva were normal [Oropharynx] : the oropharynx was normal [Neck Appearance] : the appearance of the neck was normal [Respiration, Rhythm And Depth] : normal respiratory rhythm and effort [Heart Sounds] : normal S1 and S2 [Full Pulse] : the pedal pulses are present [Edema] : there was no peripheral edema [Abdomen Tenderness] : non-tender [Cervical Lymph Nodes Enlarged Anterior Bilaterally] : anterior cervical [Supraclavicular Lymph Nodes Enlarged Bilaterally] : supraclavicular [No CVA Tenderness] : no ~M costovertebral angle tenderness [No Spinal Tenderness] : no spinal tenderness [Abnormal Walk] : normal gait [Nail Clubbing] : no clubbing  or cyanosis of the fingernails [Motor Tone] : muscle strength and tone were normal [] : no rash [FreeTextEntry1] : antalgic gait, overall improved/ , bony changes hands, knees b/l TKR, hips and ankles and shoulders FROM, no active synovitis,  bony changes hands c/w OA [Deep Tendon Reflexes (DTR)] : deep tendon reflexes were 2+ and symmetric [Motor Exam] : the motor exam was normal [Oriented To Time, Place, And Person] : oriented to person, place, and time [Impaired Insight] : insight and judgment were intact [Affect] : the affect was normal

## 2019-11-26 ENCOUNTER — APPOINTMENT (OUTPATIENT)
Dept: RHEUMATOLOGY | Facility: CLINIC | Age: 74
End: 2019-11-26
Payer: MEDICARE

## 2019-11-26 VITALS
OXYGEN SATURATION: 100 % | TEMPERATURE: 97.6 F | RESPIRATION RATE: 15 BRPM | HEART RATE: 77 BPM | DIASTOLIC BLOOD PRESSURE: 77 MMHG | SYSTOLIC BLOOD PRESSURE: 130 MMHG

## 2019-11-26 VITALS — SYSTOLIC BLOOD PRESSURE: 130 MMHG | HEART RATE: 73 BPM | OXYGEN SATURATION: 98 % | DIASTOLIC BLOOD PRESSURE: 73 MMHG

## 2019-11-26 PROCEDURE — 36415 COLL VENOUS BLD VENIPUNCTURE: CPT

## 2019-11-26 PROCEDURE — 96413 CHEMO IV INFUSION 1 HR: CPT

## 2019-11-26 RX ORDER — ABATACEPT 250 MG/15ML
250 INJECTION, POWDER, LYOPHILIZED, FOR SOLUTION INTRAVENOUS
Qty: 0 | Refills: 0 | Status: COMPLETED | OUTPATIENT
Start: 2019-11-01

## 2019-11-29 ENCOUNTER — EMERGENCY (EMERGENCY)
Facility: HOSPITAL | Age: 74
LOS: 0 days | Discharge: HOME | End: 2019-11-29
Attending: EMERGENCY MEDICINE
Payer: MEDICARE

## 2019-11-29 VITALS
TEMPERATURE: 98 F | RESPIRATION RATE: 18 BRPM | OXYGEN SATURATION: 99 % | HEART RATE: 84 BPM | DIASTOLIC BLOOD PRESSURE: 84 MMHG | SYSTOLIC BLOOD PRESSURE: 129 MMHG

## 2019-11-29 VITALS — WEIGHT: 153 LBS | HEIGHT: 68 IN

## 2019-11-29 DIAGNOSIS — Z95.0 PRESENCE OF CARDIAC PACEMAKER: ICD-10-CM

## 2019-11-29 DIAGNOSIS — T84.093A OTHER MECHANICAL COMPLICATION OF INTERNAL LEFT KNEE PROSTHESIS, INITIAL ENCOUNTER: Chronic | ICD-10-CM

## 2019-11-29 DIAGNOSIS — Z98.890 OTHER SPECIFIED POSTPROCEDURAL STATES: Chronic | ICD-10-CM

## 2019-11-29 DIAGNOSIS — M06.9 RHEUMATOID ARTHRITIS, UNSPECIFIED: ICD-10-CM

## 2019-11-29 DIAGNOSIS — M54.5 LOW BACK PAIN: ICD-10-CM

## 2019-11-29 DIAGNOSIS — Z96.651 PRESENCE OF RIGHT ARTIFICIAL KNEE JOINT: Chronic | ICD-10-CM

## 2019-11-29 DIAGNOSIS — I48.91 UNSPECIFIED ATRIAL FIBRILLATION: ICD-10-CM

## 2019-11-29 DIAGNOSIS — Z86.73 PERSONAL HISTORY OF TRANSIENT ISCHEMIC ATTACK (TIA), AND CEREBRAL INFARCTION WITHOUT RESIDUAL DEFICITS: ICD-10-CM

## 2019-11-29 DIAGNOSIS — I10 ESSENTIAL (PRIMARY) HYPERTENSION: ICD-10-CM

## 2019-11-29 LAB
ALBUMIN SERPL ELPH-MCNC: 3.7 G/DL — SIGNIFICANT CHANGE UP (ref 3.3–5)
ALP SERPL-CCNC: 138 U/L — HIGH (ref 40–120)
ALT FLD-CCNC: 16 U/L — SIGNIFICANT CHANGE UP (ref 12–78)
ANION GAP SERPL CALC-SCNC: 9 MMOL/L — SIGNIFICANT CHANGE UP (ref 5–17)
AST SERPL-CCNC: 26 U/L — SIGNIFICANT CHANGE UP (ref 15–37)
BASOPHILS # BLD AUTO: 0.03 K/UL — SIGNIFICANT CHANGE UP (ref 0–0.2)
BASOPHILS NFR BLD AUTO: 0.4 % — SIGNIFICANT CHANGE UP (ref 0–2)
BILIRUB SERPL-MCNC: 0.6 MG/DL — SIGNIFICANT CHANGE UP (ref 0.2–1.2)
BUN SERPL-MCNC: 14 MG/DL — SIGNIFICANT CHANGE UP (ref 7–23)
CALCIUM SERPL-MCNC: 9.1 MG/DL — SIGNIFICANT CHANGE UP (ref 8.5–10.1)
CHLORIDE SERPL-SCNC: 110 MMOL/L — HIGH (ref 96–108)
CO2 SERPL-SCNC: 24 MMOL/L — SIGNIFICANT CHANGE UP (ref 22–31)
CREAT SERPL-MCNC: 0.87 MG/DL — SIGNIFICANT CHANGE UP (ref 0.5–1.3)
EOSINOPHIL # BLD AUTO: 0.12 K/UL — SIGNIFICANT CHANGE UP (ref 0–0.5)
EOSINOPHIL NFR BLD AUTO: 1.5 % — SIGNIFICANT CHANGE UP (ref 0–6)
GLUCOSE SERPL-MCNC: 90 MG/DL — SIGNIFICANT CHANGE UP (ref 70–99)
HCT VFR BLD CALC: 34.6 % — SIGNIFICANT CHANGE UP (ref 34.5–45)
HGB BLD-MCNC: 11.2 G/DL — LOW (ref 11.5–15.5)
IMM GRANULOCYTES NFR BLD AUTO: 0.4 % — SIGNIFICANT CHANGE UP (ref 0–1.5)
LYMPHOCYTES # BLD AUTO: 1.65 K/UL — SIGNIFICANT CHANGE UP (ref 1–3.3)
LYMPHOCYTES # BLD AUTO: 20.1 % — SIGNIFICANT CHANGE UP (ref 13–44)
MCHC RBC-ENTMCNC: 28.1 PG — SIGNIFICANT CHANGE UP (ref 27–34)
MCHC RBC-ENTMCNC: 32.4 GM/DL — SIGNIFICANT CHANGE UP (ref 32–36)
MCV RBC AUTO: 86.9 FL — SIGNIFICANT CHANGE UP (ref 80–100)
MONOCYTES # BLD AUTO: 0.96 K/UL — HIGH (ref 0–0.9)
MONOCYTES NFR BLD AUTO: 11.7 % — SIGNIFICANT CHANGE UP (ref 2–14)
NEUTROPHILS # BLD AUTO: 5.4 K/UL — SIGNIFICANT CHANGE UP (ref 1.8–7.4)
NEUTROPHILS NFR BLD AUTO: 65.9 % — SIGNIFICANT CHANGE UP (ref 43–77)
PLATELET # BLD AUTO: 213 K/UL — SIGNIFICANT CHANGE UP (ref 150–400)
POTASSIUM SERPL-MCNC: 3.9 MMOL/L — SIGNIFICANT CHANGE UP (ref 3.5–5.3)
POTASSIUM SERPL-SCNC: 3.9 MMOL/L — SIGNIFICANT CHANGE UP (ref 3.5–5.3)
PROT SERPL-MCNC: 7.3 GM/DL — SIGNIFICANT CHANGE UP (ref 6–8.3)
RBC # BLD: 3.98 M/UL — SIGNIFICANT CHANGE UP (ref 3.8–5.2)
RBC # FLD: 14.4 % — SIGNIFICANT CHANGE UP (ref 10.3–14.5)
SODIUM SERPL-SCNC: 143 MMOL/L — SIGNIFICANT CHANGE UP (ref 135–145)
WBC # BLD: 8.19 K/UL — SIGNIFICANT CHANGE UP (ref 3.8–10.5)
WBC # FLD AUTO: 8.19 K/UL — SIGNIFICANT CHANGE UP (ref 3.8–10.5)

## 2019-11-29 PROCEDURE — 99284 EMERGENCY DEPT VISIT MOD MDM: CPT

## 2019-11-29 PROCEDURE — 36415 COLL VENOUS BLD VENIPUNCTURE: CPT

## 2019-11-29 PROCEDURE — 99284 EMERGENCY DEPT VISIT MOD MDM: CPT | Mod: 25

## 2019-11-29 PROCEDURE — 80053 COMPREHEN METABOLIC PANEL: CPT

## 2019-11-29 PROCEDURE — 85025 COMPLETE CBC W/AUTO DIFF WBC: CPT

## 2019-11-29 PROCEDURE — 72131 CT LUMBAR SPINE W/O DYE: CPT | Mod: 26

## 2019-11-29 PROCEDURE — 72131 CT LUMBAR SPINE W/O DYE: CPT

## 2019-11-29 RX ORDER — METHOCARBAMOL 500 MG/1
1 TABLET, FILM COATED ORAL
Qty: 20 | Refills: 0
Start: 2019-11-29 | End: 2019-12-03

## 2019-11-29 RX ORDER — ACETAMINOPHEN 500 MG
325 TABLET ORAL ONCE
Refills: 0 | Status: COMPLETED | OUTPATIENT
Start: 2019-11-29 | End: 2019-11-29

## 2019-11-29 RX ORDER — SODIUM CHLORIDE 9 MG/ML
1000 INJECTION INTRAMUSCULAR; INTRAVENOUS; SUBCUTANEOUS ONCE
Refills: 0 | Status: COMPLETED | OUTPATIENT
Start: 2019-11-29 | End: 2019-11-29

## 2019-11-29 RX ORDER — LIDOCAINE 4 G/100G
1 CREAM TOPICAL ONCE
Refills: 0 | Status: COMPLETED | OUTPATIENT
Start: 2019-11-29 | End: 2019-11-29

## 2019-11-29 RX ORDER — OXYCODONE AND ACETAMINOPHEN 5; 325 MG/1; MG/1
1 TABLET ORAL ONCE
Refills: 0 | Status: DISCONTINUED | OUTPATIENT
Start: 2019-11-29 | End: 2019-11-29

## 2019-11-29 RX ORDER — ACETAMINOPHEN 500 MG
2 TABLET ORAL
Qty: 30 | Refills: 0
Start: 2019-11-29 | End: 2019-12-03

## 2019-11-29 RX ADMIN — Medication 325 MILLIGRAM(S): at 13:37

## 2019-11-29 RX ADMIN — SODIUM CHLORIDE 1000 MILLILITER(S): 9 INJECTION INTRAMUSCULAR; INTRAVENOUS; SUBCUTANEOUS at 13:41

## 2019-11-29 RX ADMIN — LIDOCAINE 1 PATCH: 4 CREAM TOPICAL at 13:37

## 2019-11-29 RX ADMIN — OXYCODONE AND ACETAMINOPHEN 1 TABLET(S): 5; 325 TABLET ORAL at 13:36

## 2019-11-29 NOTE — ED STATDOCS - OBJECTIVE STATEMENT
75 y/o F with a PMHx of CVA, Afib s/p pacemaker, HTN, RA, herniated discs presents to the ED c/o low back pain beginning this morning. States she was cooking all day yesterday and woke up this morning with pain. Notes that she has been having trouble walking secondary to pain. Pt did not take any medications PTA. Denies fever, chills, trauma, or injury. Pt is anticoagulated on Eliquis.

## 2019-11-29 NOTE — ED ADULT NURSE NOTE - NSIMPLEMENTINTERV_GEN_ALL_ED
Implemented All Universal Safety Interventions:  [place] to call system. Call bell, personal items and telephone within reach. Instruct patient to call for assistance. Room bathroom lighting operational. Non-slip footwear when patient is off stretcher. Physically safe environment: no spills, clutter or unnecessary equipment. Stretcher in lowest position, wheels locked, appropriate side rails in place.

## 2019-11-29 NOTE — ED ADULT TRIAGE NOTE - CHIEF COMPLAINT QUOTE
c/o lower back pain started this morning, pt states she was cooking all day yesterday and woke up this morning with pain, patient states she has history of 3 herniated disc, did not take pain medication today

## 2019-11-29 NOTE — ED STATDOCS - ATTENDING CONTRIBUTION TO CARE
I, Eder Garcia, performed the initial face to face bedside interview with this patient regarding history of present illness, review of symptoms and relevant past medical, social and family history.  I completed an independent physical examination.  I was the initial provider who evaluated this patient. I have signed out the follow up of any pending tests (i.e. labs, radiological studies) to the ACP.  I have communicated the patient’s plan of care and disposition with the ACP.  The history, relevant review of systems, past medical and surgical history, medical decision making, and physical examination was documented by the scribe in my presence and I attest to the accuracy of the documentation.

## 2019-11-29 NOTE — ED STATDOCS - NSFOLLOWUPINSTRUCTIONS_ED_ALL_ED_FT
Please follow up with Primary MD in 2-3 days. Return to ED immediately for any new or concerning symptoms or worsening symptoms.     Back Pain    WHAT YOU NEED TO KNOW:    Back pain is common. It can be caused by many conditions, such as arthritis or the breakdown of spinal discs. Your risk for back pain is increased by injuries, lack of activity, or repeated bending and twisting. You may feel sore or stiff on one or both sides of your back. The pain may spread to your buttocks or thighs.    DISCHARGE INSTRUCTIONS:    Return to the emergency department if:     You have pain, numbness, or weakness in one or both legs.      Your pain becomes so severe that you cannot walk.      You cannot control your urine or bowel movements.      You have severe back pain with chest pain.      You have severe back pain, nausea, and vomiting.      You have severe back pain that spreads to your side or genital area.    Contact your healthcare provider if:     You have back pain that does not get better with rest and pain medicine.      You have a fever.      You have pain that worsens when you are on your back or when you rest.      You have pain that worsens when you cough or sneeze.      You lose weight without trying.      You have questions or concerns about your condition or care.    Medicines:     NSAIDs help decrease swelling and pain. This medicine is available with or without a doctor's order. NSAIDs can cause stomach bleeding or kidney problems in certain people. If you take blood thinner medicine, always ask your healthcare provider if NSAIDs are safe for you. Always read the medicine label and follow directions.      Acetaminophen decreases pain and fever. It is available without a doctor's order. Ask how much to take and how often to take it. Follow directions. Read the labels of all other medicines you are using to see if they also contain acetaminophen, or ask your doctor or pharmacist. Acetaminophen can cause liver damage if not taken correctly. Do not use more than 4 grams (4,000 milligrams) total of acetaminophen in one day.       Muscle relaxers help decrease muscle spasms and back pain.      Prescription pain medicine may be given. Ask your healthcare provider how to take this medicine safely. Some prescription pain medicines contain acetaminophen. Do not take other medicines that contain acetaminophen without talking to your healthcare provider. Too much acetaminophen may cause liver damage. Prescription pain medicine may cause constipation. Ask your healthcare provider how to prevent or treat constipation.       Take your medicine as directed. Contact your healthcare provider if you think your medicine is not helping or if you have side effects. Tell him or her if you are allergic to any medicine. Keep a list of the medicines, vitamins, and herbs you take. Include the amounts, and when and why you take them. Bring the list or the pill bottles to follow-up visits. Carry your medicine list with you in case of an emergency.    How to manage your back pain:     Apply ice on your back for 15 to 20 minutes every hour or as directed. Use an ice pack, or put crushed ice in a plastic bag. Cover it with a towel before you apply it to your skin. Ice helps prevent tissue damage and decreases pain.      Apply heat on your back for 20 to 30 minutes every 2 hours for as many days as directed. Heat helps decrease pain and muscle spasms.      Stay active as much as you can without causing more pain. Bed rest could make your back pain worse. Avoid heavy lifting until your pain is gone.      Go to physical therapy as directed. A physical therapist can teach you exercises to help improve movement and strength, and to decrease pain.    Follow up with your healthcare provider in 2 weeks, or as directed: Write down your questions so you remember to ask them during your visits.

## 2019-11-29 NOTE — ED ADULT NURSE NOTE - INTEGUMENTARY WDL
Chief Complaints and History of Present Illnesses   Patient presents with     Follow Up     Chief Complaint(s) and History of Present Illness(es)     Follow Up     Laterality: both eyes              Comments     3mo bilateral recheck for CNVM/radiation retinopathy     Tech only appt: [facility]    Katja Haynes COT 3:34 PM March 20, 2019                    Color consistent with ethnicity/race, warm, dry intact, resilient.

## 2019-11-29 NOTE — ED STATDOCS - PATIENT PORTAL LINK FT
You can access the FollowMyHealth Patient Portal offered by [facility] by registering at the following website: [email or web address]. By joining [facility]’s FollowMyHealth portal, you will also be able to view your health information using other applications (apps) compatible with our system.

## 2019-11-29 NOTE — ED STATDOCS - PROGRESS NOTE DETAILS
73 y/o F presents with low back pain since this morning. pt states she woke up with L sided low back pain and had difficulty standing up straight and walking to the bathroom. Denies urinary retention/incontinence, saddle anasthesias, weakness, numbness or tingling, radiation of pain, fever/chills, n/v, CP, SOB or other complaints at this time  Back: No midline tenderness. TTP L lumbar paraspinal muscles. LE muscle strength equal B/L . -Oliver Frost PA-C Results reviewed and discussed with pt. Reports improvement of sx with medications provided, will dc with tylenol/robaxin.  Discussed importance of close FU with PMD/ortho spine. Pt asked to return to ED immediately for any new or concerning sx or worsening. Pt acknowledges and understands plan -Oliver Frost PA-C

## 2019-11-29 NOTE — ED STATDOCS - CARE PROVIDER_API CALL
Almas Sylvester)  [facility]  [address] [place] [address]  Phone: [phone]  Fax: [phone]  Follow Up Time: Urgent

## 2019-11-30 ENCOUNTER — INPATIENT (INPATIENT)
Facility: HOSPITAL | Age: 74
LOS: 1 days | Discharge: HOME | DRG: 552 | End: 2019-12-02
Attending: INTERNAL MEDICINE | Admitting: INTERNAL MEDICINE
Payer: MEDICARE

## 2019-11-30 VITALS
DIASTOLIC BLOOD PRESSURE: 83 MMHG | TEMPERATURE: 98 F | HEIGHT: 68 IN | OXYGEN SATURATION: 98 % | HEART RATE: 80 BPM | RESPIRATION RATE: 18 BRPM | SYSTOLIC BLOOD PRESSURE: 142 MMHG | WEIGHT: 153 LBS

## 2019-11-30 DIAGNOSIS — M51.36 OTHER INTERVERTEBRAL DISC DEGENERATION, LUMBAR REGION: ICD-10-CM

## 2019-11-30 DIAGNOSIS — T84.093A OTHER MECHANICAL COMPLICATION OF INTERNAL LEFT KNEE PROSTHESIS, INITIAL ENCOUNTER: Chronic | ICD-10-CM

## 2019-11-30 DIAGNOSIS — M54.9 DORSALGIA, UNSPECIFIED: ICD-10-CM

## 2019-11-30 DIAGNOSIS — Z98.890 OTHER SPECIFIED POSTPROCEDURAL STATES: Chronic | ICD-10-CM

## 2019-11-30 DIAGNOSIS — Z96.651 PRESENCE OF RIGHT ARTIFICIAL KNEE JOINT: Chronic | ICD-10-CM

## 2019-11-30 DIAGNOSIS — M54.5 LOW BACK PAIN: ICD-10-CM

## 2019-11-30 LAB
ANION GAP SERPL CALC-SCNC: 7 MMOL/L — SIGNIFICANT CHANGE UP (ref 5–17)
APPEARANCE UR: CLEAR — SIGNIFICANT CHANGE UP
BASOPHILS # BLD AUTO: 0.04 K/UL — SIGNIFICANT CHANGE UP (ref 0–0.2)
BASOPHILS NFR BLD AUTO: 0.5 % — SIGNIFICANT CHANGE UP (ref 0–2)
BILIRUB UR-MCNC: NEGATIVE — SIGNIFICANT CHANGE UP
BUN SERPL-MCNC: 14 MG/DL — SIGNIFICANT CHANGE UP (ref 7–23)
CALCIUM SERPL-MCNC: 8.8 MG/DL — SIGNIFICANT CHANGE UP (ref 8.5–10.1)
CHLORIDE SERPL-SCNC: 110 MMOL/L — HIGH (ref 96–108)
CO2 SERPL-SCNC: 26 MMOL/L — SIGNIFICANT CHANGE UP (ref 22–31)
COLOR SPEC: YELLOW — SIGNIFICANT CHANGE UP
CREAT SERPL-MCNC: 0.91 MG/DL — SIGNIFICANT CHANGE UP (ref 0.5–1.3)
DIFF PNL FLD: ABNORMAL
EOSINOPHIL # BLD AUTO: 0.17 K/UL — SIGNIFICANT CHANGE UP (ref 0–0.5)
EOSINOPHIL NFR BLD AUTO: 2.3 % — SIGNIFICANT CHANGE UP (ref 0–6)
ERYTHROCYTE [SEDIMENTATION RATE] IN BLOOD: 15 MM/HR — SIGNIFICANT CHANGE UP (ref 0–20)
GLUCOSE SERPL-MCNC: 109 MG/DL — HIGH (ref 70–99)
GLUCOSE UR QL: NEGATIVE MG/DL — SIGNIFICANT CHANGE UP
HCT VFR BLD CALC: 34.9 % — SIGNIFICANT CHANGE UP (ref 34.5–45)
HGB BLD-MCNC: 11.1 G/DL — LOW (ref 11.5–15.5)
IMM GRANULOCYTES NFR BLD AUTO: 0.5 % — SIGNIFICANT CHANGE UP (ref 0–1.5)
KETONES UR-MCNC: ABNORMAL
LEUKOCYTE ESTERASE UR-ACNC: NEGATIVE — SIGNIFICANT CHANGE UP
LYMPHOCYTES # BLD AUTO: 1.54 K/UL — SIGNIFICANT CHANGE UP (ref 1–3.3)
LYMPHOCYTES # BLD AUTO: 20.4 % — SIGNIFICANT CHANGE UP (ref 13–44)
MCHC RBC-ENTMCNC: 28.1 PG — SIGNIFICANT CHANGE UP (ref 27–34)
MCHC RBC-ENTMCNC: 31.8 GM/DL — LOW (ref 32–36)
MCV RBC AUTO: 88.4 FL — SIGNIFICANT CHANGE UP (ref 80–100)
MONOCYTES # BLD AUTO: 0.84 K/UL — SIGNIFICANT CHANGE UP (ref 0–0.9)
MONOCYTES NFR BLD AUTO: 11.1 % — SIGNIFICANT CHANGE UP (ref 2–14)
NEUTROPHILS # BLD AUTO: 4.92 K/UL — SIGNIFICANT CHANGE UP (ref 1.8–7.4)
NEUTROPHILS NFR BLD AUTO: 65.2 % — SIGNIFICANT CHANGE UP (ref 43–77)
NITRITE UR-MCNC: NEGATIVE — SIGNIFICANT CHANGE UP
PH UR: 5 — SIGNIFICANT CHANGE UP (ref 5–8)
PLATELET # BLD AUTO: 223 K/UL — SIGNIFICANT CHANGE UP (ref 150–400)
POTASSIUM SERPL-MCNC: 3.9 MMOL/L — SIGNIFICANT CHANGE UP (ref 3.5–5.3)
POTASSIUM SERPL-SCNC: 3.9 MMOL/L — SIGNIFICANT CHANGE UP (ref 3.5–5.3)
PROT UR-MCNC: NEGATIVE MG/DL — SIGNIFICANT CHANGE UP
RBC # BLD: 3.95 M/UL — SIGNIFICANT CHANGE UP (ref 3.8–5.2)
RBC # FLD: 14.4 % — SIGNIFICANT CHANGE UP (ref 10.3–14.5)
SODIUM SERPL-SCNC: 143 MMOL/L — SIGNIFICANT CHANGE UP (ref 135–145)
SP GR SPEC: 1.02 — SIGNIFICANT CHANGE UP (ref 1.01–1.02)
UROBILINOGEN FLD QL: NEGATIVE MG/DL — SIGNIFICANT CHANGE UP
WBC # BLD: 7.55 K/UL — SIGNIFICANT CHANGE UP (ref 3.8–10.5)
WBC # FLD AUTO: 7.55 K/UL — SIGNIFICANT CHANGE UP (ref 3.8–10.5)

## 2019-11-30 PROCEDURE — 72114 X-RAY EXAM L-S SPINE BENDING: CPT

## 2019-11-30 PROCEDURE — 85652 RBC SED RATE AUTOMATED: CPT

## 2019-11-30 PROCEDURE — 97116 GAIT TRAINING THERAPY: CPT | Mod: GP

## 2019-11-30 PROCEDURE — 97162 PT EVAL MOD COMPLEX 30 MIN: CPT | Mod: GP

## 2019-11-30 PROCEDURE — 93010 ELECTROCARDIOGRAM REPORT: CPT

## 2019-11-30 PROCEDURE — 71045 X-RAY EXAM CHEST 1 VIEW: CPT | Mod: 26

## 2019-11-30 PROCEDURE — 81001 URINALYSIS AUTO W/SCOPE: CPT

## 2019-11-30 PROCEDURE — 36415 COLL VENOUS BLD VENIPUNCTURE: CPT

## 2019-11-30 PROCEDURE — 86140 C-REACTIVE PROTEIN: CPT

## 2019-11-30 PROCEDURE — 85025 COMPLETE CBC W/AUTO DIFF WBC: CPT

## 2019-11-30 PROCEDURE — 80048 BASIC METABOLIC PNL TOTAL CA: CPT

## 2019-11-30 RX ORDER — SULFASALAZINE 500 MG
1000 TABLET ORAL
Refills: 0 | Status: DISCONTINUED | OUTPATIENT
Start: 2019-11-30 | End: 2019-12-02

## 2019-11-30 RX ORDER — BUDESONIDE AND FORMOTEROL FUMARATE DIHYDRATE 160; 4.5 UG/1; UG/1
2 AEROSOL RESPIRATORY (INHALATION)
Qty: 0 | Refills: 0 | DISCHARGE

## 2019-11-30 RX ORDER — OXYCODONE HYDROCHLORIDE 5 MG/1
5 TABLET ORAL EVERY 4 HOURS
Refills: 0 | Status: DISCONTINUED | OUTPATIENT
Start: 2019-11-30 | End: 2019-11-30

## 2019-11-30 RX ORDER — CALCIUM CARBONATE 500(1250)
1 TABLET ORAL DAILY
Refills: 0 | Status: DISCONTINUED | OUTPATIENT
Start: 2019-11-30 | End: 2019-12-02

## 2019-11-30 RX ORDER — CERTOLIZUMAB PEGOL 400 MG
0 KIT SUBCUTANEOUS
Qty: 0 | Refills: 0 | DISCHARGE

## 2019-11-30 RX ORDER — APIXABAN 2.5 MG/1
5 TABLET, FILM COATED ORAL EVERY 12 HOURS
Refills: 0 | Status: DISCONTINUED | OUTPATIENT
Start: 2019-11-30 | End: 2019-12-02

## 2019-11-30 RX ORDER — ASPIRIN/CALCIUM CARB/MAGNESIUM 324 MG
81 TABLET ORAL DAILY
Refills: 0 | Status: DISCONTINUED | OUTPATIENT
Start: 2019-11-30 | End: 2019-12-02

## 2019-11-30 RX ORDER — DEXAMETHASONE 0.5 MG/5ML
8 ELIXIR ORAL ONCE
Refills: 0 | Status: COMPLETED | OUTPATIENT
Start: 2019-11-30 | End: 2019-11-30

## 2019-11-30 RX ORDER — ACETAMINOPHEN 500 MG
1000 TABLET ORAL EVERY 6 HOURS
Refills: 0 | Status: DISCONTINUED | OUTPATIENT
Start: 2019-11-30 | End: 2019-12-02

## 2019-11-30 RX ORDER — MORPHINE SULFATE 50 MG/1
4 CAPSULE, EXTENDED RELEASE ORAL EVERY 4 HOURS
Refills: 0 | Status: DISCONTINUED | OUTPATIENT
Start: 2019-11-30 | End: 2019-11-30

## 2019-11-30 RX ORDER — SENNA PLUS 8.6 MG/1
2 TABLET ORAL AT BEDTIME
Refills: 0 | Status: DISCONTINUED | OUTPATIENT
Start: 2019-11-30 | End: 2019-12-02

## 2019-11-30 RX ORDER — AMLODIPINE BESYLATE 2.5 MG/1
10 TABLET ORAL DAILY
Refills: 0 | Status: DISCONTINUED | OUTPATIENT
Start: 2019-11-30 | End: 2019-12-02

## 2019-11-30 RX ORDER — OXYCODONE HYDROCHLORIDE 5 MG/1
5 TABLET ORAL
Refills: 0 | Status: DISCONTINUED | OUTPATIENT
Start: 2019-11-30 | End: 2019-12-02

## 2019-11-30 RX ORDER — ATORVASTATIN CALCIUM 80 MG/1
10 TABLET, FILM COATED ORAL DAILY
Refills: 0 | Status: DISCONTINUED | OUTPATIENT
Start: 2019-11-30 | End: 2019-11-30

## 2019-11-30 RX ORDER — MORPHINE SULFATE 50 MG/1
4 CAPSULE, EXTENDED RELEASE ORAL ONCE
Refills: 0 | Status: DISCONTINUED | OUTPATIENT
Start: 2019-11-30 | End: 2019-11-30

## 2019-11-30 RX ORDER — DIAZEPAM 5 MG
5 TABLET ORAL ONCE
Refills: 0 | Status: DISCONTINUED | OUTPATIENT
Start: 2019-11-30 | End: 2019-11-30

## 2019-11-30 RX ORDER — OXYCODONE HYDROCHLORIDE 5 MG/1
10 TABLET ORAL EVERY 4 HOURS
Refills: 0 | Status: DISCONTINUED | OUTPATIENT
Start: 2019-11-30 | End: 2019-11-30

## 2019-11-30 RX ORDER — PREGABALIN 225 MG/1
1000 CAPSULE ORAL DAILY
Refills: 0 | Status: DISCONTINUED | OUTPATIENT
Start: 2019-11-30 | End: 2019-12-02

## 2019-11-30 RX ORDER — SODIUM CHLORIDE 9 MG/ML
1000 INJECTION INTRAMUSCULAR; INTRAVENOUS; SUBCUTANEOUS
Refills: 0 | Status: DISCONTINUED | OUTPATIENT
Start: 2019-11-30 | End: 2019-12-02

## 2019-11-30 RX ORDER — CYCLOBENZAPRINE HYDROCHLORIDE 10 MG/1
10 TABLET, FILM COATED ORAL THREE TIMES A DAY
Refills: 0 | Status: DISCONTINUED | OUTPATIENT
Start: 2019-11-30 | End: 2019-12-02

## 2019-11-30 RX ORDER — ATORVASTATIN CALCIUM 80 MG/1
10 TABLET, FILM COATED ORAL AT BEDTIME
Refills: 0 | Status: DISCONTINUED | OUTPATIENT
Start: 2019-11-30 | End: 2019-12-02

## 2019-11-30 RX ORDER — OXYCODONE HYDROCHLORIDE 5 MG/1
10 TABLET ORAL
Refills: 0 | Status: DISCONTINUED | OUTPATIENT
Start: 2019-11-30 | End: 2019-12-02

## 2019-11-30 RX ORDER — SODIUM CHLORIDE 9 MG/ML
1000 INJECTION INTRAMUSCULAR; INTRAVENOUS; SUBCUTANEOUS
Refills: 0 | Status: DISCONTINUED | OUTPATIENT
Start: 2019-11-30 | End: 2019-11-30

## 2019-11-30 RX ORDER — FOLIC ACID 0.8 MG
1 TABLET ORAL DAILY
Refills: 0 | Status: DISCONTINUED | OUTPATIENT
Start: 2019-11-30 | End: 2019-12-02

## 2019-11-30 RX ORDER — ONDANSETRON 8 MG/1
4 TABLET, FILM COATED ORAL EVERY 6 HOURS
Refills: 0 | Status: DISCONTINUED | OUTPATIENT
Start: 2019-11-30 | End: 2019-12-02

## 2019-11-30 RX ORDER — SODIUM CHLORIDE 9 MG/ML
1000 INJECTION INTRAMUSCULAR; INTRAVENOUS; SUBCUTANEOUS ONCE
Refills: 0 | Status: COMPLETED | OUTPATIENT
Start: 2019-11-30 | End: 2019-11-30

## 2019-11-30 RX ORDER — HYDROMORPHONE HYDROCHLORIDE 2 MG/ML
1 INJECTION INTRAMUSCULAR; INTRAVENOUS; SUBCUTANEOUS EVERY 4 HOURS
Refills: 0 | Status: DISCONTINUED | OUTPATIENT
Start: 2019-11-30 | End: 2019-12-02

## 2019-11-30 RX ORDER — ENOXAPARIN SODIUM 100 MG/ML
40 INJECTION SUBCUTANEOUS DAILY
Refills: 0 | Status: DISCONTINUED | OUTPATIENT
Start: 2019-11-30 | End: 2019-11-30

## 2019-11-30 RX ORDER — HYDROMORPHONE HYDROCHLORIDE 2 MG/ML
1 INJECTION INTRAMUSCULAR; INTRAVENOUS; SUBCUTANEOUS ONCE
Refills: 0 | Status: DISCONTINUED | OUTPATIENT
Start: 2019-11-30 | End: 2019-11-30

## 2019-11-30 RX ORDER — ONDANSETRON 8 MG/1
4 TABLET, FILM COATED ORAL ONCE
Refills: 0 | Status: COMPLETED | OUTPATIENT
Start: 2019-11-30 | End: 2019-11-30

## 2019-11-30 RX ADMIN — MORPHINE SULFATE 4 MILLIGRAM(S): 50 CAPSULE, EXTENDED RELEASE ORAL at 09:50

## 2019-11-30 RX ADMIN — Medication 5 MILLIGRAM(S): at 07:36

## 2019-11-30 RX ADMIN — HYDROMORPHONE HYDROCHLORIDE 1 MILLIGRAM(S): 2 INJECTION INTRAMUSCULAR; INTRAVENOUS; SUBCUTANEOUS at 08:10

## 2019-11-30 RX ADMIN — ONDANSETRON 4 MILLIGRAM(S): 8 TABLET, FILM COATED ORAL at 09:48

## 2019-11-30 RX ADMIN — HYDROMORPHONE HYDROCHLORIDE 1 MILLIGRAM(S): 2 INJECTION INTRAMUSCULAR; INTRAVENOUS; SUBCUTANEOUS at 14:30

## 2019-11-30 RX ADMIN — CYCLOBENZAPRINE HYDROCHLORIDE 10 MILLIGRAM(S): 10 TABLET, FILM COATED ORAL at 22:12

## 2019-11-30 RX ADMIN — SODIUM CHLORIDE 1000 MILLILITER(S): 9 INJECTION INTRAMUSCULAR; INTRAVENOUS; SUBCUTANEOUS at 09:40

## 2019-11-30 RX ADMIN — HYDROMORPHONE HYDROCHLORIDE 1 MILLIGRAM(S): 2 INJECTION INTRAMUSCULAR; INTRAVENOUS; SUBCUTANEOUS at 14:14

## 2019-11-30 RX ADMIN — SODIUM CHLORIDE 1000 MILLILITER(S): 9 INJECTION INTRAMUSCULAR; INTRAVENOUS; SUBCUTANEOUS at 09:50

## 2019-11-30 RX ADMIN — OXYCODONE HYDROCHLORIDE 10 MILLIGRAM(S): 5 TABLET ORAL at 18:42

## 2019-11-30 RX ADMIN — Medication 81 MILLIGRAM(S): at 14:14

## 2019-11-30 RX ADMIN — ATORVASTATIN CALCIUM 10 MILLIGRAM(S): 80 TABLET, FILM COATED ORAL at 22:12

## 2019-11-30 RX ADMIN — SODIUM CHLORIDE 100 MILLILITER(S): 9 INJECTION INTRAMUSCULAR; INTRAVENOUS; SUBCUTANEOUS at 23:12

## 2019-11-30 RX ADMIN — Medication 8 MILLIGRAM(S): at 07:36

## 2019-11-30 RX ADMIN — Medication 200 MILLIGRAM(S): at 18:42

## 2019-11-30 RX ADMIN — HYDROMORPHONE HYDROCHLORIDE 1 MILLIGRAM(S): 2 INJECTION INTRAMUSCULAR; INTRAVENOUS; SUBCUTANEOUS at 07:37

## 2019-11-30 RX ADMIN — SODIUM CHLORIDE 2000 MILLILITER(S): 9 INJECTION INTRAMUSCULAR; INTRAVENOUS; SUBCUTANEOUS at 07:37

## 2019-11-30 RX ADMIN — ONDANSETRON 4 MILLIGRAM(S): 8 TABLET, FILM COATED ORAL at 07:36

## 2019-11-30 RX ADMIN — Medication 1000 MILLIGRAM(S): at 18:41

## 2019-11-30 RX ADMIN — SODIUM CHLORIDE 100 MILLILITER(S): 9 INJECTION INTRAMUSCULAR; INTRAVENOUS; SUBCUTANEOUS at 14:19

## 2019-11-30 RX ADMIN — APIXABAN 5 MILLIGRAM(S): 2.5 TABLET, FILM COATED ORAL at 18:42

## 2019-11-30 NOTE — ED PROVIDER NOTE - OBJECTIVE STATEMENT
75 y/o F with a PMHx of CVA, Afib s/p pacemaker, on eliquis, HTN, RA, herniated discs presents to the ED c/o low back pain beginning yesterday after [id number] all day on thanksgiving. no bowel or bladder incontinence. was here yesterday given medications for pain went home on a muscle relaxer and was unable to get up this morning due to pain.

## 2019-11-30 NOTE — H&P ADULT - ASSESSMENT
74y old female with PMHx of CVA with residual unsteady gait, PAF on eliquis, HTN, HLD, RA, herniated discs, b/l TKR, and fatty liver disease who presents with a chief complaint of back pain, inability to walk which began friday morning after cooking all day thursday for thanksgiving.      #Intractable Back Pain:    Suspect more paraspinal muscle spasm related to standing/ cooking all day for thanksgiving.    No signs/ sx of radiculopathy.    Flexeril 10 TID.    Oxycodone/ Dilaudid PRN pain.    Spine eval-- Dr. Caldwell.    CT lumbar spine noted-- suspect more chronic changes.  Patient admits to hx of herniated discs for years.  Use to be weight  in past.    PT eval.      #PAF:    Cont eliquis.    S/p PPM for sinus pauses.    Stable.      #Hx of CVA:    Minimal residual deficit with balance as per patient.      #HTN:    Cont home meds.      #HLD:    Cont home meds.      #RA:    Cont home meds.    Also is on monthly infusions with Dr. JENELLE Guadalupe.      #DVT Proph:    Eliquis.

## 2019-11-30 NOTE — H&P ADULT - NSHPREVIEWOFSYSTEMS_GEN_ALL_CORE
ROS:  General:  No fevers, chills, or unexplained weight loss  Skin: No rash or bothersome skin lesions  Musculoskeletal: No arthalgias, myalgias or joint swelling  Eyes: No visual changes or eye pain  Ears: No hearing loss , otorrhea or ear pain  Nose, Mouth, Throat: No nasal congestion, rhinorrhea, oral lesions, postnasal drip or sore throat  Cardio: No chest pain or palpitations. no lower extremity edema. no syncope. no claudication.   Respiratory: No cough, shortness of breath or wheezing   GI: No diarrhea, constipation, blood in stools, abdominal pain, vomiting or heartburn  : No urinary frequency, hematuria, incontinence, or dysuria  Neurologic: No headaches, parasthesias, confusion, dysarthria or gait instability  Psychiatric:  No anxiety or depression  Lymphatic:  No easy bruising, easy bleeding or swollen glands  Allergic: No itching, sneezing , watery eyes, clear rhinorrhea or recurrent infections ROS:  General:  No fevers, chills, or unexplained weight loss  Skin: No rash or bothersome skin lesions  Musculoskeletal: back pain as above  Eyes: No visual changes or eye pain  Ears: No hearing loss , otorrhea or ear pain  Nose, Mouth, Throat: No nasal congestion, rhinorrhea, oral lesions, postnasal drip or sore throat  Cardio: No chest pain or palpitations. no lower extremity edema. no syncope. no claudication.   Respiratory: No cough, shortness of breath or wheezing   GI: n/ v/ as above  : No urinary frequency, hematuria, incontinence, or dysuria  Neurologic: No headaches, parasthesias, confusion, dysarthria or gait instability  Psychiatric:  No anxiety or depression  Lymphatic:  No easy bruising, easy bleeding or swollen glands  Allergic: No itching, sneezing , watery eyes, clear rhinorrhea or recurrent infections

## 2019-11-30 NOTE — H&P ADULT - NSICDXPASTMEDICALHX_GEN_ALL_CORE_FT
PAST MEDICAL HISTORY:  Afib     CVA (cerebral vascular accident)     Dizziness     Fatty liver     HTN (hypertension)     Pancreatic lesion     RA (rheumatoid arthritis)     Unsteady gait due to unstable Left knee

## 2019-11-30 NOTE — H&P ADULT - NSHPPHYSICALEXAM_GEN_ALL_CORE
PEx  T(C): 36.9 (11-30-19 @ 09:55), Max: 36.9 (11-30-19 @ 09:55)  HR: 72 (11-30-19 @ 09:55) (72 - 96)  BP: 127/78 (11-30-19 @ 09:55) (127/78 - 156/75)  RR: 16 (11-30-19 @ 09:55) (16 - 18)  SpO2: 98% (11-30-19 @ 09:55) (97% - 99%)    General:     Well appearing, well nourished in no distress, no identifying marks , scars, or tattoos.  Skin: no rash or prominent lesions  Head: normocephalic, atraumatic     Sinuses: non-tender  Nose: no external lesions, mucosa non-inflamed, septum and turbinates normal  Throat: no erythema, exudates or lesions.  Neck: Supple without lymphadenopathy. Thyroid no thyromegaly, no palpable thyroid nodules, no palpable nodules or masses, carotid arteries no bruits.   Breasts: No palpable masses or lesions.  Heart: RRR, no murmur or gallop.  Normal S1, S2.  No S3, S4.   Lungs: CTA bilaterally, no wheezes, rhonchi, rales.  Breathing unlabored.   Chest wall: Normal insp   Abdomen:  Soft, NT/ND, normal bowel sounds, no HSM, no masses.  No peritoneal signs.   Back: spine normal without deformity or tenderness.  Normal ROM   : Exam normal.  no inguinal hernias.  Extremities: No deformities, clubbing, cyanosis, or edema.  Musculoskeletal: Normal gait and station. No decreased range of motion, instability, atrophy or abnormal strength or tone in the head, neck, spine, ribs, pelvis or extremities.   Neurologic: CN 2-12 normal. Sensation to pain, touch and proprioception normal. DTRs normal in upper and lower extremities. No pathologic reflexes.  Motor normal.  Psychiatric: Oriented X3, intact recent and remote memory, judgement and insight, normal mood and affect. PEx  T(C): 36.9 (11-30-19 @ 09:55), Max: 36.9 (11-30-19 @ 09:55)  HR: 72 (11-30-19 @ 09:55) (72 - 96)  BP: 127/78 (11-30-19 @ 09:55) (127/78 - 156/75)  RR: 16 (11-30-19 @ 09:55) (16 - 18)  SpO2: 98% (11-30-19 @ 09:55) (97% - 99%)    General:     Well appearing, well nourished in no distress, no identifying marks , scars, or tattoos.  Skin: no rash or prominent lesions  Head: normocephalic, atraumatic     Sinuses: non-tender  Nose: no external lesions, mucosa non-inflamed, septum and turbinates normal  Throat: no erythema, exudates or lesions.  Neck: Supple without lymphadenopathy. Thyroid no thyromegaly, no palpable thyroid nodules, no palpable nodules or masses, carotid arteries no bruits.   Breasts: No palpable masses or lesions.  Heart: RRR, no murmur or gallop.  Normal S1, S2.  No S3, S4.   Lungs: CTA bilaterally, no wheezes, rhonchi, rales.  Breathing unlabored.   Chest wall: Normal insp   Abdomen:  Soft, NT/ND, normal bowel sounds, no HSM, no masses.  No peritoneal signs.   Back: tenderness in paraspinal musculature lower back  : Exam normal.  no inguinal hernias.  Extremities: No deformities, clubbing, cyanosis, or edema.  Musculoskeletal: Normal gait and station. No decreased range of motion, instability, atrophy or abnormal strength or tone in the head, neck, spine, ribs, pelvis or extremities.   Neurologic: CN 2-12 normal. Sensation to pain, touch and proprioception normal. DTRs normal in upper and lower extremities. No pathologic reflexes.  Motor normal.  Psychiatric: Oriented X3, intact recent and remote memory, judgement and insight, normal mood and affect.

## 2019-11-30 NOTE — CONSULT NOTE ADULT - PROBLEM SELECTOR RECOMMENDATION 9
full note dictated  needs cardiac eval  needs spine work up    all questions answered  Thank you  will follow

## 2019-11-30 NOTE — H&P ADULT - NSICDXPASTSURGICALHX_GEN_ALL_CORE_FT
PAST SURGICAL HISTORY:  Failed total left knee replacement 6/28/2016    H/O cataract extraction, right 1/2015    H/O colonoscopy     H/O dilation and curettage 5/2016    H/O hernia repair 1995    H/O total knee replacement, right 10/13/2016

## 2019-11-30 NOTE — ED ADULT TRIAGE NOTE - CHIEF COMPLAINT QUOTE
Patient c/o lower back pain. Was seen in ED yesterday and given percocet for pain which is the only thing that works.

## 2019-11-30 NOTE — ED PROVIDER NOTE - ENMT, MLM
Airway patent,. Mouth with normal mucosa. Throat has no vesicles, no oropharyngeal exudates and uvula is midline.

## 2019-11-30 NOTE — ED PROVIDER NOTE - CONSTITUTIONAL, MLM
normal... Well appearing, awake, alert, oriented to person, place, time/situation and in severe distress.

## 2019-11-30 NOTE — ED ADULT NURSE REASSESSMENT NOTE - NS ED NURSE REASSESS COMMENT FT1
Pt states her pain is improving. Tolerating pain meds well. Pt states she is still unable to urinate at this time. Will ctm

## 2019-11-30 NOTE — H&P ADULT - HISTORY OF PRESENT ILLNESS
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  Patient notes back pain started yesterday 11/29.  Patient was standing cooking all day for Thanksgiving on Thursday 11/28.  When she woke up friday morning, she could not get OOB.  She presented to  ER and was evaluated.  She had a CT showing multilevel herniated discs which she knew about in the past, was given muscle relaxers and discharged.  Pain has not improved.  It took her and her  30 min to get to the bathroom this morning from her bed.  She returns for intractable back pain.  Pain is in her lower back and worse on the left side.  Pain nearly resolves when she is lying flat on her back with a pillow under her legs.  Worse with any movement.  She denies any sx of radiculopathy-- no numbness or tingling or weakness in her legs.  Pain does not radiate into legs.  She has not eaten much due to pain in the last 48 hours and had an episode of vomiting earlier today.  In ER today, she was given valium/ dilaudid/ decadron with some improvement in pain.      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  Exsmoker.  ~10 pack years in 20's.  No alcohol.  No drugs.      Allergies:  Methotrexate Sodium-- developed pneumonia after taking it

## 2019-11-30 NOTE — CONSULT NOTE ADULT - SUBJECTIVE AND OBJECTIVE BOX
full note dictated  severe LBP x a few days  worse after cooking for Thanksgiving  no leg pain  no BB compl  no weakness    exam   motor intact  brace in place  difficulty moving around in bed\    CT  severe DDD  cannot r/o discitis  most likely DDD  pt afeb  needs workup

## 2019-11-30 NOTE — ED PROVIDER NOTE - MUSCULOSKELETAL MINIMAL EXAM
lower lumbar midline ttp no step offs no deformities/MUSCLE SPASMS/RANGE OF MOTION LIMITED/TENDERNESS

## 2019-11-30 NOTE — H&P ADULT - NSHPLABSRESULTS_GEN_ALL_CORE
11.1   7.55  )-----------( 223      ( 30 Nov 2019 07:19 )             34.9     11-30    143  |  110<H>  |  14  ----------------------------<  109<H>  3.9   |  26  |  0.91    Ca    8.8      30 Nov 2019 07:19    TPro  7.3  /  Alb  3.7  /  TBili  0.6  /  DBili  x   /  AST  26  /  ALT  16  /  AlkPhos  138<H>  11-29    CAPILLARY BLOOD GLUCOSE

## 2019-11-30 NOTE — ED ADULT NURSE REASSESSMENT NOTE - NS ED NURSE REASSESS COMMENT FT1
Received pt from RHONDA Meyer. Pt laying on stretcher co back pain.  at the bedside. MD still to see the pt; no orders as of yet. Will ctm

## 2019-12-01 LAB
ALBUMIN SERPL ELPH-MCNC: 4.3 G/DL
ALP BLD-CCNC: 131 U/L
ALT SERPL-CCNC: 11 U/L
ANION GAP SERPL CALC-SCNC: 16 MMOL/L
ANION GAP SERPL CALC-SCNC: 5 MMOL/L — SIGNIFICANT CHANGE UP (ref 5–17)
AST SERPL-CCNC: 20 U/L
BASOPHILS # BLD AUTO: 0.01 K/UL — SIGNIFICANT CHANGE UP (ref 0–0.2)
BASOPHILS # BLD AUTO: 0.06 K/UL
BASOPHILS NFR BLD AUTO: 0.2 % — SIGNIFICANT CHANGE UP (ref 0–2)
BASOPHILS NFR BLD AUTO: 1 %
BILIRUB SERPL-MCNC: 0.4 MG/DL
BUN SERPL-MCNC: 11 MG/DL
BUN SERPL-MCNC: 17 MG/DL — SIGNIFICANT CHANGE UP (ref 7–23)
CALCIUM SERPL-MCNC: 8.5 MG/DL — SIGNIFICANT CHANGE UP (ref 8.5–10.1)
CALCIUM SERPL-MCNC: 9.2 MG/DL
CHLORIDE SERPL-SCNC: 106 MMOL/L
CHLORIDE SERPL-SCNC: 112 MMOL/L — HIGH (ref 96–108)
CO2 SERPL-SCNC: 22 MMOL/L
CO2 SERPL-SCNC: 26 MMOL/L — SIGNIFICANT CHANGE UP (ref 22–31)
CREAT SERPL-MCNC: 0.64 MG/DL — SIGNIFICANT CHANGE UP (ref 0.5–1.3)
CREAT SERPL-MCNC: 0.79 MG/DL
CRP SERPL-MCNC: 1.37 MG/DL
CRP SERPL-MCNC: 6.46 MG/DL — HIGH (ref 0–0.4)
EOSINOPHIL # BLD AUTO: 0 K/UL — SIGNIFICANT CHANGE UP (ref 0–0.5)
EOSINOPHIL # BLD AUTO: 0.2 K/UL
EOSINOPHIL NFR BLD AUTO: 0 % — SIGNIFICANT CHANGE UP (ref 0–6)
EOSINOPHIL NFR BLD AUTO: 3.3 %
ERYTHROCYTE [SEDIMENTATION RATE] IN BLOOD BY WESTERGREN METHOD: 5 MM/HR
GLUCOSE SERPL-MCNC: 109 MG/DL — HIGH (ref 70–99)
GLUCOSE SERPL-MCNC: 83 MG/DL
HCT VFR BLD CALC: 31.6 % — LOW (ref 34.5–45)
HCT VFR BLD CALC: 36.4 %
HGB BLD-MCNC: 10 G/DL — LOW (ref 11.5–15.5)
HGB BLD-MCNC: 11 G/DL
IMM GRANULOCYTES NFR BLD AUTO: 0.3 %
IMM GRANULOCYTES NFR BLD AUTO: 0.4 % — SIGNIFICANT CHANGE UP (ref 0–1.5)
LYMPHOCYTES # BLD AUTO: 0.76 K/UL — LOW (ref 1–3.3)
LYMPHOCYTES # BLD AUTO: 1.53 K/UL
LYMPHOCYTES # BLD AUTO: 15.8 % — SIGNIFICANT CHANGE UP (ref 13–44)
LYMPHOCYTES NFR BLD AUTO: 25.3 %
MAN DIFF?: NORMAL
MCHC RBC-ENTMCNC: 27.8 PG
MCHC RBC-ENTMCNC: 28.1 PG — SIGNIFICANT CHANGE UP (ref 27–34)
MCHC RBC-ENTMCNC: 30.2 GM/DL
MCHC RBC-ENTMCNC: 31.6 GM/DL — LOW (ref 32–36)
MCV RBC AUTO: 88.8 FL — SIGNIFICANT CHANGE UP (ref 80–100)
MCV RBC AUTO: 92.2 FL
MONOCYTES # BLD AUTO: 0.53 K/UL — SIGNIFICANT CHANGE UP (ref 0–0.9)
MONOCYTES # BLD AUTO: 0.74 K/UL
MONOCYTES NFR BLD AUTO: 11 % — SIGNIFICANT CHANGE UP (ref 2–14)
MONOCYTES NFR BLD AUTO: 12.2 %
NEUTROPHILS # BLD AUTO: 3.5 K/UL
NEUTROPHILS # BLD AUTO: 3.5 K/UL — SIGNIFICANT CHANGE UP (ref 1.8–7.4)
NEUTROPHILS NFR BLD AUTO: 57.9 %
NEUTROPHILS NFR BLD AUTO: 72.6 % — SIGNIFICANT CHANGE UP (ref 43–77)
PLATELET # BLD AUTO: 196 K/UL — SIGNIFICANT CHANGE UP (ref 150–400)
PLATELET # BLD AUTO: 242 K/UL
POTASSIUM SERPL-MCNC: 4.2 MMOL/L — SIGNIFICANT CHANGE UP (ref 3.5–5.3)
POTASSIUM SERPL-SCNC: 4.2 MMOL/L — SIGNIFICANT CHANGE UP (ref 3.5–5.3)
POTASSIUM SERPL-SCNC: 4.9 MMOL/L
PROT SERPL-MCNC: 6.8 G/DL
RBC # BLD: 3.56 M/UL — LOW (ref 3.8–5.2)
RBC # BLD: 3.95 M/UL
RBC # FLD: 14 % — SIGNIFICANT CHANGE UP (ref 10.3–14.5)
RBC # FLD: 14.6 %
SODIUM SERPL-SCNC: 143 MMOL/L — SIGNIFICANT CHANGE UP (ref 135–145)
SODIUM SERPL-SCNC: 144 MMOL/L
WBC # BLD: 4.82 K/UL — SIGNIFICANT CHANGE UP (ref 3.8–10.5)
WBC # FLD AUTO: 4.82 K/UL — SIGNIFICANT CHANGE UP (ref 3.8–10.5)
WBC # FLD AUTO: 6.05 K/UL

## 2019-12-01 RX ADMIN — Medication 75 MILLIGRAM(S): at 05:34

## 2019-12-01 RX ADMIN — Medication 1 MILLIGRAM(S): at 13:02

## 2019-12-01 RX ADMIN — AMLODIPINE BESYLATE 10 MILLIGRAM(S): 2.5 TABLET ORAL at 05:35

## 2019-12-01 RX ADMIN — Medication 1 TABLET(S): at 13:02

## 2019-12-01 RX ADMIN — SODIUM CHLORIDE 100 MILLILITER(S): 9 INJECTION INTRAMUSCULAR; INTRAVENOUS; SUBCUTANEOUS at 17:56

## 2019-12-01 RX ADMIN — Medication 1000 MILLIGRAM(S): at 17:55

## 2019-12-01 RX ADMIN — PREGABALIN 1000 MICROGRAM(S): 225 CAPSULE ORAL at 13:02

## 2019-12-01 RX ADMIN — CYCLOBENZAPRINE HYDROCHLORIDE 10 MILLIGRAM(S): 10 TABLET, FILM COATED ORAL at 05:35

## 2019-12-01 RX ADMIN — CYCLOBENZAPRINE HYDROCHLORIDE 10 MILLIGRAM(S): 10 TABLET, FILM COATED ORAL at 21:33

## 2019-12-01 RX ADMIN — ATORVASTATIN CALCIUM 10 MILLIGRAM(S): 80 TABLET, FILM COATED ORAL at 21:33

## 2019-12-01 RX ADMIN — APIXABAN 5 MILLIGRAM(S): 2.5 TABLET, FILM COATED ORAL at 17:56

## 2019-12-01 RX ADMIN — Medication 81 MILLIGRAM(S): at 13:02

## 2019-12-01 RX ADMIN — Medication 75 MILLIGRAM(S): at 17:56

## 2019-12-01 RX ADMIN — CYCLOBENZAPRINE HYDROCHLORIDE 10 MILLIGRAM(S): 10 TABLET, FILM COATED ORAL at 13:02

## 2019-12-01 RX ADMIN — APIXABAN 5 MILLIGRAM(S): 2.5 TABLET, FILM COATED ORAL at 05:34

## 2019-12-01 RX ADMIN — Medication 1000 MILLIGRAM(S): at 05:34

## 2019-12-01 NOTE — CONSULT NOTE ADULT - ASSESSMENT
A/P: 74y old female with PMHx of CVA with residual unsteady gait, PAF on eliquis, HTN, HLD, RA, herniated discs, b/l TKR, and fatty liver disease   who presents with a chief complaint of back pain, inability to walk which began friday morning after cooking all day thursday for thanksgiving.      1. Back pain. Mgmt as per ortho. Cont pain control.    2. PAF. Now in SR. Cont outpt regimen. Cont eliquis for cva proph.    3. HTN. Well controlled. Cont outpt regimen.  Recent outpt echo with Nl LV fxn.     4. Hyperlipidemia. Cont statin.     5. Pt has ischemic eval pending in near future as outpt.     6. DVT proph.

## 2019-12-01 NOTE — PROGRESS NOTE ADULT - SUBJECTIVE AND OBJECTIVE BOX
· Subjective and Objective: 	  74y old female with PMHx of CVA with residual unsteady gait, PAF on eliquis, HTN, HLD, RA, herniated discs, b/l TKR, and fatty liver disease who presents with a chief complaint of back pain, inability to walk.  Patient notes back pain started yesterday 11/29.  Patient was standing cooking all day for Thanksgiving on Thursday 11/28.  When she woke up friday morning, she could not get OOB.  She presented to  ER and was evaluated.  She had a CT showing multilevel herniated discs which she knew about in the past, was given muscle relaxers and discharged.  Pain has not improved.  It took her and her  30 min to get to the bathroom this morning from her bed.  She returns for intractable back pain.  Pain is in her lower back and worse on the left side.  Pain nearly resolves when she is lying flat on her back with a pillow under her legs.  Worse with any movement.  She denies any sx of radiculopathy-- no numbness or tingling or weakness in her legs.  Pain does not radiate into legs.  She has not eaten much due to pain in the last 48 hours and had an episode of vomiting earlier today.  In ER today, she was given valium/ dilaudid/ decadron with some improvement in pain.       12/01/19: Patient seen and examined. Back pain improving.  Was able to walk today with less pain.          Vital Signs Last 24 Hrs  T(C): 36.6 (01 Dec 2019 11:14), Max: 36.7 (30 Nov 2019 16:42)  T(F): 97.9 (01 Dec 2019 11:14), Max: 98 (30 Nov 2019 16:42)  HR: 69 (01 Dec 2019 11:14) (69 - 86)  BP: 15/57 (01 Dec 2019 11:14) (15/57 - 140/81)  BP(mean): --  RR: 18 (01 Dec 2019 11:14) (18 - 18)  SpO2: 97% (01 Dec 2019 11:14) (94% - 100%)      Physical Exam:       	General:     Well appearing, well nourished in no distress, no identifying marks , scars, or tattoos.  	Skin: no rash or prominent lesions  	Head: normocephalic, atraumatic     	Sinuses: non-tender  	Nose: no external lesions, mucosa non-inflamed, septum and turbinates normal  	Throat: no erythema, exudates or lesions.  	Neck: Supple without lymphadenopathy. Thyroid no thyromegaly, no palpable thyroid nodules, no palpable nodules or masses, carotid arteries no bruits.   	Breasts: No palpable masses or lesions.  	Heart: RRR, no murmur or gallop.  Normal S1, S2.  No S3, S4.   	Lungs: CTA bilaterally, no wheezes, rhonchi, rales.  Breathing unlabored.   	Chest wall: Normal insp   	Abdomen:  Soft, NT/ND, normal bowel sounds, no HSM, no masses.  No peritoneal signs.   	Back: tenderness in paraspinal musculature lower back  	: Exam normal.  no inguinal hernias.  	Extremities: No deformities, clubbing, cyanosis, or edema.  No calf temderness  	Musculoskeletal: Normal gait and station. No decreased range of motion, instability, atrophy or abnormal strength or tone in the head, neck, spine, ribs, pelvis or extremities.   	Neurologic: CN 2-12 normal. Sensation to pain, touch and proprioception normal. DTRs normal in upper and lower extremities. No pathologic reflexes.  Motor normal.  Psychiatric: Oriented X3, intact recent and remote memory, judgement and insight, normal mood and affect.                            10.0   4.82  )-----------( 196      ( 01 Dec 2019 06:44 )             31.6     01 Dec 2019 06:44    143    |  112    |  17     ----------------------------<  109    4.2     |  26     |  0.64     Ca    8.5        01 Dec 2019 06:44        MEDICATIONS  (STANDING):  amLODIPine   Tablet 10 milliGRAM(s) Oral daily  apixaban 5 milliGRAM(s) Oral every 12 hours  aspirin  chewable 81 milliGRAM(s) Oral daily  atorvastatin 10 milliGRAM(s) Oral at bedtime  calcium carbonate    500 mG (Tums) Chewable 1 Tablet(s) Chew daily  cyanocobalamin 1000 MICROGram(s) Oral daily  cyclobenzaprine 10 milliGRAM(s) Oral three times a day  folic acid 1 milliGRAM(s) Oral daily  pregabalin 75 milliGRAM(s) Oral two times a day  ranitidine  Oral Tab/Cap - Peds 150 milliGRAM(s) Oral two times a day  sodium chloride 0.9%. 1000 milliLiter(s) (100 mL/Hr) IV Continuous <Continuous>  sulfaSALAzine 1000 milliGRAM(s) Oral two times a day    MEDICATIONS  (PRN):  acetaminophen   Tablet .. 1000 milliGRAM(s) Oral every 6 hours PRN Mild Pain (1 - 3)  aluminum hydroxide/magnesium hydroxide/simethicone Suspension 30 milliLiter(s) Oral every 4 hours PRN Dyspepsia  HYDROmorphone  Injectable 1 milliGRAM(s) IV Push every 4 hours PRN Severe Pain (7 - 10)  ondansetron Injectable 4 milliGRAM(s) IV Push every 6 hours PRN Nausea and/or Vomiting  oxyCODONE    IR 5 milliGRAM(s) Oral every 3 hours PRN Mild Pain (1 - 3)  oxyCODONE    IR 10 milliGRAM(s) Oral every 3 hours PRN Moderate Pain (4 - 6)  senna 2 Tablet(s) Oral at bedtime PRN Constipation    EXAM:  CT LUMBAR SPINE                            PROCEDURE DATE:  11/29/2019          INTERPRETATION:    MR lumbar without gadolinium       CLINICAL INFORMATION: Lumbar spondylosis.    TECHNIQUE:   Sagittal and axial T1-weighted images, sagittal STIR images,   sagittal T2-weighted images and axial T1 and T2-weighted images of the   lumbar spine were obtained.    FINDINGS:   No prior similar studies are available for review.    Lumbar vertebral alignment is significant for grade 1 anterior   spondylolisthesis at L4-5 on a degenerative basis. Dextroscoliosis noted   with apex at L3.  Lumbar vertebral body heights are maintained.  Marrow   signal intensity within lumbar vertebral bodies and posterior elements is   unremarkable.  No osseous expansion, epidural disease or paraspinal   abnormality is found.    Lumbar intervertebral discs show multilevel degenerative disc disease and   spondylosis with disc bulges at L1-2 through L5-S1 which flatten the   ventral thecal sac and narrow the BILATERAL neural foramina. Mild central   stenosis at L2-3 and L3-4 and L4-5 on a degenerative basis due to disc   bulge, facet osteophytic hypertrophy and redundancy of ligamentum flavum.   Moderate facet osteophytic hypertrophy at L5-S1.    The distal cord maintains intact morphology.  Distal cord signal   intensity is preserved.  The conus is normally positioned at the T12   level.  Nerve roots of the cauda equina appear intact.        IMPRESSION:   Grade 1 anterior spondylolisthesis at L4-5 on a   degenerative basis. Dextroscoliosis noted with apex at L3.  Multilevel   degenerative disc disease and spondylosis with disc bulges at L1-2   through L5-S1 which flatten the ventral thecal sac and narrow the   BILATERAL neural foramina. Mild central stenosis at L2-3 and L3-4 and   L4-5 on a degenerative basis         Assessment and Plan:    Assessment:  · Assessment		  74y old female with PMHx of CVA with residual unsteady gait, PAF on eliquis, HTN, HLD, RA, herniated discs, b/l TKR, and fatty liver disease who presents with a chief complaint of back pain    #Intractable Back Pain:    Suspect more paraspinal muscle spasm related to standing/ cooking all day for thanksgiving.    No signs/ sx of radiculopathy.    Flexeril 10 TID.    Oxycodone/ Dilaudid PRN pain.    Cardiology eval appreciated - need clearance for MRI.   OOB c PT    #DVT Proph:    Eliquis.

## 2019-12-01 NOTE — PROGRESS NOTE ADULT - SUBJECTIVE AND OBJECTIVE BOX
74y old female with PMHx of CVA with residual unsteady gait, PAF on eliquis, HTN, HLD, RA, herniated discs, b/l TKR, and fatty liver disease who presents with a chief complaint of back pain, inability to walk.  Patient notes back pain started yesterday 11/29.  Patient was standing cooking all day for Thanksgiving on Thursday 11/28.  When she woke up friday morning, she could not get OOB.  She presented to  ER and was evaluated.  She had a CT showing multilevel herniated discs which she knew about in the past, was given muscle relaxers and discharged.  Pain has not improved.  It took her and her  30 min to get to the bathroom this morning from her bed.  She returns for intractable back pain.  Pain is in her lower back and worse on the left side.  Pain nearly resolves when she is lying flat on her back with a pillow under her legs.  Worse with any movement.  She denies any sx of radiculopathy-- no numbness or tingling or weakness in her legs.  Pain does not radiate into legs.  She has not eaten much due to pain in the last 48 hours and had an episode of vomiting earlier today.  In ER today, she was given valium/ dilaudid/ decadron with some improvement in pain.       12/01/19: Patient seen and examined. Back pain improving.         Vital Signs Last 24 Hrs  T(C): 36.6 (01 Dec 2019 11:14), Max: 36.7 (30 Nov 2019 16:42)  T(F): 97.9 (01 Dec 2019 11:14), Max: 98 (30 Nov 2019 16:42)  HR: 69 (01 Dec 2019 11:14) (69 - 86)  BP: 15/57 (01 Dec 2019 11:14) (15/57 - 140/81)  BP(mean): --  RR: 18 (01 Dec 2019 11:14) (18 - 18)  SpO2: 97% (01 Dec 2019 11:14) (94% - 100%)      Physical Exam:       	General:     Well appearing, well nourished in no distress, no identifying marks , scars, or tattoos.  	Skin: no rash or prominent lesions  	Head: normocephalic, atraumatic     	Sinuses: non-tender  	Nose: no external lesions, mucosa non-inflamed, septum and turbinates normal  	Throat: no erythema, exudates or lesions.  	Neck: Supple without lymphadenopathy. Thyroid no thyromegaly, no palpable thyroid nodules, no palpable nodules or masses, carotid arteries no bruits.   	Breasts: No palpable masses or lesions.  	Heart: RRR, no murmur or gallop.  Normal S1, S2.  No S3, S4.   	Lungs: CTA bilaterally, no wheezes, rhonchi, rales.  Breathing unlabored.   	Chest wall: Normal insp   	Abdomen:  Soft, NT/ND, normal bowel sounds, no HSM, no masses.  No peritoneal signs.   	Back: tenderness in paraspinal musculature lower back  	: Exam normal.  no inguinal hernias.  	Extremities: No deformities, clubbing, cyanosis, or edema.  	Musculoskeletal: Normal gait and station. No decreased range of motion, instability, atrophy or abnormal strength or tone in the head, neck, spine, ribs, pelvis or extremities.   	Neurologic: CN 2-12 normal. Sensation to pain, touch and proprioception normal. DTRs normal in upper and lower extremities. No pathologic reflexes.  Motor normal.  Psychiatric: Oriented X3, intact recent and remote memory, judgement and insight, normal mood and affect.                            10.0   4.82  )-----------( 196      ( 01 Dec 2019 06:44 )             31.6     01 Dec 2019 06:44    143    |  112    |  17     ----------------------------<  109    4.2     |  26     |  0.64     Ca    8.5        01 Dec 2019 06:44        MEDICATIONS  (STANDING):  amLODIPine   Tablet 10 milliGRAM(s) Oral daily  apixaban 5 milliGRAM(s) Oral every 12 hours  aspirin  chewable 81 milliGRAM(s) Oral daily  atorvastatin 10 milliGRAM(s) Oral at bedtime  calcium carbonate    500 mG (Tums) Chewable 1 Tablet(s) Chew daily  cyanocobalamin 1000 MICROGram(s) Oral daily  cyclobenzaprine 10 milliGRAM(s) Oral three times a day  folic acid 1 milliGRAM(s) Oral daily  pregabalin 75 milliGRAM(s) Oral two times a day  ranitidine  Oral Tab/Cap - Peds 150 milliGRAM(s) Oral two times a day  sodium chloride 0.9%. 1000 milliLiter(s) (100 mL/Hr) IV Continuous <Continuous>  sulfaSALAzine 1000 milliGRAM(s) Oral two times a day    MEDICATIONS  (PRN):  acetaminophen   Tablet .. 1000 milliGRAM(s) Oral every 6 hours PRN Mild Pain (1 - 3)  aluminum hydroxide/magnesium hydroxide/simethicone Suspension 30 milliLiter(s) Oral every 4 hours PRN Dyspepsia  HYDROmorphone  Injectable 1 milliGRAM(s) IV Push every 4 hours PRN Severe Pain (7 - 10)  ondansetron Injectable 4 milliGRAM(s) IV Push every 6 hours PRN Nausea and/or Vomiting  oxyCODONE    IR 5 milliGRAM(s) Oral every 3 hours PRN Mild Pain (1 - 3)  oxyCODONE    IR 10 milliGRAM(s) Oral every 3 hours PRN Moderate Pain (4 - 6)  senna 2 Tablet(s) Oral at bedtime PRN Constipation        Assessment and Plan:   Assessment:  · Assessment		  74y old female with PMHx of CVA with residual unsteady gait, PAF on eliquis, HTN, HLD, RA, herniated discs, b/l TKR, and fatty liver disease who presents with a chief complaint of back pain, inability to walk which began friday morning after cooking all day thursday for thanksgiving.      #Intractable Back Pain:    Suspect more paraspinal muscle spasm related to standing/ cooking all day for thanksgiving.    No signs/ sx of radiculopathy.    Flexeril 10 TID.    Oxycodone/ Dilaudid PRN pain.    Spine eval-- Dr. Caldwell. appreciated, might need  surgery  CT lumbar spine noted-- suspect more chronic changes.  Patient admits to hx of herniated discs for years.  Use to be weight  in past.    PT eval.      #PAF:    Cont eliquis.    S/p PPM for sinus pauses.    Stable.      #Hx of CVA:    Minimal residual deficit with balance as per patient.      #HTN:    Cont home meds.      #HLD:    Cont home meds.      #RA:    Cont home meds.    Also is on monthly infusions with Dr. JENELLE Guadalupe.      #DVT Proph:    Eliquis.

## 2019-12-01 NOTE — CONSULT NOTE ADULT - SUBJECTIVE AND OBJECTIVE BOX
Cardiology Consultation    HPI: 74y old female with PMHx of CVA with residual unsteady gait, PAF on eliquis, HTN, HLD, RA, herniated discs, b/l TKR, and fatty liver disease who presents with a chief complaint of back pain, inability to walk.  Patient notes back pain started yesterday .  Patient was standing cooking all day for Thanksgiving on .  When she woke up friday morning, she could not get OOB.  She presented to  ER and was evaluated.  She had a CT showing multilevel herniated discs which she knew about in the past, was given muscle relaxers and discharged.  Pain has not improved.  It took her and her  30 min to get to the bathroom this morning from her bed.  She returns for intractable back pain.  Pain is in her lower back and worse on the left side.  Pain nearly resolves when she is lying flat on her back with a pillow under her legs.  Worse with any movement.  She denies any sx of radiculopathy-- no numbness or tingling or weakness in her legs.  Pain does not radiate into legs.  She has not eaten much due to pain in the last 48 hours and had an episode of vomiting earlier today.  In ER today, she was given valium/ dilaudid/ decadron with some improvement in pain.      . Pt seen/examined on 5E. +Back pain. No CP/SOB.   Not on tele.     PAST MEDICAL & SURGICAL HISTORY:  CVA (cerebral vascular accident)  Afib  Pancreatic lesion  Fatty liver  Unsteady gait: due to unstable Left knee  Dizziness  HTN (hypertension)  RA (rheumatoid arthritis)  H/O colonoscopy  H/O dilation and curettage: 2016  H/O hernia repair:   H/O total knee replacement, right: 10/13/2016  Failed total left knee replacement: 2016  H/O cataract extraction, right: 2015    FAMILY HISTORY:  Family history of heart disease-- mother  Family history of lung cancer-- father    Social History:  Lives at home with .  Exsmoker.  ~10 pack years in 20's.  No alcohol.  No drugs.      Allergies:  Methotrexate Sodium-- developed pneumonia after taking it (2019 09:57)    PAST MEDICAL & SURGICAL HISTORY:  CVA (cerebral vascular accident)  Afib  Pancreatic lesion  Fatty liver  Unsteady gait: due to unstable Left knee  Dizziness  HTN (hypertension)  RA (rheumatoid arthritis)  H/O colonoscopy  H/O dilation and curettage: 2016  H/O hernia repair:   H/O total knee replacement, right: 10/13/2016  Failed total left knee replacement: 2016  H/O cataract extraction, right: 2015    Allergies  Methotrexate Sodium (Unknown)    Intolerances    MEDICATIONS  (STANDING):  amLODIPine   Tablet 10 milliGRAM(s) Oral daily  apixaban 5 milliGRAM(s) Oral every 12 hours  aspirin  chewable 81 milliGRAM(s) Oral daily  atorvastatin 10 milliGRAM(s) Oral at bedtime  calcium carbonate    500 mG (Tums) Chewable 1 Tablet(s) Chew daily  cyanocobalamin 1000 MICROGram(s) Oral daily  cyclobenzaprine 10 milliGRAM(s) Oral three times a day  folic acid 1 milliGRAM(s) Oral daily  pregabalin 75 milliGRAM(s) Oral two times a day  ranitidine  Oral Tab/Cap - Peds 150 milliGRAM(s) Oral two times a day  sodium chloride 0.9%. 1000 milliLiter(s) (100 mL/Hr) IV Continuous <Continuous>  sulfaSALAzine 1000 milliGRAM(s) Oral two times a day    MEDICATIONS  (PRN):  acetaminophen   Tablet .. 1000 milliGRAM(s) Oral every 6 hours PRN Mild Pain (1 - 3)  aluminum hydroxide/magnesium hydroxide/simethicone Suspension 30 milliLiter(s) Oral every 4 hours PRN Dyspepsia  HYDROmorphone  Injectable 1 milliGRAM(s) IV Push every 4 hours PRN Severe Pain (7 - 10)  ondansetron Injectable 4 milliGRAM(s) IV Push every 6 hours PRN Nausea and/or Vomiting  oxyCODONE    IR 5 milliGRAM(s) Oral every 3 hours PRN Mild Pain (1 - 3)  oxyCODONE    IR 10 milliGRAM(s) Oral every 3 hours PRN Moderate Pain (4 - 6)  senna 2 Tablet(s) Oral at bedtime PRN Constipation      Vital Signs Last 24 Hrs  T(C): 36.3 (01 Dec 2019 05:13), Max: 36.9 (2019 09:55)  T(F): 97.4 (01 Dec 2019 05:13), Max: 98.5 (2019 09:55)  HR: 71 (01 Dec 2019 05:13) (71 - 86)  BP: 123/67 (01 Dec 2019 05:13) (123/67 - 147/74)  BP(mean): 88 (2019 09:55) (88 - 88)  RR: 18 (01 Dec 2019 05:13) (16 - 18)  SpO2: 96% (01 Dec 2019 05:13) (94% - 100%)    REVIEW OF SYSTEMS:    CONSTITUTIONAL:  As per HPI.  HEENT:  Eyes:  No diplopia or blurred vision. ENT:  No earache, sore throat or runny nose.  CARDIOVASCULAR:  No pressure, squeezing, strangling, tightness, heaviness or aching about the chest, neck, axilla or epigastrium.  RESPIRATORY:  No cough, shortness of breath, PND or orthopnea.  GASTROINTESTINAL:  No nausea, vomiting or diarrhea.  GENITOURINARY:  No dysuria, frequency or urgency.  MUSCULOSKELETAL:  As per HPI. +Back pain  SKIN:  No change in skin, hair or nails.  NEUROLOGIC:  No paresthesias, fasciculations, seizures or weakness.    PHYSICAL EXAMINATION:    GENERAL APPEARANCE:  Pt. is not currently dyspneic, in no distress. Pt. is alert, oriented, and pleasant.  HEENT:  Pupils are normal and react normally. No icterus. Mucous membranes well colored.  NECK:  Supple. No lymphadenopathy. Jugular venous pressure not elevated. Carotids equal.   HEART:   The cardiac impulse has a normal quality. There are no murmurs, rubs or gallops noted  CHEST:  Chest is clear to auscultation. Normal respiratory effort.  ABDOMEN:  Soft and nontender.   EXTREMITIES:  There is no edema.   SKIN:  No rash or significant lesions are noted.    I&O's Summary      LABS:                        10.0   4.82  )-----------( 196      ( 01 Dec 2019 06:44 )             31.6     12-    143  |  112<H>  |  17  ----------------------------<  109<H>  4.2   |  26  |  0.64    Ca    8.5      01 Dec 2019 06:44    TPro  7.3  /  Alb  3.7  /  TBili  0.6  /  DBili  x   /  AST  26  /  ALT  16  /  AlkPhos  138<H>      LIVER FUNCTIONS - ( 2019 13:05 )  Alb: 3.7 g/dL / Pro: 7.3 gm/dL / ALK PHOS: 138 U/L / ALT: 16 U/L / AST: 26 U/L / GGT: x           Urinalysis Basic - ( 2019 18:40 )    Color: Yellow / Appearance: Clear / S.020 / pH: x  Gluc: x / Ketone: Trace  / Bili: Negative / Urobili: Negative mg/dL   Blood: x / Protein: Negative mg/dL / Nitrite: Negative   Leuk Esterase: Negative / RBC: 0-2 /HPF / WBC Negative   Sq Epi: x / Non Sq Epi: Few / Bacteria: Occasional    EKG: < from: 12 Lead ECG (19 @ 07:58) >  Sinus rhythm with 1st degree A-V block  Left axis deviation  Inferior infarct (cited on or before 2017)  Possible Anterior infarct (cited on or before 05-AUG-2015)  Abnormal ECG    TELEMETRY: Not on tele    CARDIAC TESTS: < from: Transthoracic Echocardiogram (17 @ 08:58) >   The left ventricle is normal in size, wall motion and contractility.   Apical view suggests non-obstructive ANKUR.   Estimated left ventricular ejection fraction is 60-65 %.   Normal appearing left atrium.   Normalappearing right atrium.   Normal appearing right ventricle structure and function.   Fibrocalcific changes noted to the Aortic valve leaflets with preserved   leaflet excursion.   Normal appearing mitral valve structure and function.   Trace mitral regurgitation is present.   Mild mitral annular calcification is present.   Normal appearing tricuspid valve structure and function.   Trace pulmonic valvular regurgitation is present.   No evidence of pericardial effusion.   No evidence of pleural effusion.   IVC seems normal    RADIOLOGY & ADDITIONAL STUDIES: < from: CT Lumbar Spine No Cont (19 @ 13:31) >  IMPRESSION:   Grade 1 anterior spondylolisthesis at L4-5 on a   degenerative basis. Dextroscoliosis noted with apex at L3.  Multilevel   degenerative disc disease and spondylosis with disc bulges at L1-2   through L5-S1 which flatten the ventral thecal sac and narrow the   BILATERAL neural foramina. Mild centralstenosis at L2-3 and L3-4 and   L4-5 on a degenerative basis       < end of copied text >      ASSESSMENT & PLAN:

## 2019-12-02 ENCOUNTER — TRANSCRIPTION ENCOUNTER (OUTPATIENT)
Age: 74
End: 2019-12-02

## 2019-12-02 VITALS
OXYGEN SATURATION: 97 % | TEMPERATURE: 98 F | SYSTOLIC BLOOD PRESSURE: 119 MMHG | HEART RATE: 78 BPM | DIASTOLIC BLOOD PRESSURE: 79 MMHG | RESPIRATION RATE: 18 BRPM

## 2019-12-02 PROCEDURE — 72114 X-RAY EXAM L-S SPINE BENDING: CPT | Mod: 26

## 2019-12-02 RX ADMIN — APIXABAN 5 MILLIGRAM(S): 2.5 TABLET, FILM COATED ORAL at 05:00

## 2019-12-02 RX ADMIN — Medication 75 MILLIGRAM(S): at 05:02

## 2019-12-02 RX ADMIN — AMLODIPINE BESYLATE 10 MILLIGRAM(S): 2.5 TABLET ORAL at 05:00

## 2019-12-02 RX ADMIN — Medication 81 MILLIGRAM(S): at 11:29

## 2019-12-02 RX ADMIN — SODIUM CHLORIDE 100 MILLILITER(S): 9 INJECTION INTRAMUSCULAR; INTRAVENOUS; SUBCUTANEOUS at 03:10

## 2019-12-02 RX ADMIN — CYCLOBENZAPRINE HYDROCHLORIDE 10 MILLIGRAM(S): 10 TABLET, FILM COATED ORAL at 05:00

## 2019-12-02 RX ADMIN — Medication 1 TABLET(S): at 11:29

## 2019-12-02 RX ADMIN — Medication 1 MILLIGRAM(S): at 11:29

## 2019-12-02 RX ADMIN — CYCLOBENZAPRINE HYDROCHLORIDE 10 MILLIGRAM(S): 10 TABLET, FILM COATED ORAL at 13:29

## 2019-12-02 RX ADMIN — PREGABALIN 1000 MICROGRAM(S): 225 CAPSULE ORAL at 11:30

## 2019-12-02 RX ADMIN — Medication 1000 MILLIGRAM(S): at 05:00

## 2019-12-02 NOTE — PHYSICAL THERAPY INITIAL EVALUATION ADULT - MODALITIES TREATMENT COMMENTS
Pt returned supine in bed, +IV, +alarm, KARLA,Rn aware. Pt denies pain throughout session. +soft brace LS.

## 2019-12-02 NOTE — PROVIDER CONTACT NOTE (OTHER) - SITUATION
notified Dr Sexton's office of admission
Tele MD service spoke with Marvin to request routine consult.
Tele service spoke with Zoe to request PPM check and to see if MRI?

## 2019-12-02 NOTE — PHYSICAL THERAPY INITIAL EVALUATION ADULT - LIVES WITH, PROFILE
Pt lives w/ her  in a house w/ 1 BARON and no steps in the house to bath or bedroom. Pt states that her  works all day.

## 2019-12-02 NOTE — DISCHARGE NOTE NURSING/CASE MANAGEMENT/SOCIAL WORK - PATIENT PORTAL LINK FT
You can access the FollowMyHealth Patient Portal offered by [facility] by registering at the following website: [email or web address]. By joining [employer]’s FollowMyHealth portal, you will also be able to view your health information using other applications (apps) compatible with our system.

## 2019-12-02 NOTE — PHYSICAL THERAPY INITIAL EVALUATION ADULT - GENERAL OBSERVATIONS, REHAB EVAL
Pt was received on 5E, NAD, denies pain at this time, on pain meds, Wearing a lumbar soft brace from home, +IV

## 2019-12-02 NOTE — PHYSICAL THERAPY INITIAL EVALUATION ADULT - MUSCLE TONE ASSESSMENT, REHAB EVAL
Sensation +to LT B LE and symmetrical on R and L side.. Pt reports chronic numbness B feet on the plantar surface.

## 2019-12-02 NOTE — PHYSICAL THERAPY INITIAL EVALUATION ADULT - PERTINENT HX OF CURRENT PROBLEM, REHAB EVAL
Pt is a 73 y/o F,  presents with a cc of back pain, inability to walk.  Patient notes back pain started yesterday 11/29.  Patient was standing cooking all day for Thanksgiving on Thursday 11/28

## 2019-12-02 NOTE — PHYSICAL THERAPY INITIAL EVALUATION ADULT - IMPAIRMENTS CONTRIBUTING TO GAIT DEVIATIONS, PT EVAL
Pt able to walk 5 ft w/o AD but had Knee buckling and needs the RW at this time for safe, steady ambulation./decreased strength

## 2019-12-02 NOTE — DISCHARGE NOTE PROVIDER - CARE PROVIDER_API CALL
Manuel Sexton)  Medicine  [address] [place] [address]  Phone: [phone]  Fax: [phone]  Follow Up Time:     Reese Caldwell)  Orthopaedic Surgery  [address] [place] [address]  Phone: [phone]  Fax: [phone]  Follow Up Time: 2 weeks

## 2019-12-02 NOTE — PROGRESS NOTE ADULT - SUBJECTIVE AND OBJECTIVE BOX
HPI:  74y old female with PMHx of CVA with residual unsteady gait, PAF on eliquis, HTN, HLD, RA, herniated discs, b/l TKR, and fatty liver disease who presents with a chief complaint of back pain, inability to walk.  Patient notes back pain started yesterday 11/29.  Patient was standing cooking all day for Thanksgiving on Thursday 11/28.  When she woke up friday morning, she could not get OOB.  She presented to  ER and was evaluated.  She had a CT showing multilevel herniated discs which she knew about in the past, was given muscle relaxers and discharged.  Pain has not improved.  It took her and her  30 min to get to the bathroom this morning from her bed.  She returns for intractable back pain.  Pain is in her lower back and worse on the left side.  Pain nearly resolves when she is lying flat on her back with a pillow under her legs.  Worse with any movement.  She denies any sx of radiculopathy-- no numbness or tingling or weakness in her legs.  Pain does not radiate into legs.  She has not eaten much due to pain in the last 48 hours and had an episode of vomiting earlier today.  In ER, she was given valium/ dilaudid/ decadron with some improvement in pain.      12/2/19 Patient continues on Lyrica. Comfortable. PT evaluation this AM-able to walk comfortably    PAST MEDICAL & SURGICAL HISTORY:  CVA (cerebral vascular accident)  Afib  Pancreatic lesion  Fatty liver  Unsteady gait: due to unstable Left knee  Dizziness  HTN (hypertension)  RA (rheumatoid arthritis)  H/O colonoscopy  H/O dilation and curettage: 5/2016  H/O hernia repair: 1995  H/O total knee replacement, right: 10/13/2016  Failed total left knee replacement: 6/28/2016  H/O cataract extraction, right: 1/2015    FAMILY HISTORY:  Family history of heart disease-- mother  Family history of lung cancer-- father    Social History:  Lives at home with .  Exsmoker.  ~10 pack years in 20's.  No alcohol.  No drugs.      Allergies:  Methotrexate Sodium-- developed pneumonia after taking it (30 Nov 2019 09:57)    PE:  Vital Signs Last 24 Hrs  T(C): 36.3 (02 Dec 2019 11:07), Max: 36.6 (01 Dec 2019 20:33)  T(F): 97.3 (02 Dec 2019 11:07), Max: 97.8 (01 Dec 2019 20:33)  HR: 81 (02 Dec 2019 05:23) (71 - 81)  BP: 123/60 (02 Dec 2019 11:07) (120/71 - 154/93)  BP(mean): --  RR: 16 (02 Dec 2019 11:07) (16 - 18)  SpO2: 95% (02 Dec 2019 11:07) (95% - 97%)    Patient comfortable in bed, ambulated with PT  Voiding adequately  Mild tenderness lumbar spine  Negative SLR bilaterally  No focal motor deficits B/L LEs    STUDIES:  CT LS spine  Grade 1 anterior spondylolisthesis at L4-5 on a   degenerative basis. Dextroscoliosis noted with apex at L3.  Multilevel   degenerative disc disease and spondylosis with disc bulges at L1-2   through L5-S1 which flatten the ventral thecal sac and narrow the   BILATERAL neural foramina. Mild central stenosis at L2-3 and L3-4 and   L4-5 on a degenerative basis

## 2019-12-02 NOTE — DISCHARGE NOTE PROVIDER - HOSPITAL COURSE
74y old female with PMHx of CVA with residual unsteady gait, PAF on eliquis, HTN, HLD, RA, herniated discs, b/l TKR, and fatty liver disease who presents with a chief complaint of back pain, inability to walk.  Patient notes back pain started yesterday 11/29.  Patient was standing cooking all day for Thanksgiving on Thursday 11/28.  When she woke up friday morning, she could not get OOB.  She presented to  ER and was evaluated.  She had a CT showing multilevel herniated discs which she knew about in the past, was given muscle relaxers and discharged.  Pain has not improved.  It took her and her  30 min to get to the bathroom this morning from her bed.  She returns for intractable back pain.  Pain is in her lower back and worse on the left side.  Pain nearly resolves when she is lying flat on her back with a pillow under her legs.  Worse with any movement.  She denies any sx of radiculopathy-- no numbness or tingling or weakness in her legs.  Pain does not radiate into legs.  She has not eaten much due to pain in the last 48 hours and had an episode of vomiting earlier today.  In ER today, she was given valium/ dilaudid/ decadron with some improvement in pain.             12/01/19: Patient seen and examined. Back pain improving.

## 2019-12-02 NOTE — DISCHARGE NOTE PROVIDER - NSDCCPCAREPLAN_GEN_ALL_CORE_FT
PRINCIPAL DISCHARGE DIAGNOSIS  Diagnosis: Back pain  Assessment and Plan of Treatment: Follow up with Dr Caldwell

## 2019-12-02 NOTE — PROGRESS NOTE ADULT - ASSESSMENT
74y old female with PMHx of CVA with residual unsteady gait, PAF on eliquis, HTN, HLD, RA, herniated discs, b/l TKR, and fatty liver disease who presents with a chief complaint of back pain, inability to walk.  Patient notes back pain started yesterday 11/29.  Patient was standing cooking all day for Thanksgiving on Thursday 11/28.  When she woke up friday morning, she could not get OOB.  She presented to  ER and was evaluated.  She had a CT showing multilevel herniated discs which she knew about in the past, was given muscle relaxers and discharged.  Pain has not improved.  It took her and her  30 min to get to the bathroom this morning from her bed.  She returns for intractable back pain.  Pain is in her lower back and worse on the left side.  Pain nearly resolves when she is lying flat on her back with a pillow under her legs.  Worse with any movement.  She denies any sx of radiculopathy-- no numbness or tingling or weakness in her legs.  Pain does not radiate into legs.  She has not eaten much due to pain in the last 48 hours and had an episode of vomiting earlier today.  In ER, she was given valium/ dilaudid/ decadron with some improvement in pain.      PLAN:  Increase activity as tolerated  Stable for D/C home from Spine  Continue O/P PT  F/U Dr. Caldwell in 2 weeks at [phone]

## 2019-12-02 NOTE — CHART NOTE - NSCHARTNOTEFT_GEN_A_CORE
DISCHARGE SUMMARY  To be completed within 30 days of last clinical contact      Rachael Vaca : 1965 MRN: [id number]      Date of Intake: 18 Date of Last Session: 6/3/2019    Chief Complaint:\"My step-son committed suicide.\"    Admission Diagnosis:Adjustment Disorder with mixed emotional featues    D/C Diagnosis Codes: F43.29 Adjustment disorder with mixed emotional features  (primary encounter diagnosis)    The above named patient was discharged from my care on 6/3/2019 for the following reason(s): Patient Has Completed Treatment (mutual agreement)    Treatment Provided: (check all that apply) Individual    Summary of Patient Progress Toward Goals in the Treatment Plan: Patient presented to therapy for grief. Patient's son-in-law committed suicide which also triggered emotions related to patient's brother passing away earlier in the year. Worked on acceptance and utilizing mindfulness to bring patient's mind back to the present moment. Also utilized Cognitive Behavioral Therapy to challenge distorted thinking patterns and encouraged self-care activities as patient struggled with people pleasing behaviors. Trauma from patient's past with her parents presented itself and we discussed creating healthy boundaries to keep patient feeling safe. Patient was very motivated during her treatment and followed through with treatment expectations. She utilized her recovery supports as needed and continued attending AA meetings.     Current Outpatient Medications   Medication Sig Dispense Refill   • zaleplon (SONATA) 10 MG capsule TAKE ONE CAPSULE BY MOUTH AT BEDTIME 90 capsule 1   • citalopram (CELEXA) 40 MG tablet Take 1 tablet by mouth daily. 90 tablet 1   • albuterol 108 (90 Base) MCG/ACT inhaler Inhale 2 puffs into the lungs every 4 hours as needed for Shortness of Breath or Wheezing. 8.5 g 0   • GuaiFENesin (MUCINEX PO)      • Pseudoephedrine HCl (SUDAFED PO)      • ibuprofen (MOTRIN) 800 MG tablet Take 1  Patient has Dual Chamber Medtronic PPM in place.  Device and leads are MRI compatible, will determine if pacing thresholds are adequate for MRI prior to test.  Requested recent interrogation from patient's following cardiologist. tablet by mouth every 8 hours as needed for Pain. 60 tablet 0   • FIBER PO Take by mouth daily.     • Cholecalciferol (VITAMIN D3) 5000 UNITS Tab Take 1 tablet by mouth daily.       No current facility-administered medications for this visit.        Discharge Recommendations: (Include names and addresses of facilities, persons or programs the patient was referred to following discharge.) Encouarged to return to treatment as needed.     Remaining Discharge Needs: (Include treatment issues not addressed.) N/A      Leia Eli LCSW Date: 6/3/2019   Time: 10:32 [facility], [address]  [address]  [place] [address]  [phone]      Rachael Vaca :1965 MRN:[id number]    6/3/2019 Time Session Began: 9:30am  Time Session Ended: 10:15am    Session Type:45 Minute Therapy (28744)    Others Present: N/A    Intervention: Supportive    Suicide/Homicide/Violence Ideation: No    Current Outpatient Medications   Medication Sig   • zaleplon (SONATA) 10 MG capsule TAKE ONE CAPSULE BY MOUTH AT BEDTIME   • citalopram (CELEXA) 40 MG tablet Take 1 tablet by mouth daily.   • albuterol 108 (90 Base) MCG/ACT inhaler Inhale 2 puffs into the lungs every 4 hours as needed for Shortness of Breath or Wheezing.   • GuaiFENesin (MUCINEX PO)    • Pseudoephedrine HCl (SUDAFED PO)    • ibuprofen (MOTRIN) 800 MG tablet Take 1 tablet by mouth every 8 hours as needed for Pain.   • FIBER PO Take by mouth daily.   • Cholecalciferol (VITAMIN D3) 5000 UNITS Tab Take 1 tablet by mouth daily.     No current facility-administered medications for this visit.        Change in Medication(s) Reported: No      Patient/Family Education Provided: N/A  Patient/Family Displays Understanding: N/A    Chief complaint in patient's own words: \"Grieve as hard as I love.\"    Progress Note containing chief complaint and symptoms/problems related to the  complaint:    (Data/Action/Response/Plan)    D-Patient presented to an individual psychotherapy session for grief.  Since our last session, patient reports that she got through the anniversary of her brother's passing. Patient states that she had thoughts of wanting to drink and therefore reached out to her sponsor and attended a meeting. Patient has gained acceptance of where she is at and the grief process and realizes that she is grieving hard at times due to the love that she had for her brother. Patient's symptoms are stable at this time. Patient will be discharged from treatment today.    A-Patient presented as motivated for treatment. Provider utilized empathetic listening and support for the patient’s situation.  Patient’s treatment goals were reviewed and patient is in agreement with their plan of care.     R-Patient was alert and oriented x4. Patient presented with euthymic mood congruent with affect and situation. Eye contact was maintained. Speech was normal in rate, tone, and volume. Motor activity was unremarkable. Thought process was future oriented and goal directed. Patient denies suicidal or homicidal ideation.     P-Patient will be successfully discharged from treatment today.  Reviewed 24-hour emergency access information.     Session:3/6    Need for Community Resources Assessed: Yes    Resources Needed: No    Diagnosis: Adjustment Disorder with Mixed Mood and Anxiety Features (related to grief) (F43.23)    Treatment Plan: Unchanged    Discharge Plan: Strategies Discussed to Maintain Gains    Next Appointment: N/A  Leia Eli LCSW

## 2019-12-02 NOTE — DISCHARGE NOTE PROVIDER - NSDCMRMEDTOKEN_GEN_ALL_CORE_FT
amLODIPine 10 mg oral tablet: 1 tab(s) orally once a day  aspirin 81 mg oral tablet, chewable: 1 tab(s) orally once a day  atorvastatin 10 mg oral tablet: 1 tab(s) orally once a day  calcium carbonate 500 mg (200 mg elemental calcium) oral tablet, chewable: 1 tab(s) orally once a day  cyanocobalamin 1000 mcg oral tablet: 1 tab(s) orally once a day  Eliquis 5 mg oral tablet: 1 tab(s) orally 2 times a day  folic acid 1 mg oral tablet: 1 tab(s) orally once a day  oxycodone-acetaminophen 5 mg-325 mg oral tablet: 1 tab(s) orally every 6 hours, As needed, Moderate Pain (4 - 6) MDD:20mg  raNITIdine 150 mg oral tablet: 1 tab(s) orally 2 times a day  sulfaSALAzine 500 mg oral tablet: 2 tab(s) orally 2 times a day  Tylenol Extra Strength 500 mg oral tablet: 2 tab(s) orally every 8 hours   Ventolin HFA 90 mcg/inh inhalation aerosol: 2 puff(s) inhaled 4 times a day, As Needed  Vitamin D3 2000 intl units oral capsule: 1 cap(s) orally once a day  Voltaren 1% topical gel: Apply topically to affected area once a day. Applies after shower

## 2019-12-05 DIAGNOSIS — I10 ESSENTIAL (PRIMARY) HYPERTENSION: ICD-10-CM

## 2019-12-05 DIAGNOSIS — Z79.82 LONG TERM (CURRENT) USE OF ASPIRIN: ICD-10-CM

## 2019-12-05 DIAGNOSIS — M54.5 LOW BACK PAIN: ICD-10-CM

## 2019-12-05 DIAGNOSIS — Z79.01 LONG TERM (CURRENT) USE OF ANTICOAGULANTS: ICD-10-CM

## 2019-12-05 DIAGNOSIS — Z86.73 PERSONAL HISTORY OF TRANSIENT ISCHEMIC ATTACK (TIA), AND CEREBRAL INFARCTION WITHOUT RESIDUAL DEFICITS: ICD-10-CM

## 2019-12-05 DIAGNOSIS — Z96.653 PRESENCE OF ARTIFICIAL KNEE JOINT, BILATERAL: ICD-10-CM

## 2019-12-05 DIAGNOSIS — I69.993 ATAXIA FOLLOWING UNSPECIFIED CEREBROVASCULAR DISEASE: ICD-10-CM

## 2019-12-05 DIAGNOSIS — M06.9 RHEUMATOID ARTHRITIS, UNSPECIFIED: ICD-10-CM

## 2019-12-05 DIAGNOSIS — I48.0 PAROXYSMAL ATRIAL FIBRILLATION: ICD-10-CM

## 2019-12-05 DIAGNOSIS — Z95.0 PRESENCE OF CARDIAC PACEMAKER: ICD-10-CM

## 2019-12-05 DIAGNOSIS — K76.0 FATTY (CHANGE OF) LIVER, NOT ELSEWHERE CLASSIFIED: ICD-10-CM

## 2019-12-27 RX ADMIN — ABATACEPT 0 MG: 250 INJECTION, POWDER, LYOPHILIZED, FOR SOLUTION INTRAVENOUS at 00:00

## 2020-01-01 NOTE — PATIENT PROFILE ADULT - HAVE YOU EXPERIENCED VIOLENCE OR A TRAUMATIC EVENT?
This note was copied from the mother's chart.     04/14/20 1420   Maternal Assessment   Breast Shape Bilateral:;round   Breast Density Bilateral:;soft   Areola Bilateral:;elastic   Nipples Bilateral:;everted   Maternal Infant Feeding   Maternal Emotional State independent;relaxed   Infant Positioning cross-cradle   Signs of Milk Transfer audible swallow;infant jaw motion present   Pain with Feeding no   Latch Assistance yes   Lactation Referrals   Lactation Referrals other (see comments)  (lactation warmline)   Discharge lactation education reviewed with breastfeeding guide. Mom independently latching baby, baby nurses well. Pt has lactation warmline for support.    no

## 2020-01-03 NOTE — DISCHARGE NOTE NURSING/CASE MANAGEMENT/SOCIAL WORK - NSFLUVACAGEDISCH_IMM_ALL_CORE
Aftercare Plan:         Your scheduled appointments are listed below.      To cancel an appointment, please call the program at least (2) hours before. If outside of business hours, you may leave a message.     If you have an unexcused absence for your first scheduled program appointment, your future appointments will be cancelled to permit the next person on the waiting list to be scheduled.  You may need to wait until another opening occurs, which may delay your treatment. Contact Central Scheduling at [phone] to request an appointment to be reassessed for future services with [facility].        [facility] Intensive Outpatient [facility] (IOP)   Start on Monday, December 6th  Please arrive at 5:00pm to register.    Evening IOP meets Monday-Thursday from 5:30pm-8:30pm[place] [address]  [phone]    [facility] Central Scheduling [phone]    This patient is referred to the [facility] PHP, IOP, RTC, [facility], OP Program, OP Program or [facility] OP Provider. Providers in these programs have access to the EMR.     Travon Barron MD     Medication Management     Appt:  [facility]  [address] [place] [address]  Phone: [phone] ext. [phone]  Fax: [phone]    Sharad Edwards RN,     ______________________________________________________________    
Adult

## 2020-01-09 ENCOUNTER — APPOINTMENT (OUTPATIENT)
Dept: RHEUMATOLOGY | Facility: CLINIC | Age: 75
End: 2020-01-09

## 2020-01-13 ENCOUNTER — APPOINTMENT (OUTPATIENT)
Dept: RHEUMATOLOGY | Facility: CLINIC | Age: 75
End: 2020-01-13

## 2020-01-13 ENCOUNTER — APPOINTMENT (OUTPATIENT)
Dept: RHEUMATOLOGY | Facility: CLINIC | Age: 75
End: 2020-01-13
Payer: MEDICARE

## 2020-01-13 VITALS
DIASTOLIC BLOOD PRESSURE: 68 MMHG | HEART RATE: 74 BPM | SYSTOLIC BLOOD PRESSURE: 128 MMHG | OXYGEN SATURATION: 100 % | RESPIRATION RATE: 14 BRPM

## 2020-01-13 VITALS
SYSTOLIC BLOOD PRESSURE: 158 MMHG | OXYGEN SATURATION: 97 % | RESPIRATION RATE: 16 BRPM | DIASTOLIC BLOOD PRESSURE: 79 MMHG | HEART RATE: 77 BPM | TEMPERATURE: 97.6 F

## 2020-01-13 PROCEDURE — 96413 CHEMO IV INFUSION 1 HR: CPT

## 2020-01-13 RX ORDER — ABATACEPT 250 MG/15ML
250 INJECTION, POWDER, LYOPHILIZED, FOR SOLUTION INTRAVENOUS
Qty: 0 | Refills: 0 | Status: COMPLETED | OUTPATIENT
Start: 2019-12-27

## 2020-01-24 RX ADMIN — ABATACEPT 0 MG: 250 INJECTION, POWDER, LYOPHILIZED, FOR SOLUTION INTRAVENOUS at 00:00

## 2020-01-29 RX ORDER — ABATACEPT 250 MG/15ML
250 INJECTION, POWDER, LYOPHILIZED, FOR SOLUTION INTRAVENOUS
Qty: 0 | Refills: 0 | Status: DISCONTINUED | OUTPATIENT
Start: 2019-08-15 | End: 2020-01-29

## 2020-02-09 NOTE — PATIENT PROFILE ADULT - FUNCTIONAL SCREEN CURRENT LEVEL: EATING, MLM
[facility]  [address]  [facility] [address]  Phone:  [phone]                                    Danielle Wheat   MRN: [id number]    Department:  [facility]   Date of Visit:  2/9/2020           After Visit Summary Signature Page    I have received my discharge instructions, and my questions have been answered. I have discussed any challenges I see with this plan with the nurse or doctor.    ..........................................................................................................................................  Patient/Patient Representative Signature      ..........................................................................................................................................  Patient Representative Print Name and Relationship to Patient    ..................................................               ................................................  Date                                   Time    ..........................................................................................................................................  Reviewed by Signature/Title    ...................................................              ..............................................  Date                                               Time          22EPIC Rev 08/18        0 = independent

## 2020-02-10 ENCOUNTER — APPOINTMENT (OUTPATIENT)
Dept: RHEUMATOLOGY | Facility: CLINIC | Age: 75
End: 2020-02-10

## 2020-03-16 ENCOUNTER — APPOINTMENT (OUTPATIENT)
Dept: RHEUMATOLOGY | Facility: CLINIC | Age: 75
End: 2020-03-16
Payer: MEDICARE

## 2020-03-16 VITALS
DIASTOLIC BLOOD PRESSURE: 88 MMHG | SYSTOLIC BLOOD PRESSURE: 122 MMHG | OXYGEN SATURATION: 94 % | BODY MASS INDEX: 22.43 KG/M2 | WEIGHT: 148 LBS | TEMPERATURE: 98.1 F | HEART RATE: 113 BPM | HEIGHT: 68 IN

## 2020-03-16 PROCEDURE — 99214 OFFICE O/P EST MOD 30 MIN: CPT | Mod: 25

## 2020-03-16 PROCEDURE — 96372 THER/PROPH/DIAG INJ SC/IM: CPT

## 2020-03-16 RX ORDER — AMOXICILLIN AND CLAVULANATE POTASSIUM 875; 125 MG/1; MG/1
875-125 TABLET, COATED ORAL
Qty: 20 | Refills: 0 | Status: DISCONTINUED | COMMUNITY
Start: 2019-11-11

## 2020-03-16 RX ORDER — FLUTICASONE PROPIONATE 50 UG/1
50 SPRAY, METERED NASAL
Qty: 16 | Refills: 0 | Status: DISCONTINUED | COMMUNITY
Start: 2019-11-14

## 2020-03-16 RX ORDER — BUDESONIDE AND FORMOTEROL FUMARATE DIHYDRATE 160; 4.5 UG/1; UG/1
160-4.5 AEROSOL RESPIRATORY (INHALATION)
Qty: 10 | Refills: 0 | Status: DISCONTINUED | COMMUNITY
Start: 2019-11-14

## 2020-03-16 RX ORDER — PREDNISONE 10 MG/1
10 TABLET ORAL
Qty: 10 | Refills: 0 | Status: DISCONTINUED | COMMUNITY
Start: 2019-11-14

## 2020-03-17 LAB
ALBUMIN SERPL ELPH-MCNC: 4.3 G/DL
ALP BLD-CCNC: 151 U/L
ALT SERPL-CCNC: 12 U/L
ANION GAP SERPL CALC-SCNC: 15 MMOL/L
AST SERPL-CCNC: 18 U/L
BASOPHILS # BLD AUTO: 0.02 K/UL
BASOPHILS NFR BLD AUTO: 0.3 %
BILIRUB SERPL-MCNC: 0.8 MG/DL
BUN SERPL-MCNC: 12 MG/DL
CALCIUM SERPL-MCNC: 9.4 MG/DL
CHLORIDE SERPL-SCNC: 100 MMOL/L
CO2 SERPL-SCNC: 25 MMOL/L
CREAT SERPL-MCNC: 0.89 MG/DL
CRP SERPL-MCNC: 9.28 MG/DL
EOSINOPHIL # BLD AUTO: 0.02 K/UL
EOSINOPHIL NFR BLD AUTO: 0.3 %
ERYTHROCYTE [SEDIMENTATION RATE] IN BLOOD BY WESTERGREN METHOD: 29 MM/HR
GLUCOSE SERPL-MCNC: 135 MG/DL
HCT VFR BLD CALC: 38 %
HGB BLD-MCNC: 11.7 G/DL
IMM GRANULOCYTES NFR BLD AUTO: 0.3 %
LYMPHOCYTES # BLD AUTO: 1.43 K/UL
LYMPHOCYTES NFR BLD AUTO: 19.2 %
MAN DIFF?: NORMAL
MCHC RBC-ENTMCNC: 26.9 PG
MCHC RBC-ENTMCNC: 30.8 GM/DL
MCV RBC AUTO: 87.4 FL
MONOCYTES # BLD AUTO: 0.94 K/UL
MONOCYTES NFR BLD AUTO: 12.6 %
NEUTROPHILS # BLD AUTO: 5.03 K/UL
NEUTROPHILS NFR BLD AUTO: 67.3 %
PLATELET # BLD AUTO: 240 K/UL
POTASSIUM SERPL-SCNC: 3.9 MMOL/L
PROT SERPL-MCNC: 6.8 G/DL
RBC # BLD: 4.35 M/UL
RBC # FLD: 15.5 %
SODIUM SERPL-SCNC: 140 MMOL/L
WBC # FLD AUTO: 7.46 K/UL

## 2020-03-17 NOTE — REVIEW OF SYSTEMS
[Fever] : no fever [Chills] : no chills [Feeling Poorly] : not feeling poorly [Feeling Tired] : feeling tired [Eye Pain] : no eye pain [Dry Eyes] : no dryness of the eyes [Loss Of Hearing] : no hearing loss [Chest Pain] : no chest pain [Lower Ext Edema] : no lower extremity edema [Shortness Of Breath] : no shortness of breath [Cough] : no cough [SOB on Exertion] : no shortness of breath during exertion [Abdominal Pain] : no abdominal pain [Dysuria] : no dysuria [Arthralgias] : arthralgias [Joint Pain] : joint pain [Joint Swelling] : no joint swelling [Joint Stiffness] : no joint stiffness [Skin Lesions] : no skin lesions [Dizziness] : no dizziness [Fainting] : no fainting [Limb Weakness] : no limb weakness [Difficulty Walking] : difficulty walking [Anxiety] : no anxiety [Depression] : no depression [Muscle Weakness] : no muscle weakness [Easy Bruising] : no tendency for easy bruising

## 2020-03-17 NOTE — PHYSICAL EXAM
[General Appearance - Alert] : alert [General Appearance - Well Nourished] : well nourished [General Appearance - Well Developed] : well developed [Sclera] : the sclera and conjunctiva were normal [Oropharynx] : the oropharynx was normal [Neck Appearance] : the appearance of the neck was normal [Respiration, Rhythm And Depth] : normal respiratory rhythm and effort [Heart Sounds] : normal S1 and S2 [Full Pulse] : the pedal pulses are present [Edema] : there was no peripheral edema [Abdomen Tenderness] : non-tender [Cervical Lymph Nodes Enlarged Anterior Bilaterally] : anterior cervical [Supraclavicular Lymph Nodes Enlarged Bilaterally] : supraclavicular [No CVA Tenderness] : no ~M costovertebral angle tenderness [No Spinal Tenderness] : no spinal tenderness [Abnormal Walk] : normal gait [Nail Clubbing] : no clubbing  or cyanosis of the fingernails [Motor Tone] : muscle strength and tone were normal [FreeTextEntry1] : antalgic gait, MCB with swelling bilaterally, bony changes hands, knees b/l TKR, hips and ankles and shoulders FROM, Pain on Motion all joints [] : no rash [Deep Tendon Reflexes (DTR)] : deep tendon reflexes were 2+ and symmetric [Motor Exam] : the motor exam was normal [Oriented To Time, Place, And Person] : oriented to person, place, and time [Impaired Insight] : insight and judgment were intact [Affect] : the affect was normal

## 2020-03-17 NOTE — ASSESSMENT
[FreeTextEntry1] : 74-year-old  female with multiple medical problems including hypertension, osteoarthritis status post bilateral knee replacement, left hemiparesis, on anticoagulation, pelvic fracture, and rheumatoid arthritis.\par \par Rheumatoid arthritis-\par based on her history she was diagnosed about 3 years ago based on joint pains and abnormal blood work.  She was steroid dependent and was taken off the steroids once she had the pelvic fracture. \par  Prior to seeing me she has tried Plaquenil. With me she initially tried cimzia followed by xeljanz along with MTX. A few months ago she was hospitalized with pneumonia, at that time it was unclear whether she had pneumonia versus COPD versus interstitial lung disease or side effect of methotrexate. She continues to stay off both the TNF inhibitors and the methotrexate.\par \par She is currently on sulfasalazine 2 g daily. She Was on Orencia and doing well but stopped it due to GI side effects.\par -She is having a polyarticular flare today.\par -This may not be the best time to start a new medication.\par -To receive intramuscular Depo-Medrol 80 mg x1\par -To start prednisone at 5 mg daily\par \par Nonspecific interstitial pneumonitis-\par She was admitted to [facility] in December 2018 with pneumonia, she also has superimposed NSIP most likely related to the rheumatoid arthritis. As per pulmonologist, it is unclear if she had to toxicity from methotrexate but we discontinued it.\par \par Her last CT chest as per pulmonary is much better\par \par Immunosupresant use-\par she is aware to call if unwell\par \par \par Steroid use-\par Risks and benefits of steroids were d/w patient including but not limited to weight gain, diabetes, HTN, avascular necrosis, osteoporosis, glaucoma, cataract, infections and immunosuppression.\par \par f/u 6 weeks

## 2020-03-17 NOTE — PROCEDURE
[Other Date:___] : Date: [unfilled] [Patient] : the patient [Risks] : risks [Benefits] : benefits [Alternatives] : alternatives [Consent Obtained] : written consent was obtained prior to the procedure and is detailed in the patient's record [Therapeutic] : therapeutic [#1 Site: ______] : #1 site identified in the [unfilled] [Ethyl Chloride] : ethyl chloride [Betadine] : betadine solution [Alcohol] : alcohol [Chlorhexidine] : chlorhexidine [25 gauge 1.5 inch] : A 25 gauge 1.5 inch needle was used [Depomedrol ___ mg] : Depomedrol [unfilled] mg [Tolerated Well] : the patient tolerated the procedure well [No Complications] : there were no complications [Instructions Given] : handouts/patient instructions were given to patient [Patient Instructed to Call] : patient was instructed to call if redness at site, a decrease in range of motion or an increase in pain is noted after procedure.

## 2020-03-17 NOTE — HISTORY OF PRESENT ILLNESS
[FreeTextEntry1] : Followup visit rheumatoid arthritis\par She was receiving orencia infusions in our office but stopped because they made her nauseous, she had severe GERD symptoms. She states that she had an endoscopy and was told she has an ulcer. She does not want to use it again. In the interval her joint pain is severe, she rates it as a 10 out of 10. She is unable to do much. She has deferred her plans to go to [place] at this time. She admits to morning stiffness which is 90 minutes.\par \par She wants to know what she can use. She has not seen pulmonary. She has not seen any other physicians. She admits to fatigue as well.\par \par She denies any fevers or chills or night sweats.

## 2020-03-23 ENCOUNTER — APPOINTMENT (OUTPATIENT)
Dept: RHEUMATOLOGY | Facility: CLINIC | Age: 75
End: 2020-03-23

## 2020-04-13 ENCOUNTER — APPOINTMENT (OUTPATIENT)
Dept: RHEUMATOLOGY | Facility: CLINIC | Age: 75
End: 2020-04-13
Payer: MEDICARE

## 2020-04-13 PROCEDURE — 99442: CPT

## 2020-05-09 ENCOUNTER — RX RENEWAL (OUTPATIENT)
Age: 75
End: 2020-05-09

## 2020-05-18 LAB
ALBUMIN SERPL ELPH-MCNC: 4.2 G/DL
ALP BLD-CCNC: 110 U/L
ALT SERPL-CCNC: 14 U/L
ANION GAP SERPL CALC-SCNC: 13 MMOL/L
AST SERPL-CCNC: 19 U/L
BASOPHILS # BLD AUTO: 0.04 K/UL
BASOPHILS NFR BLD AUTO: 0.5 %
BILIRUB SERPL-MCNC: 0.4 MG/DL
BUN SERPL-MCNC: 17 MG/DL
CALCIUM SERPL-MCNC: 9.5 MG/DL
CHLORIDE SERPL-SCNC: 103 MMOL/L
CO2 SERPL-SCNC: 28 MMOL/L
CREAT SERPL-MCNC: 0.86 MG/DL
CRP SERPL-MCNC: 0.18 MG/DL
EOSINOPHIL # BLD AUTO: 0.01 K/UL
EOSINOPHIL NFR BLD AUTO: 0.1 %
ERYTHROCYTE [SEDIMENTATION RATE] IN BLOOD BY WESTERGREN METHOD: 10 MM/HR
GLUCOSE SERPL-MCNC: 82 MG/DL
HCT VFR BLD CALC: 40.6 %
HGB BLD-MCNC: 12.4 G/DL
IMM GRANULOCYTES NFR BLD AUTO: 0.9 %
LYMPHOCYTES # BLD AUTO: 2.2 K/UL
LYMPHOCYTES NFR BLD AUTO: 27.7 %
MAN DIFF?: NORMAL
MCHC RBC-ENTMCNC: 27 PG
MCHC RBC-ENTMCNC: 30.5 GM/DL
MCV RBC AUTO: 88.3 FL
MONOCYTES # BLD AUTO: 0.85 K/UL
MONOCYTES NFR BLD AUTO: 10.7 %
NEUTROPHILS # BLD AUTO: 4.77 K/UL
NEUTROPHILS NFR BLD AUTO: 60.1 %
PLATELET # BLD AUTO: 206 K/UL
POTASSIUM SERPL-SCNC: 3.8 MMOL/L
PROT SERPL-MCNC: 6.7 G/DL
RBC # BLD: 4.6 M/UL
RBC # FLD: 15.9 %
SODIUM SERPL-SCNC: 144 MMOL/L
WBC # FLD AUTO: 7.94 K/UL

## 2020-05-26 ENCOUNTER — APPOINTMENT (OUTPATIENT)
Dept: NEUROLOGY | Facility: CLINIC | Age: 75
End: 2020-05-26

## 2020-06-03 ENCOUNTER — APPOINTMENT (OUTPATIENT)
Dept: RHEUMATOLOGY | Facility: CLINIC | Age: 75
End: 2020-06-03
Payer: MEDICARE

## 2020-06-03 VITALS
SYSTOLIC BLOOD PRESSURE: 140 MMHG | HEIGHT: 68 IN | BODY MASS INDEX: 24.1 KG/M2 | WEIGHT: 159 LBS | HEART RATE: 87 BPM | OXYGEN SATURATION: 96 % | TEMPERATURE: 98.6 F | DIASTOLIC BLOOD PRESSURE: 80 MMHG

## 2020-06-03 DIAGNOSIS — J84.89 OTHER SPECIFIED INTERSTITIAL PULMONARY DISEASES: ICD-10-CM

## 2020-06-03 PROCEDURE — 36415 COLL VENOUS BLD VENIPUNCTURE: CPT

## 2020-06-03 PROCEDURE — 99214 OFFICE O/P EST MOD 30 MIN: CPT | Mod: 25

## 2020-06-03 NOTE — ASSESSMENT
[FreeTextEntry1] : 74-year-old  female with multiple medical problems including hypertension, osteoarthritis status post bilateral knee replacement, left hemiparesis, on anticoagulation, pelvic fracture, and rheumatoid arthritis.\par \par Rheumatoid arthritis-\par based on her history she was diagnosed about 3 years ago based on joint pains and abnormal blood work.  She was steroid dependent and was taken off the steroids once she had the pelvic fracture. \par  Prior to seeing me she has tried Plaquenil. With me she initially tried cimzia followed by xeljanz along with MTX. A few months ago she was hospitalized with pneumonia, at that time it was unclear whether she had pneumonia versus COPD versus interstitial lung disease or side effect of methotrexate. She continues to stay off both the TNF inhibitors and the methotrexate.\par \par She is currently on sulfasalazine 2 g daily. She Was on Orencia and doing well but stopped it due to GI side effects.\par -she is currently on prednisone 15 mg and sx\par - will consider actemra use\par Risks and benefits were d/w patient including but not limited to immunosuppression, reactivation of TB, lymphoma, malignancies, GI perforation, elevation of lipid panel , and infections.\par \par \par Nonspecific interstitial pneumonitis-\par She was admitted to [facility] in December 2018 with pneumonia, she also has superimposed NSIP most likely related to the rheumatoid arthritis. As per pulmonologist, it is unclear if she had to toxicity from methotrexate but we discontinued it.\par \par Her last CT chest as per pulmonary is much better, will repeat CT chest at this time\par \par Immunosupresant use-\par she is aware to call if unwell\par \par \par Steroid use-\par Risks and benefits of steroids were d/w patient including but not limited to weight gain, diabetes, HTN, avascular necrosis, osteoporosis, glaucoma, cataract, infections and immunosuppression.\par \par f/u 6 weeks

## 2020-06-03 NOTE — PHYSICAL EXAM
[General Appearance - Alert] : alert [General Appearance - Well Nourished] : well nourished [General Appearance - Well Developed] : well developed [Oropharynx] : the oropharynx was normal [Sclera] : the sclera and conjunctiva were normal [Neck Appearance] : the appearance of the neck was normal [Respiration, Rhythm And Depth] : normal respiratory rhythm and effort [Heart Sounds] : normal S1 and S2 [Edema] : there was no peripheral edema [Full Pulse] : the pedal pulses are present [Cervical Lymph Nodes Enlarged Anterior Bilaterally] : anterior cervical [Abdomen Tenderness] : non-tender [No CVA Tenderness] : no ~M costovertebral angle tenderness [Supraclavicular Lymph Nodes Enlarged Bilaterally] : supraclavicular [No Spinal Tenderness] : no spinal tenderness [Abnormal Walk] : normal gait [Nail Clubbing] : no clubbing  or cyanosis of the fingernails [Motor Tone] : muscle strength and tone were normal [FreeTextEntry1] : antalgic gait, MCB with swelling bilaterally, bony changes hands, knees b/l TKR, hips and ankles and shoulders FROM, Pain on Motion all joints [] : no rash [Deep Tendon Reflexes (DTR)] : deep tendon reflexes were 2+ and symmetric [Motor Exam] : the motor exam was normal [Oriented To Time, Place, And Person] : oriented to person, place, and time [Impaired Insight] : insight and judgment were intact [Affect] : the affect was normal

## 2020-06-03 NOTE — HISTORY OF PRESENT ILLNESS
[FreeTextEntry1] : Followup visit rheumatoid arthritis\par She was receiving orencia infusions in our office but stopped because they made her nauseous, she had severe GERD symptoms. She states that she had an endoscopy and was told she has an ulcer. She does not want to use it again. In the interval her joint pain is severe, she rates it as a 10 out of 10. She is unable to do much. She has deferred her plans to go to [place] at this time. She admits to morning stiffness which is 90 minutes.\par she is using prdnsioen 15 mg qd \par She wants to know what she can use. She has not seen pulmonary. She has not seen any other physicians. She admits to fatigue as well.\par \par She denies any fevers or chills or night sweats.

## 2020-06-04 LAB
HAV IGM SER QL: NONREACTIVE
HBV CORE IGM SER QL: NONREACTIVE
HBV SURFACE AG SER QL: NONREACTIVE
HCV AB SER QL: NONREACTIVE
HCV S/CO RATIO: 0.17 S/CO

## 2020-06-05 LAB
M TB IFN-G BLD-IMP: NEGATIVE
QUANTIFERON TB PLUS MITOGEN MINUS NIL: 4.1 IU/ML
QUANTIFERON TB PLUS NIL: 0.05 IU/ML
QUANTIFERON TB PLUS TB1 MINUS NIL: -0.03 IU/ML
QUANTIFERON TB PLUS TB2 MINUS NIL: -0.03 IU/ML

## 2020-06-05 RX ORDER — TOCILIZUMAB 20 MG/ML
80 INJECTION, SOLUTION, CONCENTRATE INTRAVENOUS
Qty: 1 | Refills: 0 | Status: COMPLETED | OUTPATIENT
Start: 2020-06-05 | End: 1900-01-01

## 2020-06-05 RX ORDER — TOCILIZUMAB 20 MG/ML
200 INJECTION, SOLUTION, CONCENTRATE INTRAVENOUS
Qty: 1 | Refills: 0 | Status: COMPLETED | OUTPATIENT
Start: 2020-06-05 | End: 1900-01-01

## 2020-06-05 RX ORDER — DIPHENHYDRAMINE HYDROCHLORIDE 50 MG/ML
50 INJECTION, SOLUTION INTRAMUSCULAR; INTRAVENOUS
Qty: 1 | Refills: 0 | Status: COMPLETED | OUTPATIENT
Start: 2020-06-05 | End: 1900-01-01

## 2020-06-11 ENCOUNTER — APPOINTMENT (OUTPATIENT)
Dept: RHEUMATOLOGY | Facility: CLINIC | Age: 75
End: 2020-06-11
Payer: MEDICARE

## 2020-06-11 VITALS
HEART RATE: 67 BPM | OXYGEN SATURATION: 93 % | RESPIRATION RATE: 16 BRPM | TEMPERATURE: 97.1 F | DIASTOLIC BLOOD PRESSURE: 87 MMHG | SYSTOLIC BLOOD PRESSURE: 153 MMHG

## 2020-06-11 VITALS
RESPIRATION RATE: 16 BRPM | SYSTOLIC BLOOD PRESSURE: 151 MMHG | DIASTOLIC BLOOD PRESSURE: 94 MMHG | OXYGEN SATURATION: 94 % | HEART RATE: 74 BPM

## 2020-06-11 PROCEDURE — 96413 CHEMO IV INFUSION 1 HR: CPT

## 2020-06-11 PROCEDURE — 96375 TX/PRO/DX INJ NEW DRUG ADDON: CPT

## 2020-06-11 RX ORDER — TOCILIZUMAB 20 MG/ML
200 INJECTION, SOLUTION, CONCENTRATE INTRAVENOUS
Qty: 1 | Refills: 0 | Status: COMPLETED | OUTPATIENT
Start: 2020-06-05

## 2020-06-11 RX ORDER — TOCILIZUMAB 20 MG/ML
80 INJECTION, SOLUTION, CONCENTRATE INTRAVENOUS
Qty: 1 | Refills: 0 | Status: COMPLETED | OUTPATIENT
Start: 2020-06-05

## 2020-06-11 RX ORDER — DIPHENHYDRAMINE HYDROCHLORIDE 50 MG/ML
50 INJECTION, SOLUTION INTRAMUSCULAR; INTRAVENOUS
Qty: 1 | Refills: 0 | Status: COMPLETED | OUTPATIENT
Start: 2020-06-05

## 2020-06-16 ENCOUNTER — APPOINTMENT (OUTPATIENT)
Dept: CT IMAGING | Facility: CLINIC | Age: 75
End: 2020-06-16
Payer: MEDICARE

## 2020-06-16 ENCOUNTER — OUTPATIENT (OUTPATIENT)
Dept: OUTPATIENT SERVICES | Facility: HOSPITAL | Age: 75
LOS: 1 days | End: 2020-06-16
Payer: MEDICARE

## 2020-06-16 DIAGNOSIS — Z98.890 OTHER SPECIFIED POSTPROCEDURAL STATES: Chronic | ICD-10-CM

## 2020-06-16 DIAGNOSIS — Z00.8 ENCOUNTER FOR OTHER GENERAL EXAMINATION: ICD-10-CM

## 2020-06-16 DIAGNOSIS — T84.093A OTHER MECHANICAL COMPLICATION OF INTERNAL LEFT KNEE PROSTHESIS, INITIAL ENCOUNTER: Chronic | ICD-10-CM

## 2020-06-16 DIAGNOSIS — Z96.651 PRESENCE OF RIGHT ARTIFICIAL KNEE JOINT: Chronic | ICD-10-CM

## 2020-06-16 PROCEDURE — 71250 CT THORAX DX C-: CPT | Mod: 26

## 2020-06-16 PROCEDURE — 71250 CT THORAX DX C-: CPT

## 2020-06-22 NOTE — CONSULT NOTE ADULT - PROVIDER SPECIALTY LIST ADULT
06/22/2020 appointment scheduled with Diamond Henry. Closing encounter.    Pau Gomez RN  Care Coordinator  [facility]      Internal Medicine

## 2020-07-11 ENCOUNTER — EMERGENCY (EMERGENCY)
Facility: HOSPITAL | Age: 75
LOS: 0 days | Discharge: HOME | End: 2020-07-11
Attending: HOSPITALIST
Payer: MEDICARE

## 2020-07-11 VITALS
HEART RATE: 110 BPM | DIASTOLIC BLOOD PRESSURE: 100 MMHG | RESPIRATION RATE: 18 BRPM | TEMPERATURE: 99 F | OXYGEN SATURATION: 95 % | SYSTOLIC BLOOD PRESSURE: 169 MMHG

## 2020-07-11 VITALS — HEIGHT: 68 IN | WEIGHT: 153 LBS

## 2020-07-11 DIAGNOSIS — Z79.01 LONG TERM (CURRENT) USE OF ANTICOAGULANTS: ICD-10-CM

## 2020-07-11 DIAGNOSIS — Z86.73 PERSONAL HISTORY OF TRANSIENT ISCHEMIC ATTACK (TIA), AND CEREBRAL INFARCTION WITHOUT RESIDUAL DEFICITS: ICD-10-CM

## 2020-07-11 DIAGNOSIS — Z98.890 OTHER SPECIFIED POSTPROCEDURAL STATES: Chronic | ICD-10-CM

## 2020-07-11 DIAGNOSIS — I10 ESSENTIAL (PRIMARY) HYPERTENSION: ICD-10-CM

## 2020-07-11 DIAGNOSIS — I48.91 UNSPECIFIED ATRIAL FIBRILLATION: ICD-10-CM

## 2020-07-11 DIAGNOSIS — T84.093A OTHER MECHANICAL COMPLICATION OF INTERNAL LEFT KNEE PROSTHESIS, INITIAL ENCOUNTER: Chronic | ICD-10-CM

## 2020-07-11 DIAGNOSIS — M06.9 RHEUMATOID ARTHRITIS, UNSPECIFIED: ICD-10-CM

## 2020-07-11 DIAGNOSIS — K76.0 FATTY (CHANGE OF) LIVER, NOT ELSEWHERE CLASSIFIED: ICD-10-CM

## 2020-07-11 DIAGNOSIS — Z96.651 PRESENCE OF RIGHT ARTIFICIAL KNEE JOINT: Chronic | ICD-10-CM

## 2020-07-11 DIAGNOSIS — Z79.82 LONG TERM (CURRENT) USE OF ASPIRIN: ICD-10-CM

## 2020-07-11 DIAGNOSIS — Z96.651 PRESENCE OF RIGHT ARTIFICIAL KNEE JOINT: ICD-10-CM

## 2020-07-11 DIAGNOSIS — M79.601 PAIN IN RIGHT ARM: ICD-10-CM

## 2020-07-11 LAB
ALBUMIN SERPL ELPH-MCNC: 3.8 G/DL — SIGNIFICANT CHANGE UP (ref 3.3–5)
ALP SERPL-CCNC: 95 U/L — SIGNIFICANT CHANGE UP (ref 40–120)
ALT FLD-CCNC: 33 U/L — SIGNIFICANT CHANGE UP (ref 12–78)
ANION GAP SERPL CALC-SCNC: 6 MMOL/L — SIGNIFICANT CHANGE UP (ref 5–17)
AST SERPL-CCNC: 22 U/L — SIGNIFICANT CHANGE UP (ref 15–37)
BASOPHILS # BLD AUTO: 0.04 K/UL — SIGNIFICANT CHANGE UP (ref 0–0.2)
BASOPHILS NFR BLD AUTO: 0.4 % — SIGNIFICANT CHANGE UP (ref 0–2)
BILIRUB SERPL-MCNC: 0.4 MG/DL — SIGNIFICANT CHANGE UP (ref 0.2–1.2)
BUN SERPL-MCNC: 13 MG/DL — SIGNIFICANT CHANGE UP (ref 7–23)
CALCIUM SERPL-MCNC: 9.1 MG/DL — SIGNIFICANT CHANGE UP (ref 8.5–10.1)
CHLORIDE SERPL-SCNC: 107 MMOL/L — SIGNIFICANT CHANGE UP (ref 96–108)
CO2 SERPL-SCNC: 28 MMOL/L — SIGNIFICANT CHANGE UP (ref 22–31)
CREAT SERPL-MCNC: 0.98 MG/DL — SIGNIFICANT CHANGE UP (ref 0.5–1.3)
EOSINOPHIL # BLD AUTO: 0 K/UL — SIGNIFICANT CHANGE UP (ref 0–0.5)
EOSINOPHIL NFR BLD AUTO: 0 % — SIGNIFICANT CHANGE UP (ref 0–6)
GLUCOSE SERPL-MCNC: 162 MG/DL — HIGH (ref 70–99)
HCT VFR BLD CALC: 40.9 % — SIGNIFICANT CHANGE UP (ref 34.5–45)
HGB BLD-MCNC: 13.3 G/DL — SIGNIFICANT CHANGE UP (ref 11.5–15.5)
IMM GRANULOCYTES NFR BLD AUTO: 0.5 % — SIGNIFICANT CHANGE UP (ref 0–1.5)
LYMPHOCYTES # BLD AUTO: 1.1 K/UL — SIGNIFICANT CHANGE UP (ref 1–3.3)
LYMPHOCYTES # BLD AUTO: 11 % — LOW (ref 13–44)
MCHC RBC-ENTMCNC: 28.4 PG — SIGNIFICANT CHANGE UP (ref 27–34)
MCHC RBC-ENTMCNC: 32.5 GM/DL — SIGNIFICANT CHANGE UP (ref 32–36)
MCV RBC AUTO: 87.2 FL — SIGNIFICANT CHANGE UP (ref 80–100)
MONOCYTES # BLD AUTO: 0.91 K/UL — HIGH (ref 0–0.9)
MONOCYTES NFR BLD AUTO: 9.1 % — SIGNIFICANT CHANGE UP (ref 2–14)
NEUTROPHILS # BLD AUTO: 7.9 K/UL — HIGH (ref 1.8–7.4)
NEUTROPHILS NFR BLD AUTO: 79 % — HIGH (ref 43–77)
PLATELET # BLD AUTO: 204 K/UL — SIGNIFICANT CHANGE UP (ref 150–400)
POTASSIUM SERPL-MCNC: 3.7 MMOL/L — SIGNIFICANT CHANGE UP (ref 3.5–5.3)
POTASSIUM SERPL-SCNC: 3.7 MMOL/L — SIGNIFICANT CHANGE UP (ref 3.5–5.3)
PROT SERPL-MCNC: 7.7 GM/DL — SIGNIFICANT CHANGE UP (ref 6–8.3)
RBC # BLD: 4.69 M/UL — SIGNIFICANT CHANGE UP (ref 3.8–5.2)
RBC # FLD: 15.4 % — HIGH (ref 10.3–14.5)
SODIUM SERPL-SCNC: 141 MMOL/L — SIGNIFICANT CHANGE UP (ref 135–145)
WBC # BLD: 10 K/UL — SIGNIFICANT CHANGE UP (ref 3.8–10.5)
WBC # FLD AUTO: 10 K/UL — SIGNIFICANT CHANGE UP (ref 3.8–10.5)

## 2020-07-11 PROCEDURE — 36415 COLL VENOUS BLD VENIPUNCTURE: CPT

## 2020-07-11 PROCEDURE — 99284 EMERGENCY DEPT VISIT MOD MDM: CPT | Mod: 25

## 2020-07-11 PROCEDURE — 99285 EMERGENCY DEPT VISIT HI MDM: CPT

## 2020-07-11 PROCEDURE — 93010 ELECTROCARDIOGRAM REPORT: CPT

## 2020-07-11 PROCEDURE — 80053 COMPREHEN METABOLIC PANEL: CPT

## 2020-07-11 PROCEDURE — 85025 COMPLETE CBC W/AUTO DIFF WBC: CPT

## 2020-07-11 PROCEDURE — 93005 ELECTROCARDIOGRAM TRACING: CPT

## 2020-07-11 PROCEDURE — 96375 TX/PRO/DX INJ NEW DRUG ADDON: CPT

## 2020-07-11 PROCEDURE — 96374 THER/PROPH/DIAG INJ IV PUSH: CPT

## 2020-07-11 RX ORDER — OMEPRAZOLE 10 MG/1
1 CAPSULE, DELAYED RELEASE ORAL
Qty: 14 | Refills: 0
Start: 2020-07-11 | End: 2020-07-24

## 2020-07-11 RX ORDER — KETOROLAC TROMETHAMINE 30 MG/ML
30 SYRINGE (ML) INJECTION ONCE
Refills: 0 | Status: DISCONTINUED | OUTPATIENT
Start: 2020-07-11 | End: 2020-07-11

## 2020-07-11 RX ADMIN — Medication 30 MILLIGRAM(S): at 18:07

## 2020-07-11 RX ADMIN — Medication 40 MILLIGRAM(S): at 18:08

## 2020-07-11 NOTE — ED STATDOCS - NSFOLLOWUPINSTRUCTIONS_ED_ALL_ED_FT
TAKE PREDNISONE AS PRESCRIBED. YOUR RHEUMATOLOGIST WILL TAPER DOSE AS NEEDED. OMEPRAZOLE PRESCRIBED TO LIMIT ACID PRODUCTION IN STOMACH WHILE USING INCREASED DOSE OF STEROIDS. RETURN TO ED IF SYMPTOMS WORSEN.     Rheumatoid Arthritis  Rheumatoid arthritis (RA) is a long-term (chronic) disease. RA causes inflammation in your joints. Your joints may feel painful, stiff, swollen, and warm. RA may start slowly. It most often affects the small joints of the hands and feet. It can also affect other parts of the body. Symptoms of RA often come and go.  There is no cure for RA, but medicines can help your symptoms.  What are the causes?  RA is an autoimmune disease. This means that your body's defense system (immune system) attacks healthy parts of your body by mistake. The exact cause of RA is not known.What increases the risk?  Being a woman.Having a family history of RA or other diseases like RA.Smoking.Being overweight.Being exposed to pollutants or chemicals.What are the signs or symptoms?  Morning stiffness that lasts longer than 30 minutes. This is often the first symptom.Symptoms start slowly. They are often worse in the morning.As RA gets worse, symptoms may include:  Pain, stiffness, swelling, warmth, and tenderness in joints on both sides of your body.Loss of energy.Not feeling hungry.Weight loss.A low fever.Dry eyes and a dry mouth.Firm lumps that grow under your skin.Changes in the way your joints look.Changes in the way your joints work.Symptoms vary and they:  Often come and go.Sometimes get worse for a period of time. These are called flares.How is this treated?     Treatment may include:  Taking good care of yourself. Be sure to rest as needed, eat a healthy diet, and exercise.Medicines. These may include:  Pain relievers.Medicines to help with inflammation.Disease-modifying antirheumatic drugs (DMARDs).Medicines called biologic response modifiers.Physical therapy and occupational therapy.Surgery, if joint damage is very bad.Your doctor will work with you to find the best treatments.  Follow these instructions at home:  Activity     Return to your normal activities as told by your doctor. Ask your doctor what activities are safe for you.Rest when you have a flare.Exercise as told by your doctor.General instructions     Take over-the-counter and prescription medicines only as told by your doctor.Keep all follow-up visits as told by your doctor. This is important.Where to find more information     American College of Rheumatology: www.rheumatology.orgArthritis Foundation: www.arthritis.orgContact a doctor if:  You have a flare.You have a fever.You have problems because of your medicines.Get help right away if:  You have chest pain.You have trouble breathing.You get a hot, painful joint all of a sudden, and it is worse than your normal joint aches.Summary  RA is a long-term disease.Symptoms of RA start slowly. They are often worse in the morning.RA causes inflammation in your joints.This information is not intended to replace advice given to you by your health care provider. Make sure you discuss any questions you have with your health care provider.

## 2020-07-11 NOTE — ED ADULT NURSE NOTE - OBJECTIVE STATEMENT
Pt to ED for RA flare up c/o severe +b/l UE pain beginning today. Has been taking prednisone without relief. Pain has been impairing her ADLs today. Endorses +HA as well. No fever, cough, SOB, or chest pain. Has appt with her rheumatologist for 7/15.

## 2020-07-11 NOTE — ED STATDOCS - PROGRESS NOTE DETAILS
75 y/o F with PMH of CVA, a-fib on eliquis, HTN, RA on 5mg prednisone daily presents with bilateral UE pain which started today. Notes no change in pain with additional prednisone at home. Pt states she took additional 10mg prednisone today. States she has rheumatology follow up in 4 days for infusion, unsure of what medication. Denies fever, chills, nausea, vomiting, trauma. Pt unsure if she's had to be admitted tfor RA flares in the past. Unsure what medications she's received in the past for RA flares. Rheum: Dr. Guadalupe. PCP: Dr. Sexton. PE: Well appearing. Cardiac: s1s2, RRR. Lungs: CTAB. Abdomen: NBS x4, soft, nontender. MSK: No obvious deformity, edema, erythema to extremities. Diffuse TTP in all extremities. ROM limited 2/2 pain in all extremities. A/p: Concern for RA flare. Plan for basic labs, solu-medrol, toradol and reassess. - Basilio Rao PA-C patient reports improvement in symptoms. Will dc home with prednisone x 1 week. Has rheumatology follow up in 4 days. Advised rheumatologist will need to continue with tapering dose. - Basilio Rao PA-C

## 2020-07-11 NOTE — ED STATDOCS - PATIENT PORTAL LINK FT
You can access the FollowMyHealth Patient Portal offered by [facility] by registering at the following website: [email or web address]. By joining [employer]’s FollowMyHealth portal, you will also be able to view your health information using other applications (apps) compatible with our system.

## 2020-07-11 NOTE — ED ADULT NURSE NOTE - NSIMPLEMENTINTERV_GEN_ALL_ED
Implemented All Universal Safety Interventions:  [place] to call system. Call bell, personal items and telephone within reach. Instruct patient to call for assistance. Room bathroom lighting operational. Non-slip footwear when patient is off stretcher. Physically safe environment: no spills, clutter or unnecessary equipment. Stretcher in lowest position, wheels locked, appropriate side rails in place.

## 2020-07-11 NOTE — ED STATDOCS - NS_ ATTENDINGSCRIBEDETAILS _ED_A_ED_FT
Cielo Mahan MD: The history, relevant review of systems, past medical and surgical history, medical decision making, and physical examination was documented by the scribe in my presence and I attest to the accuracy of the documentation.

## 2020-07-11 NOTE — ED STATDOCS - CLINICAL SUMMARY MEDICAL DECISION MAKING FREE TEXT BOX
75 y/o female with RA flare. Pain control, steroids, basic labs, and outpatient f/u with rheumatologist Dr. Guadalupe.

## 2020-07-11 NOTE — ED STATDOCS - OBJECTIVE STATEMENT
73 y/o female with PMHx of CVA, AFib on Eliquis, HTN, and RA presents ambulatory to the ED c/o severe +b/l UE pain beginning today. Has been taking prednisone without relief. Pain has been impairing her ADLs today. Endorses +HA as well. No fever, cough, SOB, or chest pain. Has appt with her rheumatologist for 7/15. Never a smoker. Allergic to methotrexate sodium. PCP: Dr. Manuel Sexton.

## 2020-07-15 ENCOUNTER — APPOINTMENT (OUTPATIENT)
Dept: RHEUMATOLOGY | Facility: CLINIC | Age: 75
End: 2020-07-15
Payer: MEDICARE

## 2020-07-15 VITALS
RESPIRATION RATE: 16 BRPM | SYSTOLIC BLOOD PRESSURE: 161 MMHG | OXYGEN SATURATION: 98 % | TEMPERATURE: 97.6 F | DIASTOLIC BLOOD PRESSURE: 92 MMHG | HEART RATE: 70 BPM

## 2020-07-15 VITALS
RESPIRATION RATE: 16 BRPM | OXYGEN SATURATION: 97 % | DIASTOLIC BLOOD PRESSURE: 91 MMHG | SYSTOLIC BLOOD PRESSURE: 162 MMHG | HEART RATE: 77 BPM

## 2020-07-15 PROCEDURE — 96375 TX/PRO/DX INJ NEW DRUG ADDON: CPT

## 2020-07-15 PROCEDURE — 96413 CHEMO IV INFUSION 1 HR: CPT

## 2020-07-15 RX ORDER — TOCILIZUMAB 20 MG/ML
80 INJECTION, SOLUTION, CONCENTRATE INTRAVENOUS
Qty: 1 | Refills: 0 | Status: COMPLETED | OUTPATIENT
Start: 2020-07-02

## 2020-07-15 RX ORDER — DIPHENHYDRAMINE HYDROCHLORIDE 50 MG/ML
50 INJECTION, SOLUTION INTRAMUSCULAR; INTRAVENOUS
Qty: 1 | Refills: 0 | Status: COMPLETED | OUTPATIENT
Start: 2020-07-02

## 2020-07-15 RX ORDER — TOCILIZUMAB 20 MG/ML
200 INJECTION, SOLUTION, CONCENTRATE INTRAVENOUS
Qty: 1 | Refills: 0 | Status: COMPLETED | OUTPATIENT
Start: 2020-07-02

## 2020-07-15 RX ORDER — METHYLPREDNISOLONE 125 MG/2ML
125 INJECTION, POWDER, LYOPHILIZED, FOR SOLUTION INTRAMUSCULAR; INTRAVENOUS
Qty: 0 | Refills: 0 | Status: COMPLETED | OUTPATIENT
Start: 2020-07-15

## 2020-07-15 RX ADMIN — METHYLPREDNISOLONE SODIUM SUCCINATE 0.48 MG: 125 INJECTION, POWDER, FOR SOLUTION INTRAMUSCULAR; INTRAVENOUS at 00:00

## 2020-07-20 NOTE — ED PROVIDER NOTE - EKG #1 DATE/TIME
I cannot give her a note to go back to work without a visit, please have her add to the schedule for tomorrow  
Pt calling stated her vertigo is getting a lot worse and stated that she is have nausea, Pt is asking If you can please send in a px to help with nausea. Please advise. Pt also wants a note stating that she is ok to return to work. To be faxed over to.  F: [phone]    Please advise  
Pt has been notified. Appt has been made   
29-Nov-2017 16:43

## 2020-07-27 NOTE — H&P ADULT - ASSESSMENT
6
71 yo female with PMH of RA on chronic prednisone and HTN presents with left sided weakness. Pt states she woke up around 7AM yesterday and noted to have left upper and lower extremity weakness. She though weakness was due to a pinched nerve in her shoulder. She continued to have this weakness and went to see her PMD today who recommended her to come to ED for further evaluation. She states her weakness is slightly improved but does have trouble ambulating. No facial droop, headaches, changes in vision, dizziness, chest pain, palpitations, SOB, abd pain, N/V, diarrhea.     NIHSS in ED =2. Ct head negative for acute infarct.    #Left sided weakness - r/o CVA  - admit to tele  - CT head negative for acute infarct  - MRI/MRA head and neck  - echo  - check lipids, HbA1c  - start asa  - start statin  - neuro checks  - neuro consult  - PT consult    #HTN  - continue amlodipine    #RA  - continue prednisone and plaquenil    #DVT prophylaxis  - lovenox

## 2020-08-07 ENCOUNTER — APPOINTMENT (OUTPATIENT)
Dept: RHEUMATOLOGY | Facility: CLINIC | Age: 75
End: 2020-08-07
Payer: MEDICARE

## 2020-08-07 VITALS
BODY MASS INDEX: 24.4 KG/M2 | TEMPERATURE: 97.9 F | HEIGHT: 68 IN | SYSTOLIC BLOOD PRESSURE: 130 MMHG | OXYGEN SATURATION: 93 % | DIASTOLIC BLOOD PRESSURE: 80 MMHG | WEIGHT: 161 LBS | HEART RATE: 73 BPM

## 2020-08-07 DIAGNOSIS — Z79.52 LONG TERM (CURRENT) USE OF SYSTEMIC STEROIDS: ICD-10-CM

## 2020-08-07 DIAGNOSIS — S32.9XXA FRACTURE OF UNSPECIFIED PARTS OF LUMBOSACRAL SPINE AND PELVIS, INITIAL ENCOUNTER FOR CLOSED FRACTURE: ICD-10-CM

## 2020-08-07 DIAGNOSIS — D89.9 DISORDER INVOLVING THE IMMUNE MECHANISM, UNSPECIFIED: ICD-10-CM

## 2020-08-07 DIAGNOSIS — J44.9 CHRONIC OBSTRUCTIVE PULMONARY DISEASE, UNSPECIFIED: ICD-10-CM

## 2020-08-07 PROCEDURE — 99214 OFFICE O/P EST MOD 30 MIN: CPT

## 2020-08-07 RX ORDER — PREDNISONE 5 MG/1
5 TABLET ORAL
Qty: 180 | Refills: 1 | Status: ACTIVE | COMMUNITY
Start: 2020-03-16 | End: 1900-01-01

## 2020-08-07 NOTE — REVIEW OF SYSTEMS
[Feeling Tired] : feeling tired [Arthralgias] : arthralgias [Joint Pain] : joint pain [Difficulty Walking] : difficulty walking [Chills] : no chills [Fever] : no fever [Feeling Poorly] : not feeling poorly [Eye Pain] : no eye pain [Dry Eyes] : no dryness of the eyes [Loss Of Hearing] : no hearing loss [Chest Pain] : no chest pain [Lower Ext Edema] : no lower extremity edema [Shortness Of Breath] : no shortness of breath [Cough] : no cough [SOB on Exertion] : no shortness of breath during exertion [Abdominal Pain] : no abdominal pain [Dysuria] : no dysuria [Joint Swelling] : no joint swelling [Joint Stiffness] : no joint stiffness [Skin Lesions] : no skin lesions [Dizziness] : no dizziness [Fainting] : no fainting [Limb Weakness] : no limb weakness [Anxiety] : no anxiety [Depression] : no depression [Muscle Weakness] : no muscle weakness [Easy Bruising] : no tendency for easy bruising

## 2020-08-07 NOTE — HISTORY OF PRESENT ILLNESS
[FreeTextEntry1] : Followup visit rheumatoid arthritis\par She comes in with her great grandchildren today. She has sold her house and is planning to move to [place] in the next 4 weeks. As far as her rheumatoid arthritis is concerned she is using sulfasalazine 2 g daily, prednisone 10 mg daily and so far has had 2 infusions of actemra. She has tolerated them well without any issues. But she feels that they have not helped yet. I discussed with her that it'll take at least 1-2 more infusions to work.\par \par Now she is moving to [place], stressed with her that she has to find a rheumatologist in [place]. She continues to use prednisone 10 mg daily. She has been very busy in and around the house and taking care of the great grandchildren. She saw pulmonary, had a CT of the chest and was told that it is improved but stable. She was told she does not need any new medications.\par \par She has not seen any other physicians. She has a good appetite and denies weight loss. She denies any infections.\par \par She rates her joint pain is well controlled at this time but she is on prednisone. She is concerned that she's gained 20 pounds since she's been on the prednisone. She rates her pain at a 3/10, she admits to morning stiffness for at least 30 minutes.

## 2020-08-07 NOTE — PHYSICAL EXAM
[General Appearance - Alert] : alert [General Appearance - Well Nourished] : well nourished [General Appearance - Well Developed] : well developed [Oropharynx] : the oropharynx was normal [Sclera] : the sclera and conjunctiva were normal [Neck Appearance] : the appearance of the neck was normal [Respiration, Rhythm And Depth] : normal respiratory rhythm and effort [Full Pulse] : the pedal pulses are present [Heart Sounds] : normal S1 and S2 [Abdomen Tenderness] : non-tender [Edema] : there was no peripheral edema [Supraclavicular Lymph Nodes Enlarged Bilaterally] : supraclavicular [Cervical Lymph Nodes Enlarged Anterior Bilaterally] : anterior cervical [No CVA Tenderness] : no ~M costovertebral angle tenderness [No Spinal Tenderness] : no spinal tenderness [Nail Clubbing] : no clubbing  or cyanosis of the fingernails [Abnormal Walk] : normal gait [] : no rash [Motor Tone] : muscle strength and tone were normal [Deep Tendon Reflexes (DTR)] : deep tendon reflexes were 2+ and symmetric [Motor Exam] : the motor exam was normal [Impaired Insight] : insight and judgment were intact [Affect] : the affect was normal [Oriented To Time, Place, And Person] : oriented to person, place, and time [FreeTextEntry1] : antalgic gait, MCP with bony changes, bony changes hands, knees b/l TKR, hips and ankles and shoulders FROM, FROM all joints, no tender points noted

## 2020-08-07 NOTE — ASSESSMENT
[FreeTextEntry1] : 74-year-old  female with multiple medical problems including hypertension, osteoarthritis status post bilateral knee replacement, left hemiparesis, on anticoagulation, pelvic fracture, and rheumatoid arthritis.\par \par Rheumatoid arthritis-\par She was dx prior to seeing me about 5 years ago based on joint pains and abnormal blood work.  She was steroid dependent and was taken off the steroids once she had the pelvic fracture. \par  Prior to seeing me she has tried Plaquenil. With me she initially tried cimzia followed by xeljanz along with MTX. Last year she was hospitalized with pneumonia, at that time it was unclear whether she had pneumonia versus COPD versus interstitial lung disease or side effect of methotrexate. She continues to stay off both the TNF inhibitors and the methotrexate.\par \par She is currently on sulfasalazine 2 g daily. She was on Orencia and doing well but stopped it due to GI side effects.\par -she is currently on prednisone 10 mg\par - She is on actmera infusions\par - will continue with infusions \par -stressed that she make an appt with rheumatology in [place] \par \par \par Nonspecific interstitial pneumonitis-\par She was admitted to [facility] in December 2018 with pneumonia, she also has superimposed NSIP most likely related to the rheumatoid arthritis. As per pulmonologist, it is unclear if she had to toxicity from methotrexate but we discontinued it.\par \par Her last CT chest as per pulmonary is stable somewhat improved, will obtain last note from Pulmonary. \par \par Immunosupresant use-\par she is aware to call if unwell\par \par Steroid use-\par Risks and benefits of steroids were d/w patient including but not limited to weight gain, diabetes, HTN, avascular necrosis, osteoporosis, glaucoma, cataract, infections and immunosuppression. She was supposed to repeat MD this year but was delayed due to covid. Last BMD was normal\par \par

## 2020-08-12 ENCOUNTER — APPOINTMENT (OUTPATIENT)
Dept: RHEUMATOLOGY | Facility: CLINIC | Age: 75
End: 2020-08-12
Payer: MEDICARE

## 2020-08-12 VITALS
RESPIRATION RATE: 18 BRPM | HEART RATE: 80 BPM | DIASTOLIC BLOOD PRESSURE: 90 MMHG | SYSTOLIC BLOOD PRESSURE: 154 MMHG | TEMPERATURE: 96.8 F | OXYGEN SATURATION: 94 %

## 2020-08-12 VITALS
HEART RATE: 76 BPM | OXYGEN SATURATION: 94 % | RESPIRATION RATE: 16 BRPM | DIASTOLIC BLOOD PRESSURE: 86 MMHG | SYSTOLIC BLOOD PRESSURE: 133 MMHG

## 2020-08-12 PROCEDURE — 96375 TX/PRO/DX INJ NEW DRUG ADDON: CPT

## 2020-08-12 PROCEDURE — 96413 CHEMO IV INFUSION 1 HR: CPT

## 2020-08-12 RX ORDER — TOCILIZUMAB 20 MG/ML
80 INJECTION, SOLUTION, CONCENTRATE INTRAVENOUS
Qty: 1 | Refills: 0 | Status: COMPLETED | OUTPATIENT
Start: 2020-07-30

## 2020-08-12 RX ORDER — DIPHENHYDRAMINE HYDROCHLORIDE 50 MG/ML
50 INJECTION, SOLUTION INTRAMUSCULAR; INTRAVENOUS
Qty: 1 | Refills: 0 | Status: COMPLETED | OUTPATIENT
Start: 2020-07-30

## 2020-08-12 RX ORDER — TOCILIZUMAB 20 MG/ML
200 INJECTION, SOLUTION, CONCENTRATE INTRAVENOUS
Qty: 1 | Refills: 0 | Status: COMPLETED | OUTPATIENT
Start: 2020-07-30

## 2020-08-12 RX ORDER — SULFASALAZINE 500 MG/1
500 TABLET, DELAYED RELEASE ORAL
Qty: 360 | Refills: 1 | Status: ACTIVE | COMMUNITY
Start: 2019-01-18 | End: 1900-01-01

## 2020-08-12 RX ORDER — METHYLPREDNISOLONE 125 MG/2ML
125 INJECTION, POWDER, LYOPHILIZED, FOR SOLUTION INTRAMUSCULAR; INTRAVENOUS
Qty: 0 | Refills: 0 | Status: COMPLETED | OUTPATIENT
Start: 2020-08-12

## 2020-08-12 RX ADMIN — METHYLPREDNISOLONE SODIUM SUCCINATE 0.48 MG: 125 INJECTION, POWDER, FOR SOLUTION INTRAMUSCULAR; INTRAVENOUS at 00:00

## 2020-08-28 ENCOUNTER — APPOINTMENT (OUTPATIENT)
Dept: RHEUMATOLOGY | Facility: CLINIC | Age: 75
End: 2020-08-28
Payer: MEDICARE

## 2020-08-28 VITALS
OXYGEN SATURATION: 97 % | BODY MASS INDEX: 24.4 KG/M2 | HEIGHT: 68 IN | HEART RATE: 110 BPM | WEIGHT: 161 LBS | DIASTOLIC BLOOD PRESSURE: 80 MMHG | TEMPERATURE: 96.7 F | SYSTOLIC BLOOD PRESSURE: 120 MMHG

## 2020-08-28 DIAGNOSIS — M06.9 RHEUMATOID ARTHRITIS, UNSPECIFIED: ICD-10-CM

## 2020-08-28 DIAGNOSIS — M19.049 PRIMARY OSTEOARTHRITIS, UNSPECIFIED HAND: ICD-10-CM

## 2020-08-28 PROCEDURE — 99213 OFFICE O/P EST LOW 20 MIN: CPT | Mod: 25

## 2020-08-28 PROCEDURE — 20600 DRAIN/INJ JOINT/BURSA W/O US: CPT | Mod: RT

## 2020-08-28 RX ORDER — METHYLPRED ACET/NACL,ISO-OS/PF 40 MG/ML
40 VIAL (ML) INJECTION
Qty: 1 | Refills: 0 | Status: COMPLETED | OUTPATIENT
Start: 2020-08-28

## 2020-08-28 RX ADMIN — METHYLPREDNISOLONE ACETATE MG/ML: 40 INJECTION, SUSPENSION INTRA-ARTICULAR; INTRALESIONAL; INTRAMUSCULAR; SOFT TISSUE at 00:00

## 2020-08-28 NOTE — HISTORY OF PRESENT ILLNESS
[FreeTextEntry1] : Comes in urgently today complaining of hand pain, she is in the process of moving. She complains of pain in her thumb area. She rates the pain at attentive to 10. She took prednisone 15 mg q. day with minimal relief. She has been using prednisone 10 mg daily. She denies numbness or tingling or any trauma to the area.

## 2020-08-28 NOTE — PROCEDURE
[Today's Date:] : Date: [unfilled] [Patient] : the patient [Risks] : risks [Benefits] : benefits [Alternatives] : alternatives [Consent Obtained] : written consent was obtained prior to the procedure and is detailed in the patient's record [#1 Site: ______] : #1 site identified in the [unfilled] [Therapeutic] : therapeutic [Alcohol] : alcohol [Betadine] : betadine solution [Ethyl Chloride] : ethyl chloride [Chlorhexidine] : chlorhexidine [25 gauge 1.5 inch] : A 25 gauge 1.5 inch needle was used [Depomedrol ___ mg] : Depomedrol [unfilled] mg

## 2020-08-28 NOTE — ASSESSMENT
[FreeTextEntry1] : 74-year-old female with rheumatoid arthritis, she called today stating that she had a flareup. She has used prednisone 15 mg q. day. She typically uses 10 mg daily.\par \par On my exam she has full range of motion of her hand, full range of motion of the small joints of the hand, she has full range of motion of her wrist. 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

## 2020-09-09 ENCOUNTER — APPOINTMENT (OUTPATIENT)
Dept: RHEUMATOLOGY | Facility: CLINIC | Age: 75
End: 2020-09-09
Payer: MEDICARE

## 2020-09-09 VITALS
DIASTOLIC BLOOD PRESSURE: 70 MMHG | OXYGEN SATURATION: 95 % | RESPIRATION RATE: 16 BRPM | TEMPERATURE: 97 F | SYSTOLIC BLOOD PRESSURE: 138 MMHG | HEART RATE: 90 BPM

## 2020-09-09 VITALS
HEART RATE: 92 BPM | OXYGEN SATURATION: 95 % | DIASTOLIC BLOOD PRESSURE: 87 MMHG | RESPIRATION RATE: 16 BRPM | SYSTOLIC BLOOD PRESSURE: 145 MMHG

## 2020-09-09 PROCEDURE — 96375 TX/PRO/DX INJ NEW DRUG ADDON: CPT

## 2020-09-09 PROCEDURE — 96413 CHEMO IV INFUSION 1 HR: CPT

## 2020-09-09 RX ORDER — DIPHENHYDRAMINE HYDROCHLORIDE 50 MG/ML
50 INJECTION, SOLUTION INTRAMUSCULAR; INTRAVENOUS
Qty: 1 | Refills: 0 | Status: COMPLETED | OUTPATIENT
Start: 2020-08-27

## 2020-09-09 RX ORDER — TOCILIZUMAB 20 MG/ML
80 INJECTION, SOLUTION, CONCENTRATE INTRAVENOUS
Qty: 1 | Refills: 0 | Status: COMPLETED | OUTPATIENT
Start: 2020-08-27

## 2020-09-09 RX ORDER — TOCILIZUMAB 20 MG/ML
200 INJECTION, SOLUTION, CONCENTRATE INTRAVENOUS
Qty: 1 | Refills: 0 | Status: COMPLETED | OUTPATIENT
Start: 2020-08-27

## 2020-10-07 ENCOUNTER — APPOINTMENT (OUTPATIENT)
Dept: RHEUMATOLOGY | Facility: CLINIC | Age: 75
End: 2020-10-07
Payer: MEDICARE

## 2020-10-07 VITALS
OXYGEN SATURATION: 98 % | SYSTOLIC BLOOD PRESSURE: 166 MMHG | TEMPERATURE: 97.9 F | DIASTOLIC BLOOD PRESSURE: 93 MMHG | HEART RATE: 61 BPM | RESPIRATION RATE: 16 BRPM

## 2020-10-07 VITALS
DIASTOLIC BLOOD PRESSURE: 84 MMHG | RESPIRATION RATE: 16 BRPM | SYSTOLIC BLOOD PRESSURE: 158 MMHG | HEART RATE: 62 BPM | OXYGEN SATURATION: 100 %

## 2020-10-07 PROCEDURE — 96413 CHEMO IV INFUSION 1 HR: CPT

## 2020-10-07 PROCEDURE — 96375 TX/PRO/DX INJ NEW DRUG ADDON: CPT

## 2020-10-07 RX ORDER — TOCILIZUMAB 20 MG/ML
80 INJECTION, SOLUTION, CONCENTRATE INTRAVENOUS
Qty: 1 | Refills: 0 | Status: COMPLETED | OUTPATIENT
Start: 2020-09-24

## 2020-10-07 RX ORDER — TOCILIZUMAB 20 MG/ML
200 INJECTION, SOLUTION, CONCENTRATE INTRAVENOUS
Qty: 1 | Refills: 0 | Status: COMPLETED | OUTPATIENT
Start: 2020-09-24

## 2020-10-07 RX ORDER — PANTOPRAZOLE 40 MG/1
40 TABLET, DELAYED RELEASE ORAL
Qty: 90 | Refills: 1 | Status: ACTIVE | COMMUNITY
Start: 2020-03-16 | End: 1900-01-01

## 2020-10-07 RX ORDER — DIPHENHYDRAMINE HYDROCHLORIDE 50 MG/ML
50 INJECTION, SOLUTION INTRAMUSCULAR; INTRAVENOUS
Qty: 1 | Refills: 0 | Status: COMPLETED | OUTPATIENT
Start: 2020-09-24

## 2020-11-09 ENCOUNTER — RX RENEWAL (OUTPATIENT)
Age: 75
End: 2020-11-09

## 2020-11-09 RX ORDER — PREDNISONE 5 MG/1
5 TABLET ORAL 3 TIMES DAILY
Qty: 270 | Refills: 0 | Status: ACTIVE | COMMUNITY
Start: 2020-05-28 | End: 1900-01-01

## 2021-03-08 NOTE — DISCHARGE NOTE ADULT - NSCORESITESY/N_GEN_A_CORE_RD
ANTICOAGULATION MANAGEMENT     Patient Name:  Ирина Yanez  Date:  3/8/2021    ASSESSMENT /SUBJECTIVE:    Today's INR result of 1.3 is subtherapeutic. Goal INR of 2.0-3.0      Warfarin dose taken: Warfarin taken as instructed    Diet: No new diet changes affecting INR    Medication changes/ interactions: No new medications/supplements affecting INR    Previous INR: Subtherapeutic     S/S of bleeding or thromboembolism: No    New injury or illness: No    Upcoming surgery, procedure or cardioversion: No  Additional findings: Pt continues to take eliquis as INR continues to be subtherapeutic.    PLAN:    Telephone call with home care nurse Araceli regarding INR result and instructed:     Warfarin Dosing Instructions: Take 10mg today then change your warfarin dose to 5mg daily  . (27.3 % change)     Using the new start protocol, pt is day 7. Over the last 4 days she has averaged 4.375mg/warfrain daily. Protocol calls for 10-20% dose increase. Going to 5mg/daily would be an increase of 14%.    Instructed patient to follow up no later than:   Orders given to  Homecare nurse/facility to recheck    Education provided: Target INR goal and significance of current INR result      Araceli verbalizes understanding and agrees to warfarin dosing plan.    Instructed to call the Anticoagulation Clinic for any changes, questions or concerns. (#[phone])        Addy Eng RN      OBJECTIVE:  Recent labs: (last 7 days)     21   INR 1.2* 1.3*         No question data found.  Anticoagulation Summary  As of 3/8/2021    INR goal:  2.0-3.0   TTR:  0.0 % (1 d)   INR used for dosin.3 (3/8/2021)   Warfarin maintenance plan:  5 mg (2.5 mg x 2) every day   Full warfarin instructions:  3/8: 7.5 mg; Otherwise 5 mg every day   Weekly warfarin total:  35 mg   Plan last modified:  Addy Eng RN (3/8/2021)   Next INR check:     Priority:  High   Target end date:  2022    Indications    Paroxysmal atrial  fibrillation (H) [I48.0]             Anticoagulation Episode Summary     INR check location:      Preferred lab:      Send INR reminders to:  KRIS ROGERS    Comments:        Anticoagulation Care Providers     Provider Role Specialty Phone number    Yuridia Villarreal MD Referring Family Medicine [phone]            Yes

## 2021-03-23 NOTE — ED PROVIDER NOTE - NSCAREINITIATED _GEN_ER
Left message for pt to callback.  Dr. Blue saw pt yesterday with orders to stop aspirin and start protonix 40mg daily and sent to GI stat and F/U with Dr. Blue x1 week (not scheduled yet).     Also noted another telephone encounter dated today from GI:  \"Left message for patient at  [phone] (home) to schedule Consult.  Patient to return call to  GI PRE ADMIT IAR SCHEDULING TEAM [phone]\".   Tri Layne(Attending)

## 2021-05-05 NOTE — PATIENT PROFILE ADULT - NSPROGENOTHERPROVIDER_GEN_A_NUR
NOTIFICATION RETURN TO WORK / SCHOOL 
 
5/5/2021 3:16 PM 
 
Ms. Jeff Mckeon [address] [place] [address],[phone] To Whom It May Concern: 
 
Jeff Mckeon is currently under the care of [address] [facility]. She will return to work/school on: 05/06/2021 If there are questions or concerns please have the patient contact our office. Sincerely, Toño Lin NP 
 
                                
 
 none

## 2021-06-12 NOTE — ED PROVIDER NOTE - NORMAL, MLM
Cardiology Consult Note      Consulting physician Miguelangel Dial MD, PhD  New patient encounter    REQUESTING PHYSICIAN    Kaden Funk,*    PATIENT IDENTIFICATION  Name: Rafael Ivey  Age: 84 y.o.  Sex: male  :  1936  MRN: [id number]             REASON FOR CONSULTATION: Agree with assessment discussed with nurse practitioner after my face-to-face encounter with the patient  84-year-old gentleman with no known history of coronary occlusive disease.  Patient does have known history of persistent atrial fibrillation, chronic diastolic heart failure, dyslipidemia, hypertension, mitral valve insufficiency, sick sinus syndrome with permanent pacemaker in situ, anticoagulation with warfarin and history of frequent falls.  Patient was evaluated for left atrial appendage device implant, however based on the measurements both on left atrial appendage venography and PRESTON 2020, left atrial appendage neck was felt to be too large for current maximal size device with watchman.  The procedure was aborted.    PRESTON showed normal LV systolic function, moderate mitral valve insufficiency.        CC:  Ventricular tachycardia    HISTORY OF PRESENT ILLNESS: As above  Patient presented to [facility] 2021 for symptoms of altered mental status.  Patient is very difficult historian, confused, very hard of hearing and therefore the history of present illness is gleaned from admission note and attending nurse.  Patient presented to ER upon family request for decreased responsiveness over the past several days.  He arrived confused and was unable to contribute information.  Upon discussion with family it was learned that wife has inadvertently been giving the patient 3 times his normal dose of baclofen.  His prescribed dose is 15 mg, however he was receiving 45 mg over the past week.  It was also determined that she had accidentally switched the dosing of his Sinemet and baclofen-receiving  Sinemet once daily and baclofen 3 times daily.  While in the emergency department patient was observed to have an episode of ventricular tachycardia that lasted approximately 1 minute.  He became responsive, received 1 round of CPR and returned back to a paced rhythm before any further intervention could be initiated.  He was initially admitted to the intensive care unit.  He was transferred out this morning to medical floor on telemetry.  Patient's attending nurse reports no further ectopy.  He is remained in a paced rhythm.  Upon my evaluation, patient is sitting up in bed asleep upon entry with normal respiratory effort.  He is pleasantly confused and alert.  He has failed his swallow eval.    Unable to obtain review of systems secondary to mental status  Historical data copied forward from previous encounters and EMR is unchanged        REVIEW OF SYSTEMS:  Review of systems could not be obtained due to   patient confusion.    OBJECTIVE   Troponin 0.036, 0.034  CK 73  Hemoglobin 9.3      ASSESSMENT  Accidental baclofen overdose  Sustained ventricular tachycardia  Abnormal cardiac enzymes  Dysphagia  Chronic and persistent atrial fibrillation  Chronic diastolic heart failure  Sick sinus syndrome  Single-chamber pacemaker in situ  Anticoagulation secondary to warfarin  History of frequent falls  Valvular heart disease     Mitral valve insufficiency    PLAN  Patient's daughter has been involved in discussions with nursing to determine best avenue for managing patient's home medications.  Reportedly wife has some vision loss.  Patient has had no further ectopy.  Patient had minor, nontrending troponin elevation-likely secondary to VT and CPR.  Patient now n.p.o. except for sips and chips.  Will discuss medication changes with cardiologist      Attending plan  Stop baclofen  Avoid QT prolonging medicines  Gentle IV fluids  2D echo for structural and functional evaluation  Continue telemetry  Magnesium greater than 2  "potassium greater than 4 recommended  Avoid sedating medicines  We will avoid ischemic evaluation present secondary to more likely cause of arrhythmia with supratherapeutic medication polypharmacy unless EF abnormal  This would prompt family discussion and goals of care discussion      Further recommendation follow findings and clinical course        Vital Signs  Visit Vitals  /75 (BP Location: Left arm, Patient Position: Lying)   Pulse 68   Temp 97.7 °F (36.5 °C) (Oral)   Resp 20   Ht 182.9 cm (72\")   Wt 92.5 kg (203 lb 14.8 oz)   SpO2 96%   BMI 27.66 kg/m²     Oxygen Therapy  SpO2: 96 %  Pulse Oximetry Type: Intermittent  Device (Oxygen Therapy): nasal cannula  Flow (L/min): 2  Flowsheet Rows        First Filed Value   Admission Height  182.9 cm (72\") Documented at 06/11/2021 1233   Admission Weight  91.2 kg (201 lb) Documented at 06/11/2021 1233          Intake & Output (last 3 days)         06/09 0701 - 06/10 0700 06/10 0701 - 06/11 0700 06/11 0701 - 06/12 0700 06/12 0701 - 06/13 0700    I.V. (mL/kg)   729 (7.9)     Total Intake(mL/kg)   729 (7.9)     Net   +729             Stool Unmeasured Occurrence    1 x          Lines, Drains & Airways      Active LDAs       Name:   Placement date:   Placement time:   Site:   Days:    Peripheral IV 06/11/21 1310 Right Antecubital   06/11/21    1310    Antecubital   less than 1                    MEDICAL HISTORY    Past Medical History:   Diagnosis Date    Anemia     Arthritis     left hip     Atrial fibrillation (CMS/HCC)     Atrial fibrillation (CMS/HCC)     chronic    Cardiomegaly     mild    Chronic diastolic heart failure (CMS/HCC)     GERD (gastroesophageal reflux disease)     Hyperlipidemia     Hypertension     Hypotension     Lower extremity edema     Mitral regurgitation     Obesity     Pacemaker     Parkinson disease (CMS/HCC)     Recurrent falls     with injury     Sick sinus syndrome (CMS/HCC)     s/p pacemaker implant     Valvular heart disease     MR    "     SURGICAL HISTORY    Past Surgical History:   Procedure Laterality Date    APPENDECTOMY      ATRIAL APPENDAGE EXCLUSION LEFT WITH TRANSESOPHAGEAL ECHOCARDIOGRAM N/A 5/28/2020    Procedure: Atrial Appendage Occlusion;  Surgeon: Jim Walker MD;  Location: [facility] CATH INVASIVE LOCATION;  Service: Cardiovascular;  Laterality: N/A;    ATRIAL APPENDAGE EXCLUSION LEFT WITH TRANSESOPHAGEAL ECHOCARDIOGRAM N/A 5/28/2020    Procedure: Atrial Appendage Occlusion;  Surgeon: Lashaun So MD;  Location: [facility] CATH INVASIVE LOCATION;  Service: Cardiovascular;  Laterality: N/A;    CATARACT EXTRACTION      CHOLECYSTECTOMY      COLONOSCOPY N/A 5/21/2020    Procedure: COLONOSCOPY WITH BIOPSY X1 AREA;  Surgeon: Sim Collins MD;  Location: [facility] ENDOSCOPY;  Service: Gastroenterology;  Laterality: N/A;  Post: poor prep, impacted stool in rectum, and internal hemorrhoids, ascending colon arteriovenous malformation    ENDOSCOPY N/A 5/21/2020    Procedure: ESOPHAGOGASTRODUODENOSCOPY with clipping x 1 of bleeding gastric polyp;  Surgeon: Sim Collins MD;  Location: [facility] ENDOSCOPY;  Service: Gastroenterology;  Laterality: N/A;  Post: bleeding gastric polyp    HIP SURGERY Right 08/2017    OTHER SURGICAL HISTORY      skin mole excisions     PACEMAKER IMPLANTATION  06/22/2017    Levin's ([employer])    TONSILLECTOMY      TOTAL HIP ARTHROPLASTY Left 05/20/2014        FAMILY HISTORY    Family History   Problem Relation Age of Onset    Heart failure Mother     Stroke Maternal Grandfather        SOCIAL HISTORY    Social History     Tobacco Use    Smoking status: Former Smoker     Packs/day: 1.00     Years: 40.00     Pack years: 40.00     Types: Cigarettes    Smokeless tobacco: Never Used    Tobacco comment: quit 1970's    Substance Use Topics    Alcohol use: Yes        ALLERGIES    Allergies   Allergen Reactions    Amoxicillin Diarrhea    Cephalexin Diarrhea and Nausea And Vomiting    Penicillins Other (See Comments)  "   Amoxicillin-Pot Clavulanate Nausea And Vomiting    Clarithromycin Nausea And Vomiting              /75 (BP Location: Left arm, Patient Position: Lying)   Pulse 68   Temp 97.7 °F (36.5 °C) (Oral)   Resp 20   Ht 182.9 cm (72\")   Wt 92.5 kg (203 lb 14.8 oz)   SpO2 96%   BMI 27.66 kg/m²   Intake/Output last 3 shifts:  I/O last 3 completed shifts:  In: 729 [I.V.:729]  Out: -   Intake/Output this shift:  No intake/output data recorded.    PHYSICAL EXAM:    General: Well-developed, well-nourished and frail 84-year-old male who is alert, cooperative, no distress, appears stated age  Head:  Normocephalic, atraumatic, mucous membranes moist  Eyes:  Conjunctiva/corneas clear, EOM's intact     Neck:  Supple,  no adenopathy; no JVD or bruit  Lungs: Clear to auscultation bilaterally, no wheezes rhonchi rales are noted  Chest wall: No tenderness  Heart::  Regular rate and rhythm, S1 and S2 normal, 2/6 holosystolic murmur  Abdomen: Soft, non-tender, nondistended bowel sounds active  Extremities: No cyanosis, clubbing, or edema   Pulses: 2+ and symmetric all extremities  Skin:  No rashes or lesions  Neuro/psych: Awake and alert, pleasantly confused, cooperative, [place]  Agree with exam as discussed with nurse practitioner after my face-to-face encounter    Scheduled Meds:      carbidopa-levodopa, 1 tablet, Oral, TID  escitalopram, 10 mg, Oral, Daily  ferrous sulfate, 324 mg, Oral, Daily With Breakfast  furosemide, 40 mg, Oral, Daily  multivitamin with minerals, 1 tablet, Oral, Daily  [START ON 6/13/2021] oxybutynin, 5 mg, Oral, Nightly  pantoprazole, 40 mg, Oral, QAM  potassium chloride, 20 mEq, Oral, TID  rosuvastatin, 10 mg, Oral, Nightly  sodium chloride, 10 mL, Intravenous, Q12H  warfarin, 2 mg, Oral, Once per day on Mon Tue Wed Thu Fri Sat        Continuous Infusions:    Pharmacy to dose warfarin,   sodium chloride, 75 mL/hr, Last Rate: 75 mL/hr (06/11/21 5902)        PRN Meds:      acetaminophen **OR** " acetaminophen    aluminum-magnesium hydroxide-simethicone    Diclofenac Sodium    ipratropium-albuterol    magnesium sulfate **OR** magnesium sulfate in D5W 1g/100mL (PREMIX)    nitroglycerin    ondansetron **OR** ondansetron    Pharmacy to dose warfarin    potassium chloride **OR** potassium chloride **OR** potassium chloride    potassium phosphate infusion greater than 15 mMoles **OR** potassium phosphate infusion greater than 15 mMoles **OR** potassium phosphate **OR** sodium phosphate IVPB **OR** sodium phosphate IVPB **OR** sodium phosphate IVPB    sodium chloride        Results Review:     I reviewed the patient's new clinical results.    CBC    Results from last 7 days   Lab Units 06/12/21  0126 06/11/21  1309   WBC 10*3/mm3 5.10 4.90   HEMOGLOBIN g/dL 9.3* 9.6*   PLATELETS 10*3/mm3 250 279     Cr Clearance Estimated Creatinine Clearance: 71.4 mL/min (by C-G formula based on SCr of 0.99 mg/dL).  Coag   Results from last 7 days   Lab Units 06/12/21  0642 06/12/21  0126 06/11/21  1309   INR  2.34 2.49 2.22     HbA1C No results found for: HGBA1C  Blood Glucose   Glucose   Date/Time Value Ref Range Status   06/12/2021 0515 91 70 - 105 mg/dL Final     Comment:     Serial Number: [id number]:  [id number]     Infection     CMP   Results from last 7 days   Lab Units 06/11/21  1607 06/11/21  1309   SODIUM mmol/L 145 142   POTASSIUM mmol/L 3.7 3.7   CHLORIDE mmol/L 106 104   CO2 mmol/L 26.0 27.0   BUN mg/dL 22 23   CREATININE mg/dL 0.99 0.99   GLUCOSE mg/dL 100* 105*   ALBUMIN g/dL 3.70  --    BILIRUBIN mg/dL 1.0  --    ALK PHOS U/L 136*  --    AST (SGOT) U/L 26  --    ALT (SGPT) U/L 21  --      ABG    Results from last 7 days   Lab Units 06/11/21  1611   PH, ARTERIAL pH units 7.435   PCO2, ARTERIAL mm Hg 40.4   PO2 ART mm Hg 91.7   O2 SATURATION ART % 97.4   BASE EXCESS ART mmol/L 2.6     UA    Results from last 7 days   Lab Units 06/11/21  1315   NITRITE UA  Negative     HUMPHREY  No results found for: POCMETH,  POCAMPHET, POCBARBITUR, POCBENZO, POCCOCAINE, POCOPIATES, POCOXYCODO, POCPHENCYC, POCPROPOXY, POCTHC, POCTRICYC  Lysis Labs   Results from last 7 days   Lab Units 06/12/21  0642 06/12/21  0126 06/11/21  1607 06/11/21  1309   INR  2.34 2.49  --  2.22   HEMOGLOBIN g/dL  --  9.3*  --  9.6*   PLATELETS 10*3/mm3  --  250  --  279   CREATININE mg/dL  --   --  0.99 0.99     Radiology(recent) CT Head Without Contrast    Result Date: 6/11/2021  1. No acute intracranial abnormality. 2. Generalized cerebral atrophy.  Electronically Signed By-Jason Dan MD On:6/11/2021 3:28 PM This report was finalized on [id number] by  Jason Dan MD.        Results from last 7 days   Lab Units 06/12/21  0642 06/11/21  1607   CK TOTAL U/L  --  73   TROPONIN T ng/mL 0.034*  --        Xrays, labs reviewed personally by physician.    ECG/EMG Results (most recent)       Procedure Component Value Units Date/Time    ECG 12 Lead [[id number]] Collected: 06/11/21 1303     Updated: 06/11/21 1304     QT Interval 473 ms     Narrative:      HEART RATE= 70  bpm  RR Interval= 852  ms  VT Interval= 230  ms  P Horizontal Axis= 188  deg  P Front Axis= 0  deg  QRSD Interval= 177  ms  QT Interval= 473  ms  QRS Axis= -85  deg  T Wave Axis= 82  deg  - ABNORMAL ECG -  Sinus rhythm  Prolonged VT interval  IVCD, consider RBBB  ST elevation secondary to IVCD  When compared with ECG of 11-Aug-2020 1:08:56,  Significant change in rhythm: previously atrial fibrillation  New conduction abnormality  Significant repolarization change  Significant axis, voltage or hypertrophy change  Electronically Signed By:   Date and Time of Study: 2021-06-11 13:03:05    ECG 12 Lead [[id number]] Collected: 06/11/21 1500     Updated: 06/11/21 1516     QT Interval 469 ms     Narrative:      HEART RATE= 76  bpm  RR Interval= 788  ms  VT Interval= 230  ms  P Horizontal Axis= 187  deg  P Front Axis= 54  deg  QRSD Interval= 176  ms  QT Interval= 469  ms  QRS Axis= -77  deg  T Wave Axis= 78   deg  - ABNORMAL ECG -  Sinus rhythm  Atrial premature complex  Prolonged NV interval  IVCD, consider RBBB  ST elevation secondary to IVCD  When compared with ECG of 11-Jun-2021 13:03:05,  No significant change  Electronically Signed By:   Date and Time of Study: 2021-06-11 15:00:10              Medication Review:   I have reviewed the patient's current medication list  Scheduled Meds:carbidopa-levodopa, 1 tablet, Oral, TID  escitalopram, 10 mg, Oral, Daily  ferrous sulfate, 324 mg, Oral, Daily With Breakfast  furosemide, 40 mg, Oral, Daily  multivitamin with minerals, 1 tablet, Oral, Daily  [START ON 6/13/2021] oxybutynin, 5 mg, Oral, Nightly  pantoprazole, 40 mg, Oral, QAM  potassium chloride, 20 mEq, Oral, TID  rosuvastatin, 10 mg, Oral, Nightly  sodium chloride, 10 mL, Intravenous, Q12H  warfarin, 2 mg, Oral, Once per day on Mon Tue Wed Thu Fri Sat      Continuous Infusions:Pharmacy to dose warfarin,   sodium chloride, 75 mL/hr, Last Rate: 75 mL/hr (06/11/21 1839)      PRN Meds:.  acetaminophen **OR** acetaminophen    aluminum-magnesium hydroxide-simethicone    Diclofenac Sodium    ipratropium-albuterol    magnesium sulfate **OR** magnesium sulfate in D5W 1g/100mL (PREMIX)    nitroglycerin    ondansetron **OR** ondansetron    Pharmacy to dose warfarin    potassium chloride **OR** potassium chloride **OR** potassium chloride    potassium phosphate infusion greater than 15 mMoles **OR** potassium phosphate infusion greater than 15 mMoles **OR** potassium phosphate **OR** sodium phosphate IVPB **OR** sodium phosphate IVPB **OR** sodium phosphate IVPB    sodium chloride    Imaging:  Imaging Results (Last 72 Hours)       Procedure Component Value Units Date/Time    CT Head Without Contrast [[id number]] Collected: 06/11/21 1522     Updated: 06/11/21 1531    Narrative:         DATE OF EXAM:  6/11/2021 3:10 PM     PROCEDURE:   CT HEAD WO CONTRAST-     INDICATIONS:   Mental status change, unknown cause     COMPARISON:  CT  "head without contrast 11/23/2018     TECHNIQUE:   Routine transaxial cuts were obtained through the head without the  administration of contrast. Automated exposure control and iterative  reconstruction methods were used.      FINDINGS:  There is no acute intracranial hemorrhage. No mass effect or midline  shift. Prominence of the ventricles and cortical sulci similar to prior  study most consistent with generalized cerebral atrophy. Posterior fossa  is without acute abnormality. Globes are symmetric. There is thinning of  the lens bilaterally. Posterior fossa without acute abnormality. The  mastoid air cells are well-aerated. There is leftward deviation of the  nasal septum. Retention cysts noted in the left maxillary sinus  measuring 11 mm. Calvarium intact. Asymmetric right temporomandibular  osteoarthritis.       Impression:      1. No acute intracranial abnormality.  2. Generalized cerebral atrophy.     Electronically Signed By-Jason Dan MD On:6/11/2021 3:28 PM  This report was finalized on [id number] by  Jason Dan MD.              STEPHANY Parmar  06/12/21  12:16 EDT       EMR Dragon/Transcription:   \"Dictated utilizing Dragon dictation\".       Plan documented above    New patient new new problems above    Miguelangel Dial MD, PhD    Please call with any question concerns          Electronically signed by STEPHANY Parmar, 06/12/21, 12:16 PM EDT.    " vinay all pertinent systems normal

## 2021-09-20 NOTE — PATIENT PROFILE ADULT - DO YOU FEEL UNSAFE AT SCHOOL?
graft placement/devitalized or nonviable tissue at the level of:/prepare site for appropriate surgical interventions to optimize wound healing
no

## 2021-12-06 NOTE — PATIENT PROFILE ADULT - PACKS PER DAY
Chronic and ongoing DM-2, on insulin. .  Currently on      - empagliflozine (Jardiance) 25 mg, daily     - pioglitazone (Actos) 30 mg daily.     - liraglutide (Victoza) 1.8 inj, QHS     - glargine 21 units QHS  Currently fairly stable, without major changes.   Notes prior issue with metformin (nausea).  Denies:  polyphagia, polydipsia, polyuria.    Last A1c - 7.0.  - continue on same meds (as above).    0

## 2022-01-13 NOTE — ED PROVIDER NOTE - CHPI ED SYMPTOMS POS
Hayde Monique is an extremely pleasant 49 year old year old female patient here today for spots on eyelids.   .   Patient states this has been present for a while.  Patient reports the following symptoms:  brown.  Patient reports the following previous treatments none.  These treatments did not work.  Patient reports the following modifying factors none.  Associated symptoms: none.  Patient has no other skin complaints today.  Remainder of the HPI, Meds, PMH, Allergies, FH, and SH was reviewed in chart.      Past Medical History:   Diagnosis Date     Anxiety attack      Depressive disorder 2000     Dermatofibroma 2015     Family history of breast cancer      Family history of breast cancer      LSIL (low grade squamous intraepithelial lesion) on Pap smear 10/2010    2012 pap/hpv neg     OCD (obsessive compulsive disorder)      Right lateral epicondylitis 7/10/2020       Past Surgical History:   Procedure Laterality Date     ABDOMEN SURGERY           ARTHROSCOPY SHOULDER Right 2017    Procedure:  Right shoulder arthroscopy and biceps tenolysis.  Arthroscopic subacromial decompresson (acromioplasty, bursectomy and coracoacromial ligament resection). Arthroscopic distal clavicle resection.  Surgeon:  Luca Rodríguez MD  Location: [facility]     BREAST SURGERY      implants       SECTION       COLONOSCOPY N/A 2021    Procedure: COLONOSCOPY;  Surgeon: Kirt Benitez MD;  Location:  GI     COSMETIC SURGERY      Implants     ENT SURGERY      Tubes     ESOPHAGOSCOPY, GASTROSCOPY, DUODENOSCOPY (EGD), COMBINED N/A 2021    Procedure: ESOPHAGOGASTRODUODENOSCOPY, WITH BIOPSies;  Surgeon: Kirt Benitez MD;  Location:  GI     orif leg Right     right lower leg, rodding     ORTHOPEDIC SURGERY Right     Broken Leg - chin and screws     WRIST SURGERY Left     Left wrist, cyst excision        Family History   Problem Relation Age of Onset      Cancer Mother 55        Lung cancer     Other Cancer Mother         Lung     Depression Mother      Anxiety Disorder Mother      Rheumatoid Arthritis Sister      Breast Cancer Maternal Grandmother      Rheumatoid Arthritis Paternal Grandmother      Depression Sister      Anxiety Disorder Sister      Colon Cancer No family hx of        Social History     Socioeconomic History     Marital status:      Spouse name: Not on file     Number of children: Not on file     Years of education: Not on file     Highest education level: Not on file   Occupational History     Not on file   Tobacco Use     Smoking status: Former Smoker     Packs/day: 0.00     Years: 1.00     Pack years: 0.00     Types: Cigarettes     Smokeless tobacco: Never Used     Tobacco comment: sept 2021   Vaping Use     Vaping Use: Never used   Substance and Sexual Activity     Alcohol use: Not Currently     Alcohol/week: 0.0 standard drinks     Comment: three times per week about 2 glasses of wine     Drug use: No     Sexual activity: Yes     Partners: Male     Birth control/protection: Male Surgical, None   Other Topics Concern     Parent/sibling w/ CABG, MI or angioplasty before 65F 55M? No      Service No     Blood Transfusions No     Caffeine Concern Yes     Comment: 2 pops a day      Occupational Exposure Not Asked     Hobby Hazards Not Asked     Sleep Concern Not Asked     Stress Concern Not Asked     Weight Concern Not Asked     Special Diet Not Asked     Back Care Not Asked     Exercise Not Asked     Bike Helmet Not Asked     Seat Belt Not Asked     Self-Exams Yes   Social History Narrative     Not on file     Social Determinants of Health     Financial Resource Strain: Not on file   Food Insecurity: Not on file   Transportation Needs: Not on file   Physical Activity: Not on file   Stress: Not on file   Social Connections: Not on file   Intimate Partner Violence: Not on file   Housing Stability: Not on file       Outpatient Encounter  Medications as of 1/13/2022   Medication Sig Dispense Refill     atorvastatin (LIPITOR) 40 MG tablet Take 1 tablet (40 mg) by mouth daily 90 tablet 3     busPIRone (BUSPAR) 10 MG tablet Take 1 tablet (10 mg) by mouth daily 90 tablet 1     fluticasone (FLONASE) 50 MCG/ACT nasal spray Spray 2 sprays into both nostrils daily as needed for rhinitis or allergies       minocycline (MINOCIN) 100 MG capsule TAKE ONE CAPSULE BY MOUTH TWICE A  capsule 1     omeprazole (PRILOSEC) 20 MG DR capsule TAKE ONE CAPSULE BY MOUTH EVERY DAY 60 capsule 3     PARoxetine (PAXIL) 40 MG tablet TAKE ONE TABLET BY MOUTH EVERY DAY 90 tablet 1     No facility-administered encounter medications on file as of 1/13/2022.             O:   NAD, WDWN, Alert & Oriented, Mood & Affect wnl, Vitals stable   Here today alone   /82   Pulse 80   SpO2 97%    General appearance normal   Vitals stable   Alert, oriented and in no acute distress     Tags on orbit  R lower lid cyst      Stuck on papules and brown macules on trunk and ext       Eyes: Conjunctivae/lids:Normal     ENT: Lips, buccal mucosa, tongue: normal    MSK:Normal    Cardiovascular: peripheral edema none    Pulm: Breathing Normal    Neuro/Psych: Orientation:Alert and Orientedx3 ; Mood/Affect:normal       A/P:  1. Seborrheic keratosis, lentigo,   2. Seborrheic keratosis on orbits  Cosmetic nature discussed with patient   She is fine with 150 dollar charge  L orbit x6, R orbit x1  TANGENTIAL EXCISION:  After consent, anesthesia with LEC and prep, tangential excision performed.  No complications and routine wound care.    3. Cyst  Schedule excision prn   It was a pleasure speaking to Hayde Monique today.  Previous clinic notes and pertinent laboratory tests were reviewed prior to Hayde Monique's visit.  BENIGN LESIONS DISCUSSED WITH PATIENT:  I discussed the specifics of tumor, prognosis, and genetics of benign lesions.  I explained that treatment of these lesions would be purely  cosmetic and not medically neccessary.  I discussed with patient different removal options including excision, cautery and /or laser.      Nature and genetics of benign skin lesions dicussed with patient.  Signs and Symptoms of skin cancer discussed with patient.  Patient encouraged to perform monthly skin exams.  UV precautions reviewed with patient.  Return to clinic next appt     COUGH/+hand and wrist pain/CHEST PAIN

## 2022-05-02 NOTE — ED ADULT NURSE NOTE - NS ED NURSE IV DC DT
05/02/22 1130   Oxygen Therapy   O2 Sat (%) 97 %   Pulse via Oximetry 85 beats per minute   O2 Device None (Room air)   Pre-Treatment   Breath Sounds Bilateral Diminished   Pre FEV1 (liters) 2.2 liters   % Predicted 72   Pt's symptoms include:    Snoring  Tiredness- excessive daytime sleepiness  HTN  Neck size      48.5        cm  Modified Solano stage 4  SACS Score 66  STOP BANG 7  Height    5  '  9  \"   Weight  292   lbs  BMI 42    Sleepiness Scale:     Sitting and reading 3    Watching TV 3    Sitting inactive in a public place 1    As a passenger in a car for an hour without a break 3    Lying down to rest in the afternoon when circumstances Permits 3    Sitting and talking to someone 0    Sitting quietly after lunch without alcohol 3    In a car, while stopped for a few minutes 0    Total :  16      Refer patient for sleep study based on above assessment. T  Phone number:  [phone]  Initial respiratory Assessment completed with pt. Pt was interviewed and evaluated in [facility] prior to surgery. Patient ID:  Genny Sahu  [id number]  1 y.o.  1970  Surgeon: Dr. Barbra Chester  Date of Surgery: 5/19/2022  Procedure:  Total Left Knee Arthroplasty  Primary Care Physician: Jordon Cristobal [phone]  Specialists:     Pt taught proper COUGH technique  DIAPHRAGMATIC BREATHING EXERCISE INSTRUCTIONS GIVEN    History of smoking:   DENIES                 Quit date:         Secondhand smoke:FATHER    Past procedures with Oxygen desaturation or delayed awakening:DENIES    Past Medical History:   Diagnosis Date    Arthritis     OA    Diabetes ([facility] [address]     insulin- fbs avg 100- A1C 6.0  (5/2021) hyposymptoms at 59    Hypertension     carvedilol, losart-hct    Obesity (BMI 30-39.9) 06/22/2021    BMI 37.07    Sleep apnea     wears CPAP      3/7/2017 RT SLIGHTLY ELEVATED HEMIDIAPHRAGM  Respiratory history:DENIES SOB                                                                  Respiratory meds: DENIES    FAMILY PRESENT:             NO     PAST SLEEP STUDY:        YES                  3/5/2020 SEVERE GOVIND  HX OF GOVIND:                        YES                    GOVIND assessment:                                               SLEEPS ON SIDE        PHYSICAL EXAM   Body mass index is 42 kg/m². Visit Vitals  BP (!) 158/118 (BP 1 Location: Left upper arm, BP Patient Position: Sitting)   Pulse 82   Temp 97 °F (36.1 °C)   Resp 18   Ht 5' 10\" (1.778 m)   Wt 132.8 kg (292 lb 11.2 oz)   SpO2 (P) 97%   BMI 42.00 kg/m²     Neck circumference:  48.5    cm    Loud snoring:                                                 YES             Witnessed apnea or wakening gasping or choking:        DENIES        Awakens with headaches:                                               DENIES  Morning or daytime tiredness/ sleepiness:                           TIRED  Dry mouth or sore throat in morning:                     DENIES                                   Solano stage:  4                                   SACS score:66  Stop Bang   STOP-BANG  Does the patient snore loudly (louder than talking or loud enough to be heard through closed doors)?: Yes  Does the patient often feel tired, fatigued, or sleepy during the daytime, even after a \"good\" night's sleep?: Yes  Has anyone ever observed the patient stop breathing during their sleep? : No  Does the patient have or are they being treated for high blood pressure?: Yes  Is the patient's BMI greater than 35?: Yes  Is your neck circumference greater than 17 inches (Male) or 16 inches (Female)?: Yes  Is the patient older than 48?: Yes  Is the patient male?: Yes  GOVIND Score: 7  Has the patient been referred to Sleep Medicine?: Yes  Has the patient previously been diagnosed with Obstructive Sleep Apnea?: Yes  Treated or Untreated?: Untreated                            PT NON-COMPLIANT with CPAP. Dangers of non-compliance with treatment of GOVIND explained to pt.   GOVIND handouts given to pt.    ALBUTEROL Q 6 PRN  CONT. SAT MONITOR HS after surgery. O2 @ 3 lpm NC HS   RESPIRATORY ASSESSMENT Q SHIFT. Referrals:  [facility]  Pt.  Phone Number: [phone] 23:[phone]

## 2022-06-16 NOTE — ED ADULT NURSE NOTE - NS ED NOTE  TALK SOMEONE YN
· No

## 2022-09-01 NOTE — ED PROVIDER NOTE - LIVES WITH, PROFILE
September 1, 2022     Patient: Андрей Canas  YOB: 2015  Date of Visit: 9/1/2022      To Whom it May Concern:    Андрей Canas is under my professional care  Madelaine Pleaez was seen in my office on 9/1/2022  Madelaine Benigno may return to school on 9/1/2022  If you have any questions or concerns, please don't hesitate to call           Sincerely,          Kumar White MD        CC: No Recipients other relative/family

## 2022-10-10 NOTE — H&P ADULT - NSHPROSALLOTHERNEGRD_GEN_ALL_CORE
Letter on Dr. Shira Benitez for signature
Patient is asking for a RX for a handicapped placard?     Please call when ready    Please advise
Patient is legally blind, he does not drive. He wants to have this so when he is in the care with someone he will have it?     Please advise
All other review of systems negative, except as noted in HPI

## 2022-12-14 NOTE — ED ADULT NURSE NOTE - RESPIRATORY ASSESSMENT
60 y/o male presents to ED with c/o 3 days fatigue and diarrhea. Endorses lightheadedness and nausea starting yesterday. States he fell with +head injury and was seen over the weekend and had a negative CT but became concerned with the nausea and lightheadedness. Also reports experiencing abdominal discomfort earlier today.   Reports he took pepto bismol and his stool was then green.
WDL

## 2023-01-20 NOTE — ED ADULT NURSE NOTE - THOUGHTS OF SUICIDE/SELF-HARM YN, MLM
[id number]   No

## 2023-04-26 NOTE — PROGRESS NOTE ADULT - ASSESSMENT
Pt arrives with complaints of 2.5 weeks of productive cough with green sputum, congestion, and sinus pressure. Denies fever or SOB.   Pt is a 72 y/o female with h/o HTN, rheumatoid arthritis on chronic prednisone who s/p failed Lt knee replacement in 2016.  She has been having increasing Lt knee pain therefore she was admitted for Lt failed total knee arthroplasty.  Post-op medicine consult called for comanagement.      * Lt knee pain-s/p Lt total knee arthroplasty, continue pain control, PT, DVT proph, encourage use of incentive spirometry.  * Acute Blood Loss Anemia-surgical loss, monitor, po iron.  * HTN-bp stable on amlodipine, monitor.  * Rheumatoid Arthritis-continue po prednisone which she is on chronically, s/p IV steroids in OR, no signs for adrenal insufficiency  * Leukocytosis-reactive from surgery, anemia, steroids, now resolved.  * Proph- aspirin  * Disp-OOB, PT, d/c planning, pt is medically stable for d/c.  * Comm- d/w pt, RN

## 2023-10-17 NOTE — PATIENT PROFILE ADULT - CONTRAINDICATIONS & PRECAUTIONS (SELECT ALL THAT APPLY)
Patient alert and oriented x4, VSS, walking around room without s/s of distress. Patient denies n/v, diarrhea, SOB. Patient c/o pain in right buttock and coccyx, intermittent, sharp and shooting. Patient states the tramadol helped, but gave him a headache. Patient denies needs at this time. Bed in lowest and locked position. Patient is UAL and tolerating well, non-slip socks on, call light in reach. Patient/surrogate refused vaccine...

## 2024-05-11 NOTE — PATIENT PROFILE ADULT - NSASFUNCLEVELADLTRANSFER_GEN_A_NUR
Initially presented with COPD exacerbation which is improving with s/p nebs, steroids (5 days) and azithromycin (3 days)  Resume home inhalers  Continue nebs as needed  Respiratory protocol   2 = assistive person

## 2024-07-06 NOTE — REVIEW OF SYSTEMS
20.4 [Fever] : no fever [Chills] : no chills [Feeling Poorly] : not feeling poorly [Feeling Tired] : feeling tired [Eye Pain] : no eye pain [Dry Eyes] : no dryness of the eyes [Loss Of Hearing] : no hearing loss [Chest Pain] : no chest pain [Shortness Of Breath] : no shortness of breath [Lower Ext Edema] : no lower extremity edema [Cough] : no cough [SOB on Exertion] : no shortness of breath during exertion [Abdominal Pain] : no abdominal pain [Dysuria] : no dysuria [Arthralgias] : arthralgias [Joint Pain] : joint pain [Joint Swelling] : no joint swelling [Joint Stiffness] : no joint stiffness [Skin Lesions] : no skin lesions [Dizziness] : no dizziness [Fainting] : no fainting [Limb Weakness] : no limb weakness [Difficulty Walking] : difficulty walking [Depression] : no depression [Anxiety] : no anxiety [Muscle Weakness] : no muscle weakness [Easy Bruising] : no tendency for easy bruising

## 2025-04-21 NOTE — PHYSICAL THERAPY INITIAL EVALUATION ADULT - GROSSLY INTACT, SENSORY
\"Have you been to the ER, urgent care clinic since your last visit?  Hospitalized since your last visit?\"    NO    “Have you seen or consulted any other health care providers outside our system since your last visit?”    NO      “Have you had a colorectal cancer screening such as a colonoscopy/FIT/Cologuard?    YES - Type: Colonoscopy - Where: 2023 Nurse/CMA to request most recent records if not in the chart     Date of last Colonoscopy: 12/4/2015  No cologuard on file  No FIT/FOBT on file   No flexible sigmoidoscopy on file           Grossly Intact

## 2025-05-05 NOTE — ED ADULT TRIAGE NOTE - MODE OF ARRIVAL
Department of Anesthesiology  Preprocedure Note       Name:  Anibal Quinteros   Age:  22 y.o.  :  2002                                          MRN:  [id number]         Date:  2025      Surgeon: Surgeon(s):  Kael Allison MD    Procedure: Procedure(s):  APPENDECTOMY LAPAROSCOPIC PATIENT ATE AT 1030    Medications prior to admission:   Prior to Admission medications    Medication Sig Start Date End Date Taking? Authorizing Provider   metoprolol succinate (TOPROL XL) 25 MG extended release tablet Take 1 tablet by mouth daily 3/6/25   Fahad Shipman DO   albuterol sulfate HFA (VENTOLIN HFA) 108 (90 Base) MCG/ACT inhaler Inhale 2 puffs into the lungs 4 times daily as needed for Wheezing 24   Iva Craft APRN - NP   ondansetron (ZOFRAN) 4 MG tablet Take 1 tablet by mouth every 8 hours as needed for Nausea or Vomiting    Eric Monzon MD   promethazine (PHENERGAN) 12.5 MG suppository Place 1 suppository rectally every 6 hours as needed for Nausea    Eric Monzon MD   OLANZapine (ZYPREXA) 10 MG tablet Take 1 tablet by mouth nightly    Eric Monzon MD   Cetirizine HCl (ZYRTEC ALLERGY) 10 MG CAPS 1 tab(s) orally once a day    Eric Monzon MD   budesonide-formoterol (SYMBICORT) 160-4.5 MCG/ACT AERO TAKE 2 PUFFS TWICE A DAY (RINSE & SPIT AFTER USE) 3/7/20   Eric Monzon MD   ARIPiprazole (ABILIFY) 20 MG tablet TAKE ONE TABLET BY MOUTH ONE TIME DAILY AT BEDTIME 22   Eric Monzon MD   lamoTRIgine (LAMICTAL) 200 MG tablet Take 1 tablet by mouth nightly 20   Eric Monzon MD   Melatonin 5 MG CAPS 1 cap(s) orally once a day (at bedtime)    Eric Monzon MD   methylphenidate (RITALIN LA) 40 MG extended release capsule 1 CAP(S) ORALLY ONCE A DAY (IN THE MORNING) for 30 DAY(S)    Eric Monzon MD   omeprazole (PRILOSEC) 40 MG delayed release capsule Take 1 capsule by mouth daily 23   Eric Monzon MD 
Walk in